# Patient Record
Sex: FEMALE | Race: WHITE | NOT HISPANIC OR LATINO
[De-identification: names, ages, dates, MRNs, and addresses within clinical notes are randomized per-mention and may not be internally consistent; named-entity substitution may affect disease eponyms.]

---

## 2017-01-20 ENCOUNTER — RX RENEWAL (OUTPATIENT)
Age: 67
End: 2017-01-20

## 2017-02-28 ENCOUNTER — CLINICAL ADVICE (OUTPATIENT)
Age: 67
End: 2017-02-28

## 2017-09-14 ENCOUNTER — RX RENEWAL (OUTPATIENT)
Age: 67
End: 2017-09-14

## 2018-06-27 ENCOUNTER — APPOINTMENT (OUTPATIENT)
Dept: INTERNAL MEDICINE | Facility: CLINIC | Age: 68
End: 2018-06-27
Payer: MEDICARE

## 2018-06-27 VITALS
SYSTOLIC BLOOD PRESSURE: 122 MMHG | OXYGEN SATURATION: 98 % | HEART RATE: 80 BPM | TEMPERATURE: 97.8 F | BODY MASS INDEX: 38.98 KG/M2 | DIASTOLIC BLOOD PRESSURE: 76 MMHG | HEIGHT: 63 IN | WEIGHT: 220 LBS

## 2018-06-27 PROCEDURE — 36415 COLL VENOUS BLD VENIPUNCTURE: CPT

## 2018-06-27 PROCEDURE — 93000 ELECTROCARDIOGRAM COMPLETE: CPT

## 2018-06-27 PROCEDURE — G0438: CPT | Mod: 25

## 2018-06-27 PROCEDURE — G0447 BEHAVIOR COUNSEL OBESITY 15M: CPT

## 2018-06-27 NOTE — HEALTH RISK ASSESSMENT
[Good] : ~his/her~  mood as  good [No falls in past year] : Patient reported no falls in the past year [Patient reported mammogram was normal] : Patient reported mammogram was normal [Patient reported PAP Smear was normal] : Patient reported PAP Smear was normal [Patient reported colonoscopy was normal] : Patient reported colonoscopy was normal [Hepatitis C test offered] : Hepatitis C test offered [Retired] : retired [Single] : single [Feels Safe at Home] : Feels safe at home [Fully functional (bathing, dressing, toileting, transferring, walking, feeding)] : Fully functional (bathing, dressing, toileting, transferring, walking, feeding) [Fully functional (using the telephone, shopping, preparing meals, housekeeping, doing laundry, using] : Fully functional and needs no help or supervision to perform IADLs (using the telephone, shopping, preparing meals, housekeeping, doing laundry, using transportation, managing medications and managing finances) [Smoke Detector] : smoke detector [Safety elements used in home] : safety elements used in home [Seat Belt] :  uses seat belt [Sunscreen] : uses sunscreen [Discussed at today's visit] : Advance Directives Discussed at today's visit [] : No [Change in mental status noted] : No change in mental status noted [Sexually Active] : not sexually active [Reports changes in hearing] : Reports no changes in hearing [Reports changes in vision] : Reports no changes in vision [Reports changes in dental health] : Reports no changes in dental health [TB Exposure] : is not being exposed to tuberculosis [MammogramDate] : 2014 [PapSmearDate] : 2013 [ColonoscopyDate] : 2008 [ColonoscopyComments] : refuses [de-identified] : mother [FreeTextEntry4] : [place] FOR[place] PROVIDED

## 2018-06-27 NOTE — HISTORY OF PRESENT ILLNESS
[FreeTextEntry1] : This is a 67-year-old female presents today to meet me as her primary care doctor and for initial examination

## 2018-06-27 NOTE — COUNSELING
[Weight management counseling provided] : Weight management [Healthy eating counseling provided] : healthy eating [Activity counseling provided] : activity [Good understanding] : Patient has a good understanding of lifestyle changes and the steps needed to achieve self management goals [ - Behavioral Counseling for Obesity (Face-to-Face for 15 Minutes)] : Behavioral Counseling for Obesity (Face-to-Face for 15 Minutes)

## 2018-06-27 NOTE — ASSESSMENT
[FreeTextEntry1] : Health maintenance.\par Her BMI is elevated in the class II obesity range and at least a 30 pound weight loss is recommended. We had an extended conversation regarding obesity-related morbidities including her current diagnoses of diabetes and hypertension.Patient understands that these conditions would be better controlled or possibly even resolve with recommended weight loss. Dietary and lifestyle approaches to achieve weight loss were reviewed. Referral to obesity management offered. Patient will consider.\par Daily age and ability appropriate aerobic exercises strongly recommended.\par No STD risk or substance abuse per patient report.\par Hepatitis C antibody sent with patient approval.\par GYN followup is recommended. Patient will consider.\par Mammogram ordered. Patient will schedule.\par Bone density testing ordered. Patient will schedule.\par She refuses colonoscopy at this time. May consider later.\par No depression. Competent with ADLs.\par Yearly flu vaccine is recommended.\par Pneumococcal and shingles vaccine series are recommended. Patient will consider.\par \par All medications refilled at patient request.

## 2018-06-28 LAB
25(OH)D3 SERPL-MCNC: 37.4 NG/ML
ALBUMIN SERPL ELPH-MCNC: 4.2 G/DL
ALP BLD-CCNC: 81 U/L
ALT SERPL-CCNC: 44 U/L
ANION GAP SERPL CALC-SCNC: 13 MMOL/L
AST SERPL-CCNC: 56 U/L
BASOPHILS # BLD AUTO: 0.04 K/UL
BASOPHILS NFR BLD AUTO: 0.4 %
BILIRUB SERPL-MCNC: 0.4 MG/DL
BUN SERPL-MCNC: 19 MG/DL
CALCIUM SERPL-MCNC: 10.9 MG/DL
CHLORIDE SERPL-SCNC: 99 MMOL/L
CHOLEST SERPL-MCNC: 117 MG/DL
CHOLEST/HDLC SERPL: 2.1 RATIO
CO2 SERPL-SCNC: 23 MMOL/L
CREAT SERPL-MCNC: 0.83 MG/DL
EOSINOPHIL # BLD AUTO: 0.39 K/UL
EOSINOPHIL NFR BLD AUTO: 3.6 %
GLUCOSE SERPL-MCNC: 222 MG/DL
HBA1C MFR BLD HPLC: 9.9 %
HCT VFR BLD CALC: 37.9 %
HDLC SERPL-MCNC: 55 MG/DL
HGB BLD-MCNC: 12.4 G/DL
IMM GRANULOCYTES NFR BLD AUTO: 0.7 %
LDLC SERPL CALC-MCNC: 34 MG/DL
LYMPHOCYTES # BLD AUTO: 2.31 K/UL
LYMPHOCYTES NFR BLD AUTO: 21.5 %
MAN DIFF?: NORMAL
MCHC RBC-ENTMCNC: 30 PG
MCHC RBC-ENTMCNC: 32.7 GM/DL
MCV RBC AUTO: 91.5 FL
MONOCYTES # BLD AUTO: 0.77 K/UL
MONOCYTES NFR BLD AUTO: 7.2 %
NEUTROPHILS # BLD AUTO: 7.14 K/UL
NEUTROPHILS NFR BLD AUTO: 66.6 %
PLATELET # BLD AUTO: 207 K/UL
POTASSIUM SERPL-SCNC: 4.5 MMOL/L
PROT SERPL-MCNC: 7.4 G/DL
RBC # BLD: 4.14 M/UL
RBC # FLD: 13.3 %
SODIUM SERPL-SCNC: 135 MMOL/L
TRIGL SERPL-MCNC: 139 MG/DL
TSH SERPL-ACNC: 3.36 UIU/ML
WBC # FLD AUTO: 10.73 K/UL

## 2018-07-05 ENCOUNTER — APPOINTMENT (OUTPATIENT)
Dept: MAMMOGRAPHY | Facility: CLINIC | Age: 68
End: 2018-07-05

## 2018-07-05 ENCOUNTER — APPOINTMENT (OUTPATIENT)
Dept: RADIOLOGY | Facility: CLINIC | Age: 68
End: 2018-07-05

## 2018-07-11 ENCOUNTER — NON-APPOINTMENT (OUTPATIENT)
Age: 68
End: 2018-07-11

## 2018-07-17 ENCOUNTER — FORM ENCOUNTER (OUTPATIENT)
Age: 68
End: 2018-07-17

## 2018-07-18 ENCOUNTER — APPOINTMENT (OUTPATIENT)
Dept: MAMMOGRAPHY | Facility: CLINIC | Age: 68
End: 2018-07-18
Payer: MEDICARE

## 2018-07-18 ENCOUNTER — OUTPATIENT (OUTPATIENT)
Dept: OUTPATIENT SERVICES | Facility: HOSPITAL | Age: 68
LOS: 1 days | End: 2018-07-18
Payer: COMMERCIAL

## 2018-07-18 ENCOUNTER — APPOINTMENT (OUTPATIENT)
Dept: RADIOLOGY | Facility: CLINIC | Age: 68
End: 2018-07-18
Payer: MEDICARE

## 2018-07-18 PROCEDURE — 77085 DXA BONE DENSITY AXL VRT FX: CPT | Mod: 26

## 2018-07-18 PROCEDURE — 77085 DXA BONE DENSITY AXL VRT FX: CPT

## 2018-07-18 PROCEDURE — 76642 ULTRASOUND BREAST LIMITED: CPT | Mod: 26,RT

## 2018-07-18 PROCEDURE — 76642 ULTRASOUND BREAST LIMITED: CPT

## 2018-07-18 PROCEDURE — 77067 SCR MAMMO BI INCL CAD: CPT | Mod: 26

## 2018-07-18 PROCEDURE — 77063 BREAST TOMOSYNTHESIS BI: CPT

## 2018-07-18 PROCEDURE — 77063 BREAST TOMOSYNTHESIS BI: CPT | Mod: 26

## 2018-07-18 PROCEDURE — 77067 SCR MAMMO BI INCL CAD: CPT

## 2018-07-24 ENCOUNTER — INBOUND DOCUMENT (OUTPATIENT)
Age: 68
End: 2018-07-24

## 2018-08-01 ENCOUNTER — APPOINTMENT (OUTPATIENT)
Dept: INTERNAL MEDICINE | Facility: CLINIC | Age: 68
End: 2018-08-01
Payer: MEDICARE

## 2018-08-01 PROCEDURE — 99214 OFFICE O/P EST MOD 30 MIN: CPT

## 2018-08-01 NOTE — ASSESSMENT
[FreeTextEntry1] : #1) HTN.\par Her blood pressure today is 122/76, down from 140/75 on her last visit.\par We reviewed her blood pressure regimen and the need for ongoing compliance.\par Will continue to follow on an occasional basis.\par \par #2) T2 DM.\par Review of recent A1c at 9.9.\par We had an extended conversation lasting at least 15 minutes regarding the association between uncontrolled dietary caloric intake, weight gain, and loss of control of blood sugars.\par I reminded the patient that over the past several years and has been necessary to increase her metformin from 500 mg up to 850 mg. I further discussed that she may require a second medication. I reviewed with her dietary and lifestyle approaches to achieve better glucose control and for weight loss.\par Will increase metformin dose to 1000 mg b.i.d. Patient promises to increase her dietary discretion. Will return in 3 months for followup blood work.\par \par #3)Parathyroid disease with elevated calcium.\par Her calcium level now is 10.9. This is improved from 11.3 about 18 months ago. Will continue to follow on an occasional basis. Her vitamin D level is good at about 38. Patient is encouraged to maintain compliance with supplemental vitamin D. Will followup her PTH level on next visit.\par Note that her bone density recently done is completely normal.\par \par #4) mammography.\par Patient reminded to followup her mammogram in 6 months as recommended by radiology. She is aware.

## 2018-08-01 NOTE — HISTORY OF PRESENT ILLNESS
[FreeTextEntry1] : Presents for blood pressure check and discussion regarding diabetes management. [de-identified] : No new complaints.\par States has been compliant with medications.

## 2018-10-05 ENCOUNTER — APPOINTMENT (OUTPATIENT)
Dept: INTERNAL MEDICINE | Facility: CLINIC | Age: 68
End: 2018-10-05
Payer: MEDICARE

## 2018-10-05 VITALS
HEIGHT: 63 IN | DIASTOLIC BLOOD PRESSURE: 84 MMHG | OXYGEN SATURATION: 96 % | SYSTOLIC BLOOD PRESSURE: 156 MMHG | WEIGHT: 218 LBS | BODY MASS INDEX: 38.62 KG/M2 | HEART RATE: 91 BPM | TEMPERATURE: 97.9 F

## 2018-10-05 PROCEDURE — 90472 IMMUNIZATION ADMIN EACH ADD: CPT

## 2018-10-05 PROCEDURE — G0008: CPT

## 2018-10-05 PROCEDURE — G0009: CPT

## 2018-10-05 PROCEDURE — 90662 IIV NO PRSV INCREASED AG IM: CPT

## 2018-10-05 PROCEDURE — 99214 OFFICE O/P EST MOD 30 MIN: CPT | Mod: 25

## 2018-10-05 PROCEDURE — 90670 PCV13 VACCINE IM: CPT

## 2018-10-15 ENCOUNTER — APPOINTMENT (OUTPATIENT)
Dept: INTERNAL MEDICINE | Facility: CLINIC | Age: 68
End: 2018-10-15
Payer: MEDICARE

## 2018-10-15 VITALS
DIASTOLIC BLOOD PRESSURE: 76 MMHG | OXYGEN SATURATION: 96 % | SYSTOLIC BLOOD PRESSURE: 160 MMHG | HEART RATE: 79 BPM | TEMPERATURE: 98 F | HEIGHT: 63 IN

## 2018-10-15 DIAGNOSIS — Z13.89 ENCOUNTER FOR SCREENING FOR OTHER DISORDER: ICD-10-CM

## 2018-10-15 PROCEDURE — 99214 OFFICE O/P EST MOD 30 MIN: CPT

## 2018-11-07 ENCOUNTER — APPOINTMENT (OUTPATIENT)
Dept: INTERNAL MEDICINE | Facility: CLINIC | Age: 68
End: 2018-11-07
Payer: MEDICARE

## 2018-11-07 VITALS
OXYGEN SATURATION: 98 % | HEART RATE: 80 BPM | TEMPERATURE: 98 F | HEIGHT: 63 IN | DIASTOLIC BLOOD PRESSURE: 72 MMHG | SYSTOLIC BLOOD PRESSURE: 149 MMHG

## 2018-11-07 PROCEDURE — 99213 OFFICE O/P EST LOW 20 MIN: CPT

## 2018-11-29 ENCOUNTER — APPOINTMENT (OUTPATIENT)
Dept: PHYSICAL MEDICINE AND REHAB | Facility: CLINIC | Age: 68
End: 2018-11-29
Payer: MEDICARE

## 2018-11-29 VITALS
HEART RATE: 73 BPM | RESPIRATION RATE: 16 BRPM | OXYGEN SATURATION: 97 % | WEIGHT: 218 LBS | HEIGHT: 63 IN | BODY MASS INDEX: 38.62 KG/M2

## 2018-11-29 DIAGNOSIS — Z86.59 PERSONAL HISTORY OF OTHER MENTAL AND BEHAVIORAL DISORDERS: ICD-10-CM

## 2018-11-29 DIAGNOSIS — Z87.09 PERSONAL HISTORY OF OTHER DISEASES OF THE RESPIRATORY SYSTEM: ICD-10-CM

## 2018-11-29 DIAGNOSIS — Z87.39 PERSONAL HISTORY OF OTHER DISEASES OF THE MUSCULOSKELETAL SYSTEM AND CONNECTIVE TISSUE: ICD-10-CM

## 2018-11-29 PROCEDURE — 99204 OFFICE O/P NEW MOD 45 MIN: CPT

## 2018-12-02 ENCOUNTER — FORM ENCOUNTER (OUTPATIENT)
Age: 68
End: 2018-12-02

## 2018-12-03 ENCOUNTER — APPOINTMENT (OUTPATIENT)
Dept: RADIOLOGY | Facility: CLINIC | Age: 68
End: 2018-12-03
Payer: MEDICARE

## 2018-12-03 ENCOUNTER — OUTPATIENT (OUTPATIENT)
Dept: OUTPATIENT SERVICES | Facility: HOSPITAL | Age: 68
LOS: 1 days | End: 2018-12-03

## 2018-12-03 PROCEDURE — 73502 X-RAY EXAM HIP UNI 2-3 VIEWS: CPT | Mod: 26,LT

## 2018-12-05 ENCOUNTER — INBOUND DOCUMENT (OUTPATIENT)
Age: 68
End: 2018-12-05

## 2018-12-13 ENCOUNTER — APPOINTMENT (OUTPATIENT)
Dept: PHYSICAL MEDICINE AND REHAB | Facility: CLINIC | Age: 68
End: 2018-12-13
Payer: MEDICARE

## 2018-12-13 VITALS
HEIGHT: 63 IN | BODY MASS INDEX: 38.62 KG/M2 | RESPIRATION RATE: 16 BRPM | HEART RATE: 74 BPM | OXYGEN SATURATION: 97 % | WEIGHT: 218 LBS

## 2018-12-13 PROCEDURE — 99214 OFFICE O/P EST MOD 30 MIN: CPT

## 2019-01-17 ENCOUNTER — APPOINTMENT (OUTPATIENT)
Dept: INTERNAL MEDICINE | Facility: CLINIC | Age: 69
End: 2019-01-17
Payer: MEDICARE

## 2019-01-17 VITALS
SYSTOLIC BLOOD PRESSURE: 140 MMHG | TEMPERATURE: 97.6 F | HEIGHT: 63 IN | OXYGEN SATURATION: 97 % | DIASTOLIC BLOOD PRESSURE: 83 MMHG | WEIGHT: 218 LBS | BODY MASS INDEX: 38.62 KG/M2 | HEART RATE: 79 BPM

## 2019-01-17 DIAGNOSIS — G57.02 LESION OF SCIATIC NERVE, LEFT LOWER LIMB: ICD-10-CM

## 2019-01-17 PROCEDURE — 36415 COLL VENOUS BLD VENIPUNCTURE: CPT

## 2019-01-17 PROCEDURE — 99214 OFFICE O/P EST MOD 30 MIN: CPT | Mod: 25

## 2019-01-18 LAB
ALBUMIN SERPL ELPH-MCNC: 4.2 G/DL
ALP BLD-CCNC: 82 U/L
ALT SERPL-CCNC: 21 U/L
ANION GAP SERPL CALC-SCNC: 13 MMOL/L
AST SERPL-CCNC: 20 U/L
BILIRUB SERPL-MCNC: 0.4 MG/DL
BUN SERPL-MCNC: 12 MG/DL
CALCIUM SERPL-MCNC: 10.5 MG/DL
CHLORIDE SERPL-SCNC: 100 MMOL/L
CO2 SERPL-SCNC: 24 MMOL/L
CREAT SERPL-MCNC: 0.78 MG/DL
GLUCOSE SERPL-MCNC: 294 MG/DL
HBA1C MFR BLD HPLC: 9.2 %
POTASSIUM SERPL-SCNC: 3.9 MMOL/L
PROT SERPL-MCNC: 7.2 G/DL
SODIUM SERPL-SCNC: 137 MMOL/L

## 2019-02-12 ENCOUNTER — RX RENEWAL (OUTPATIENT)
Age: 69
End: 2019-02-12

## 2019-04-01 ENCOUNTER — APPOINTMENT (OUTPATIENT)
Dept: INTERNAL MEDICINE | Facility: CLINIC | Age: 69
End: 2019-04-01
Payer: MEDICARE

## 2019-04-01 VITALS
DIASTOLIC BLOOD PRESSURE: 80 MMHG | WEIGHT: 217 LBS | HEIGHT: 63 IN | TEMPERATURE: 97.9 F | SYSTOLIC BLOOD PRESSURE: 144 MMHG | HEART RATE: 83 BPM | OXYGEN SATURATION: 97 % | BODY MASS INDEX: 38.45 KG/M2

## 2019-04-01 PROCEDURE — 36415 COLL VENOUS BLD VENIPUNCTURE: CPT

## 2019-04-01 PROCEDURE — 99214 OFFICE O/P EST MOD 30 MIN: CPT | Mod: 25

## 2019-04-02 LAB
ALBUMIN SERPL ELPH-MCNC: 4.1 G/DL
ALP BLD-CCNC: 74 U/L
ALT SERPL-CCNC: 20 U/L
ANION GAP SERPL CALC-SCNC: 15 MMOL/L
AST SERPL-CCNC: 24 U/L
BILIRUB SERPL-MCNC: 0.4 MG/DL
BUN SERPL-MCNC: 21 MG/DL
CALCIUM SERPL-MCNC: 10.5 MG/DL
CHLORIDE SERPL-SCNC: 102 MMOL/L
CO2 SERPL-SCNC: 20 MMOL/L
CREAT SERPL-MCNC: 0.67 MG/DL
ESTIMATED AVERAGE GLUCOSE: 189 MG/DL
GLUCOSE SERPL-MCNC: 170 MG/DL
HBA1C MFR BLD HPLC: 8.2 %
POTASSIUM SERPL-SCNC: 4.3 MMOL/L
PROT SERPL-MCNC: 6.6 G/DL
SODIUM SERPL-SCNC: 137 MMOL/L

## 2019-04-03 ENCOUNTER — APPOINTMENT (OUTPATIENT)
Dept: INTERNAL MEDICINE | Facility: CLINIC | Age: 69
End: 2019-04-03
Payer: MEDICARE

## 2019-04-03 ENCOUNTER — RX RENEWAL (OUTPATIENT)
Age: 69
End: 2019-04-03

## 2019-04-03 DIAGNOSIS — E11.42 TYPE 2 DIABETES MELLITUS WITH DIABETIC POLYNEUROPATHY: ICD-10-CM

## 2019-04-03 PROCEDURE — 99213 OFFICE O/P EST LOW 20 MIN: CPT

## 2019-04-10 ENCOUNTER — INBOUND DOCUMENT (OUTPATIENT)
Age: 69
End: 2019-04-10

## 2019-04-18 ENCOUNTER — APPOINTMENT (OUTPATIENT)
Dept: INTERNAL MEDICINE | Facility: CLINIC | Age: 69
End: 2019-04-18
Payer: MEDICARE

## 2019-05-02 ENCOUNTER — APPOINTMENT (OUTPATIENT)
Dept: INTERNAL MEDICINE | Facility: CLINIC | Age: 69
End: 2019-05-02
Payer: MEDICARE

## 2019-05-02 VITALS
SYSTOLIC BLOOD PRESSURE: 140 MMHG | DIASTOLIC BLOOD PRESSURE: 80 MMHG | HEIGHT: 63 IN | TEMPERATURE: 98.3 F | WEIGHT: 214 LBS | OXYGEN SATURATION: 97 % | HEART RATE: 99 BPM | BODY MASS INDEX: 37.92 KG/M2

## 2019-05-02 PROCEDURE — 99213 OFFICE O/P EST LOW 20 MIN: CPT

## 2019-06-13 ENCOUNTER — RX RENEWAL (OUTPATIENT)
Age: 69
End: 2019-06-13

## 2019-07-22 ENCOUNTER — APPOINTMENT (OUTPATIENT)
Dept: INTERNAL MEDICINE | Facility: CLINIC | Age: 69
End: 2019-07-22

## 2019-07-23 ENCOUNTER — APPOINTMENT (OUTPATIENT)
Dept: INTERNAL MEDICINE | Facility: CLINIC | Age: 69
End: 2019-07-23
Payer: MEDICARE

## 2019-07-23 VITALS — OXYGEN SATURATION: 98 % | DIASTOLIC BLOOD PRESSURE: 78 MMHG | HEART RATE: 82 BPM | SYSTOLIC BLOOD PRESSURE: 138 MMHG

## 2019-07-23 DIAGNOSIS — M25.551 PAIN IN RIGHT HIP: ICD-10-CM

## 2019-07-23 DIAGNOSIS — M25.552 PAIN IN RIGHT HIP: ICD-10-CM

## 2019-07-23 PROCEDURE — 99214 OFFICE O/P EST MOD 30 MIN: CPT

## 2019-07-23 RX ORDER — SITAGLIPTIN 50 MG/1
50 TABLET, FILM COATED ORAL
Qty: 60 | Refills: 5 | Status: DISCONTINUED | COMMUNITY
Start: 2019-01-25 | End: 2019-07-23

## 2019-08-02 ENCOUNTER — RX RENEWAL (OUTPATIENT)
Age: 69
End: 2019-08-02

## 2019-08-06 ENCOUNTER — APPOINTMENT (OUTPATIENT)
Dept: INTERNAL MEDICINE | Facility: CLINIC | Age: 69
End: 2019-08-06
Payer: MEDICARE

## 2019-08-16 ENCOUNTER — LABORATORY RESULT (OUTPATIENT)
Age: 69
End: 2019-08-16

## 2019-08-16 ENCOUNTER — APPOINTMENT (OUTPATIENT)
Dept: INTERNAL MEDICINE | Facility: CLINIC | Age: 69
End: 2019-08-16
Payer: MEDICARE

## 2019-08-16 ENCOUNTER — NON-APPOINTMENT (OUTPATIENT)
Age: 69
End: 2019-08-16

## 2019-08-16 VITALS
DIASTOLIC BLOOD PRESSURE: 64 MMHG | OXYGEN SATURATION: 97 % | HEART RATE: 82 BPM | SYSTOLIC BLOOD PRESSURE: 134 MMHG | TEMPERATURE: 98.9 F

## 2019-08-16 PROCEDURE — G0009: CPT

## 2019-08-16 PROCEDURE — 93000 ELECTROCARDIOGRAM COMPLETE: CPT

## 2019-08-16 PROCEDURE — G0447 BEHAVIOR COUNSEL OBESITY 15M: CPT

## 2019-08-16 PROCEDURE — G0439: CPT

## 2019-08-16 PROCEDURE — 90732 PPSV23 VACC 2 YRS+ SUBQ/IM: CPT

## 2019-08-16 PROCEDURE — 36415 COLL VENOUS BLD VENIPUNCTURE: CPT

## 2019-08-16 PROCEDURE — 99213 OFFICE O/P EST LOW 20 MIN: CPT | Mod: 25

## 2019-08-19 ENCOUNTER — RX RENEWAL (OUTPATIENT)
Age: 69
End: 2019-08-19

## 2019-08-19 LAB
25(OH)D3 SERPL-MCNC: 23.7 NG/ML
ALBUMIN SERPL ELPH-MCNC: 4.4 G/DL
ALP BLD-CCNC: 72 U/L
ALT SERPL-CCNC: 22 U/L
ANION GAP SERPL CALC-SCNC: 12 MMOL/L
APPEARANCE: ABNORMAL
AST SERPL-CCNC: 30 U/L
BASOPHILS # BLD AUTO: 0.07 K/UL
BASOPHILS NFR BLD AUTO: 0.8 %
BILIRUB SERPL-MCNC: 0.5 MG/DL
BILIRUBIN URINE: NEGATIVE
BLOOD URINE: NORMAL
BUN SERPL-MCNC: 16 MG/DL
CALCIUM SERPL-MCNC: 11 MG/DL
CHLORIDE SERPL-SCNC: 104 MMOL/L
CO2 SERPL-SCNC: 23 MMOL/L
COLOR: YELLOW
CREAT SERPL-MCNC: 0.62 MG/DL
EOSINOPHIL # BLD AUTO: 0.28 K/UL
EOSINOPHIL NFR BLD AUTO: 3 %
ESTIMATED AVERAGE GLUCOSE: 171 MG/DL
GLUCOSE QUALITATIVE U: ABNORMAL
GLUCOSE SERPL-MCNC: 167 MG/DL
HBA1C MFR BLD HPLC: 7.6 %
HCT VFR BLD CALC: 38.4 %
HCV AB SER QL: NONREACTIVE
HCV S/CO RATIO: 0.19 S/CO
HGB BLD-MCNC: 12.4 G/DL
IMM GRANULOCYTES NFR BLD AUTO: 0.3 %
KETONES URINE: NORMAL
LEUKOCYTE ESTERASE URINE: ABNORMAL
LYMPHOCYTES # BLD AUTO: 1.57 K/UL
LYMPHOCYTES NFR BLD AUTO: 16.9 %
MAN DIFF?: NORMAL
MCHC RBC-ENTMCNC: 29.9 PG
MCHC RBC-ENTMCNC: 32.3 GM/DL
MCV RBC AUTO: 92.5 FL
MONOCYTES # BLD AUTO: 0.74 K/UL
MONOCYTES NFR BLD AUTO: 8 %
NEUTROPHILS # BLD AUTO: 6.61 K/UL
NEUTROPHILS NFR BLD AUTO: 71 %
NITRITE URINE: NEGATIVE
PH URINE: 5.5
PLATELET # BLD AUTO: 183 K/UL
POTASSIUM SERPL-SCNC: 4.7 MMOL/L
PROT SERPL-MCNC: 6.7 G/DL
PROTEIN URINE: ABNORMAL
RBC # BLD: 4.15 M/UL
RBC # FLD: 13.2 %
SODIUM SERPL-SCNC: 139 MMOL/L
SPECIFIC GRAVITY URINE: 1.02
TSH SERPL-ACNC: 2.3 UIU/ML
UROBILINOGEN URINE: NORMAL
WBC # FLD AUTO: 9.3 K/UL

## 2019-08-22 ENCOUNTER — INBOUND DOCUMENT (OUTPATIENT)
Age: 69
End: 2019-08-22

## 2019-08-30 ENCOUNTER — APPOINTMENT (OUTPATIENT)
Dept: INTERNAL MEDICINE | Facility: CLINIC | Age: 69
End: 2019-08-30
Payer: MEDICARE

## 2019-08-30 VITALS
HEIGHT: 63 IN | BODY MASS INDEX: 36.32 KG/M2 | DIASTOLIC BLOOD PRESSURE: 67 MMHG | HEART RATE: 75 BPM | TEMPERATURE: 98.7 F | OXYGEN SATURATION: 96 % | SYSTOLIC BLOOD PRESSURE: 138 MMHG | WEIGHT: 205 LBS

## 2019-08-30 PROCEDURE — 99213 OFFICE O/P EST LOW 20 MIN: CPT

## 2019-10-21 ENCOUNTER — APPOINTMENT (OUTPATIENT)
Dept: INTERNAL MEDICINE | Facility: CLINIC | Age: 69
End: 2019-10-21

## 2019-10-24 ENCOUNTER — INBOUND DOCUMENT (OUTPATIENT)
Age: 69
End: 2019-10-24

## 2019-11-03 ENCOUNTER — LABORATORY RESULT (OUTPATIENT)
Age: 69
End: 2019-11-03

## 2019-11-04 ENCOUNTER — APPOINTMENT (OUTPATIENT)
Dept: INTERNAL MEDICINE | Facility: CLINIC | Age: 69
End: 2019-11-04
Payer: MEDICARE

## 2019-11-04 VITALS
WEIGHT: 202 LBS | SYSTOLIC BLOOD PRESSURE: 134 MMHG | HEART RATE: 81 BPM | OXYGEN SATURATION: 96 % | DIASTOLIC BLOOD PRESSURE: 80 MMHG | TEMPERATURE: 98.4 F | BODY MASS INDEX: 35.78 KG/M2

## 2019-11-04 PROCEDURE — 90662 IIV NO PRSV INCREASED AG IM: CPT

## 2019-11-04 PROCEDURE — 99214 OFFICE O/P EST MOD 30 MIN: CPT | Mod: 25

## 2019-11-04 PROCEDURE — G0008: CPT

## 2019-11-04 PROCEDURE — 36415 COLL VENOUS BLD VENIPUNCTURE: CPT

## 2019-11-05 LAB
ALBUMIN SERPL ELPH-MCNC: 4.5 G/DL
ANION GAP SERPL CALC-SCNC: 12 MMOL/L
APPEARANCE: ABNORMAL
BILIRUBIN URINE: NEGATIVE
BLOOD URINE: NORMAL
BUN SERPL-MCNC: 21 MG/DL
CALCIUM SERPL-MCNC: 10.8 MG/DL
CALCIUM SERPL-MCNC: 10.8 MG/DL
CHLORIDE SERPL-SCNC: 105 MMOL/L
CO2 SERPL-SCNC: 24 MMOL/L
COLOR: YELLOW
CREAT SERPL-MCNC: 0.73 MG/DL
ESTIMATED AVERAGE GLUCOSE: 171 MG/DL
GLUCOSE QUALITATIVE U: NEGATIVE
GLUCOSE SERPL-MCNC: 144 MG/DL
HBA1C MFR BLD HPLC: 7.6 %
KETONES URINE: NORMAL
LEUKOCYTE ESTERASE URINE: ABNORMAL
NITRITE URINE: POSITIVE
PARATHYROID HORMONE INTACT: 43 PG/ML
PH URINE: 5.5
PHOSPHATE SERPL-MCNC: 3.3 MG/DL
POTASSIUM SERPL-SCNC: 4.6 MMOL/L
PROTEIN URINE: ABNORMAL
SODIUM SERPL-SCNC: 141 MMOL/L
SPECIFIC GRAVITY URINE: 1.03
UROBILINOGEN URINE: NORMAL

## 2019-11-07 LAB — BACTERIA UR CULT: ABNORMAL

## 2020-03-09 ENCOUNTER — APPOINTMENT (OUTPATIENT)
Dept: INTERNAL MEDICINE | Facility: CLINIC | Age: 70
End: 2020-03-09

## 2020-05-08 ENCOUNTER — APPOINTMENT (OUTPATIENT)
Dept: INTERNAL MEDICINE | Facility: CLINIC | Age: 70
End: 2020-05-08
Payer: MEDICARE

## 2020-05-08 DIAGNOSIS — R51 HEADACHE: ICD-10-CM

## 2020-05-08 PROCEDURE — 99213 OFFICE O/P EST LOW 20 MIN: CPT | Mod: 95

## 2020-06-26 ENCOUNTER — RX RENEWAL (OUTPATIENT)
Age: 70
End: 2020-06-26

## 2020-06-29 ENCOUNTER — APPOINTMENT (OUTPATIENT)
Dept: INTERNAL MEDICINE | Facility: CLINIC | Age: 70
End: 2020-06-29

## 2020-06-29 ENCOUNTER — LABORATORY RESULT (OUTPATIENT)
Age: 70
End: 2020-06-29

## 2020-06-29 ENCOUNTER — APPOINTMENT (OUTPATIENT)
Dept: INTERNAL MEDICINE | Facility: CLINIC | Age: 70
End: 2020-06-29
Payer: MEDICARE

## 2020-06-29 VITALS
DIASTOLIC BLOOD PRESSURE: 77 MMHG | TEMPERATURE: 99.2 F | BODY MASS INDEX: 35.78 KG/M2 | SYSTOLIC BLOOD PRESSURE: 139 MMHG | WEIGHT: 202 LBS | OXYGEN SATURATION: 94 % | HEART RATE: 86 BPM

## 2020-06-29 DIAGNOSIS — M46.1 SACROILIITIS, NOT ELSEWHERE CLASSIFIED: ICD-10-CM

## 2020-06-29 PROCEDURE — 36415 COLL VENOUS BLD VENIPUNCTURE: CPT

## 2020-06-29 PROCEDURE — 99213 OFFICE O/P EST LOW 20 MIN: CPT | Mod: 25

## 2020-06-29 PROCEDURE — G0447 BEHAVIOR COUNSEL OBESITY 15M: CPT

## 2020-06-29 PROCEDURE — 99397 PER PM REEVAL EST PAT 65+ YR: CPT | Mod: 25

## 2020-07-01 LAB
25(OH)D3 SERPL-MCNC: 15.8 NG/ML
ALBUMIN SERPL ELPH-MCNC: 4.6 G/DL
ALP BLD-CCNC: 74 U/L
ALT SERPL-CCNC: 19 U/L
ANION GAP SERPL CALC-SCNC: 14 MMOL/L
APPEARANCE: CLEAR
AST SERPL-CCNC: 24 U/L
BACTERIA UR CULT: NORMAL
BILIRUB SERPL-MCNC: 0.3 MG/DL
BILIRUBIN URINE: NEGATIVE
BLOOD URINE: NEGATIVE
BUN SERPL-MCNC: 20 MG/DL
CALCIUM SERPL-MCNC: 10.6 MG/DL
CHLORIDE SERPL-SCNC: 105 MMOL/L
CO2 SERPL-SCNC: 19 MMOL/L
COLOR: YELLOW
CREAT SERPL-MCNC: 0.74 MG/DL
CREAT SPEC-SCNC: 164 MG/DL
ESTIMATED AVERAGE GLUCOSE: 169 MG/DL
GLUCOSE QUALITATIVE U: NEGATIVE
GLUCOSE SERPL-MCNC: 155 MG/DL
HBA1C MFR BLD HPLC: 7.5 %
KETONES URINE: NEGATIVE
LEUKOCYTE ESTERASE URINE: NEGATIVE
MICROALBUMIN 24H UR DL<=1MG/L-MCNC: 107.6 MG/DL
MICROALBUMIN/CREAT 24H UR-RTO: 656 MG/G
NITRITE URINE: NEGATIVE
PH URINE: 5.5
POTASSIUM SERPL-SCNC: 4.4 MMOL/L
PROT SERPL-MCNC: 6.9 G/DL
PROTEIN URINE: ABNORMAL
SARS-COV-2 IGG SERPL IA-ACNC: <0.1 INDEX
SARS-COV-2 IGG SERPL QL IA: NEGATIVE
SODIUM SERPL-SCNC: 139 MMOL/L
SPECIFIC GRAVITY URINE: 1.03
TSH SERPL-ACNC: 1.84 UIU/ML
UROBILINOGEN URINE: NORMAL

## 2020-10-05 ENCOUNTER — MED ADMIN CHARGE (OUTPATIENT)
Age: 70
End: 2020-10-05

## 2020-10-05 ENCOUNTER — APPOINTMENT (OUTPATIENT)
Dept: INTERNAL MEDICINE | Facility: CLINIC | Age: 70
End: 2020-10-05
Payer: MEDICARE

## 2020-10-05 VITALS
TEMPERATURE: 97.9 F | SYSTOLIC BLOOD PRESSURE: 130 MMHG | HEART RATE: 74 BPM | HEIGHT: 63 IN | DIASTOLIC BLOOD PRESSURE: 78 MMHG | BODY MASS INDEX: 35.08 KG/M2 | OXYGEN SATURATION: 97 % | WEIGHT: 198 LBS

## 2020-10-05 PROCEDURE — 36415 COLL VENOUS BLD VENIPUNCTURE: CPT

## 2020-10-05 PROCEDURE — G0008: CPT

## 2020-10-05 PROCEDURE — 90662 IIV NO PRSV INCREASED AG IM: CPT

## 2020-10-05 PROCEDURE — 99214 OFFICE O/P EST MOD 30 MIN: CPT | Mod: 25

## 2020-10-05 RX ORDER — ERGOCALCIFEROL 1.25 MG/1
1.25 MG CAPSULE, LIQUID FILLED ORAL
Qty: 12 | Refills: 0 | Status: ACTIVE | COMMUNITY
Start: 2020-10-05 | End: 1900-01-01

## 2020-10-06 LAB
25(OH)D3 SERPL-MCNC: 18.8 NG/ML
ANION GAP SERPL CALC-SCNC: 16 MMOL/L
BUN SERPL-MCNC: 14 MG/DL
CALCIUM SERPL-MCNC: 10.9 MG/DL
CHLORIDE SERPL-SCNC: 101 MMOL/L
CO2 SERPL-SCNC: 19 MMOL/L
CREAT SERPL-MCNC: 0.65 MG/DL
ESTIMATED AVERAGE GLUCOSE: 154 MG/DL
GLUCOSE SERPL-MCNC: 119 MG/DL
HBA1C MFR BLD HPLC: 7 %
POTASSIUM SERPL-SCNC: 4.6 MMOL/L
SODIUM SERPL-SCNC: 137 MMOL/L

## 2020-10-21 ENCOUNTER — APPOINTMENT (OUTPATIENT)
Dept: INTERNAL MEDICINE | Facility: CLINIC | Age: 70
End: 2020-10-21
Payer: MEDICARE

## 2020-10-21 ENCOUNTER — EMERGENCY (EMERGENCY)
Facility: HOSPITAL | Age: 70
LOS: 1 days | Discharge: HOME | End: 2020-10-21
Attending: EMERGENCY MEDICINE | Admitting: EMERGENCY MEDICINE
Payer: MEDICARE

## 2020-10-21 VITALS
OXYGEN SATURATION: 94 % | HEART RATE: 78 BPM | TEMPERATURE: 99 F | SYSTOLIC BLOOD PRESSURE: 198 MMHG | RESPIRATION RATE: 18 BRPM | DIASTOLIC BLOOD PRESSURE: 101 MMHG

## 2020-10-21 VITALS
RESPIRATION RATE: 18 BRPM | OXYGEN SATURATION: 96 % | HEART RATE: 70 BPM | SYSTOLIC BLOOD PRESSURE: 183 MMHG | DIASTOLIC BLOOD PRESSURE: 81 MMHG

## 2020-10-21 VITALS
HEART RATE: 85 BPM | TEMPERATURE: 98.1 F | SYSTOLIC BLOOD PRESSURE: 152 MMHG | DIASTOLIC BLOOD PRESSURE: 88 MMHG | OXYGEN SATURATION: 97 %

## 2020-10-21 LAB
ALBUMIN SERPL ELPH-MCNC: 3.6 G/DL — SIGNIFICANT CHANGE UP (ref 3.4–5)
ALP SERPL-CCNC: 69 U/L — SIGNIFICANT CHANGE UP (ref 40–120)
ALT FLD-CCNC: 24 U/L — SIGNIFICANT CHANGE UP (ref 12–42)
ANION GAP SERPL CALC-SCNC: 9 MMOL/L — SIGNIFICANT CHANGE UP (ref 9–16)
APTT BLD: 31.4 SEC — SIGNIFICANT CHANGE UP (ref 27.5–35.5)
AST SERPL-CCNC: 21 U/L — SIGNIFICANT CHANGE UP (ref 15–37)
BASOPHILS # BLD AUTO: 0.08 K/UL — SIGNIFICANT CHANGE UP (ref 0–0.2)
BASOPHILS NFR BLD AUTO: 0.7 % — SIGNIFICANT CHANGE UP (ref 0–2)
BILIRUB SERPL-MCNC: 0.4 MG/DL — SIGNIFICANT CHANGE UP (ref 0.2–1.2)
BUN SERPL-MCNC: 18 MG/DL — SIGNIFICANT CHANGE UP (ref 7–23)
CALCIUM SERPL-MCNC: 10.4 MG/DL — SIGNIFICANT CHANGE UP (ref 8.5–10.5)
CHLORIDE SERPL-SCNC: 106 MMOL/L — SIGNIFICANT CHANGE UP (ref 96–108)
CK SERPL-CCNC: 56 U/L — SIGNIFICANT CHANGE UP (ref 26–192)
CO2 SERPL-SCNC: 26 MMOL/L — SIGNIFICANT CHANGE UP (ref 22–31)
CREAT SERPL-MCNC: 0.66 MG/DL — SIGNIFICANT CHANGE UP (ref 0.5–1.3)
D DIMER BLD IA.RAPID-MCNC: <187 NG/ML DDU — SIGNIFICANT CHANGE UP
EOSINOPHIL # BLD AUTO: 0.4 K/UL — SIGNIFICANT CHANGE UP (ref 0–0.5)
EOSINOPHIL NFR BLD AUTO: 3.7 % — SIGNIFICANT CHANGE UP (ref 0–6)
GLUCOSE SERPL-MCNC: 137 MG/DL — HIGH (ref 70–99)
HCT VFR BLD CALC: 34.4 % — LOW (ref 34.5–45)
HGB BLD-MCNC: 11.4 G/DL — LOW (ref 11.5–15.5)
IMM GRANULOCYTES NFR BLD AUTO: 0.5 % — SIGNIFICANT CHANGE UP (ref 0–1.5)
INR BLD: 1.01 — SIGNIFICANT CHANGE UP (ref 0.88–1.16)
LYMPHOCYTES # BLD AUTO: 1.85 K/UL — SIGNIFICANT CHANGE UP (ref 1–3.3)
LYMPHOCYTES # BLD AUTO: 16.9 % — SIGNIFICANT CHANGE UP (ref 13–44)
MAGNESIUM SERPL-MCNC: 1.4 MG/DL — LOW (ref 1.6–2.6)
MCHC RBC-ENTMCNC: 29.9 PG — SIGNIFICANT CHANGE UP (ref 27–34)
MCHC RBC-ENTMCNC: 33.1 GM/DL — SIGNIFICANT CHANGE UP (ref 32–36)
MCV RBC AUTO: 90.3 FL — SIGNIFICANT CHANGE UP (ref 80–100)
MONOCYTES # BLD AUTO: 0.83 K/UL — SIGNIFICANT CHANGE UP (ref 0–0.9)
MONOCYTES NFR BLD AUTO: 7.6 % — SIGNIFICANT CHANGE UP (ref 2–14)
NEUTROPHILS # BLD AUTO: 7.7 K/UL — HIGH (ref 1.8–7.4)
NEUTROPHILS NFR BLD AUTO: 70.6 % — SIGNIFICANT CHANGE UP (ref 43–77)
NRBC # BLD: 0 /100 WBCS — SIGNIFICANT CHANGE UP (ref 0–0)
NT-PROBNP SERPL-SCNC: 265 PG/ML — SIGNIFICANT CHANGE UP
PCO2 BLDV: 41 MMHG — SIGNIFICANT CHANGE UP (ref 41–51)
PH BLDV: 7.39 — SIGNIFICANT CHANGE UP (ref 7.32–7.43)
PLATELET # BLD AUTO: 167 K/UL — SIGNIFICANT CHANGE UP (ref 150–400)
PO2 BLDV: 54 MMHG — HIGH (ref 35–40)
POTASSIUM SERPL-MCNC: 4.2 MMOL/L — SIGNIFICANT CHANGE UP (ref 3.5–5.3)
POTASSIUM SERPL-SCNC: 4.2 MMOL/L — SIGNIFICANT CHANGE UP (ref 3.5–5.3)
PROT SERPL-MCNC: 7.2 G/DL — SIGNIFICANT CHANGE UP (ref 6.4–8.2)
PROTHROM AB SERPL-ACNC: 11.9 SEC — SIGNIFICANT CHANGE UP (ref 10.6–13.6)
RBC # BLD: 3.81 M/UL — SIGNIFICANT CHANGE UP (ref 3.8–5.2)
RBC # FLD: 12.9 % — SIGNIFICANT CHANGE UP (ref 10.3–14.5)
SAO2 % BLDV: 89 % — SIGNIFICANT CHANGE UP
SARS-COV-2 RNA SPEC QL NAA+PROBE: SIGNIFICANT CHANGE UP
SODIUM SERPL-SCNC: 141 MMOL/L — SIGNIFICANT CHANGE UP (ref 132–145)
TROPONIN I SERPL-MCNC: <0.017 NG/ML — LOW (ref 0.02–0.06)
TSH SERPL-MCNC: 1.37 UIU/ML — SIGNIFICANT CHANGE UP (ref 0.36–3.74)
WBC # BLD: 10.92 K/UL — HIGH (ref 3.8–10.5)
WBC # FLD AUTO: 10.92 K/UL — HIGH (ref 3.8–10.5)

## 2020-10-21 PROCEDURE — 99285 EMERGENCY DEPT VISIT HI MDM: CPT

## 2020-10-21 PROCEDURE — 71045 X-RAY EXAM CHEST 1 VIEW: CPT | Mod: 26

## 2020-10-21 PROCEDURE — 93010 ELECTROCARDIOGRAM REPORT: CPT

## 2020-10-21 PROCEDURE — 99213 OFFICE O/P EST LOW 20 MIN: CPT

## 2020-10-21 RX ORDER — MAGNESIUM OXIDE 400 MG ORAL TABLET 241.3 MG
400 TABLET ORAL ONCE
Refills: 0 | Status: COMPLETED | OUTPATIENT
Start: 2020-10-21 | End: 2020-10-21

## 2020-10-21 RX ADMIN — MAGNESIUM OXIDE 400 MG ORAL TABLET 400 MILLIGRAM(S): 241.3 TABLET ORAL at 18:41

## 2020-10-21 NOTE — ED PROVIDER NOTE - NSFOLLOWUPINSTRUCTIONS_ED_ALL_ED_FT
High Troponin Levels    WHAT YOU NEED TO KNOW:    High troponin levels can be a sign of a heart attack or other heart damage. Troponins are muscle proteins, and some are only found in the heart muscle. Damage to the heart causes troponin to be released into the bloodstream. Blood tests check for troponin. Healthcare providers will talk to you about next steps based on your test results. You may need more tests. Your condition may need to be monitored if you are at high risk for a heart attack. Your doctor or other providers can also help you create a plan to improve or maintain your heart health.    DISCHARGE INSTRUCTIONS:    Call your local emergency number (911 in the ) for any of the following:   •You have any of the following signs of a heart attack: ?Squeezing, pressure, or pain in your chest      ?You may also have any of the following: ?Discomfort or pain in your back, neck, jaw, stomach, or arm      ?Shortness of breath      ?Nausea or vomiting      ?Lightheadedness or a sudden cold sweat            Return to the emergency department if:   •You have known angina and it is happening more often, lasting longer, or causing worse pain.      •You have shortness of breath at rest.      Call your doctor or cardiologist if:   •You have new or worse swelling in your feet or ankles.      •You have questions or concerns about your condition or care.      For support and more information:   •[employer]  [address] [place] [id number]   Phone: 1-699.629.1357  Web Address: http://www.heart.org         Follow up with your doctor or cardiologist as directed: Write down your questions so you remember to ask them during your visits.       © Copyright [employer] 2020           back to top                          © Copyright [employer] 2020

## 2020-10-21 NOTE — ED PROVIDER NOTE - CLINICAL SUMMARY MEDICAL DECISION MAKING FREE TEXT BOX
69 y/o F presenting with evaluation for unstable angina screening (not able to be performed at PCP office) 1 hour PTA. Spoke to Dr. Angel at 5PM who strongly believes cardiac origin. On exam, Pt appears well and in no apparent distress. Low risk factors for DVT. PCP wants screening CXR, EKG, and troponin. If all results are negative, will D/C home with follow up to PCP for further and definitive care. Will consider for admission if troponin is positive. 71 y/o F presenting with evaluation for unstable angina screening (which tests are unable to be performed at PCP office) 1 hour PTA. Spoke to Dr. Angel at 5PM who strongly believes it may be of cardiac origin. On exam, Pt appears well and in no apparent distress and denies any symptoms. Was able to walk here on her own from PCP office 1/4 mile away without SOB symptoms or hypoxia at triage. Speaking full sentence and unremarkable physical exam. Low risk factors for DVT. PCP wants screening CXR, EKG, and troponin. If all results are negative, will D/C home with follow up to PCP for further and definitive care. Will consider for admission if troponin is positive.

## 2020-10-21 NOTE — ED PROVIDER NOTE - OBJECTIVE STATEMENT
71 y/o F with PMHx of DM, HTN, asthma, and depression presents to the ED for nightly onset of orthopnea and L arm pain that radiates to the chest. Pt reports she was unsure if her symptoms were associated with asthma and decided to go to her PCP for evaluation (Dr. Ashish Angel [phone]). She states the chest tightness resolved on its own in the morning. She was referred to the ED for stat troponin and EKG screening as her PCP feels (as discussed over the phone at 5PM) this could be a symptom of unstable angina versus new onset CHF. Pt denies SOB, current CP, HA, dizziness, LOC, syncope, fever, any known contacts with any individuals tested positive for COVID-19, new medications, recent travels, new onset of LE swelling, recent travels, and Hx of MI, DVT, PE, CA, and exogenous hormone use. 69 y/o F with PMHx of DM, HTN, asthma, and depression presents to the ED for nightly onset of mild SOB and L arm tightness that radiates to the chest x 3 weeks, resolving on its own. Pt reports she was unsure if her symptoms were associated with her asthma and decided to go to her PCP for evaluation (Dr. Ashish Angel [phone]) today. From Dr. Angel's office she was then referred to the ED for stat troponin and EKG screening as her PCP feels (as discussed over the phone at 5PM) this could be a symptom of unstable angina vs. new onset CHF. Pt current asymptomatic and denies SOB, current CP, HA, dizziness, LOC, syncope, fever, any known contacts with any individuals tested positive for COVID-19, new medications, recent travels, new onset of LE swelling, recent travels, and Hx of MI, DVT, PE, CA, and exogenous hormone use.

## 2020-10-21 NOTE — ED PROVIDER NOTE - NOTES
Dr. Marlow, who knows patient and patient's mother very well, recommends single troponin test to r/o NSTEMI/angina. If negative workup and patient remains asymptomatic, Dr. Marlow recommends discharge home and outpatient follow up. Does not want patient to be admitted unnecessarily if patient is asymptomatic and has a negative workup due to unnecessary exposure to COVID-19 in a healthcare setting. Dr. Marlow does not feel this is a PE but is okay with a negative D-dimer to rule out need for CT imaging.

## 2020-10-21 NOTE — ED ADULT TRIAGE NOTE - CHIEF COMPLAINT QUOTE
Pt states she has SOB since last night and her PMD told her to come to the ER for a cardiac check and COVID test. Denies sick contact or travel

## 2020-10-21 NOTE — ED PROVIDER NOTE - MUSCULOSKELETAL, MLM
Spine appears normal, range of motion is not limited, no muscle or joint tenderness. Ambulates with walker unassisted.

## 2020-10-22 LAB — OSMOLALITY SERPL: 297 MOSMOL/KG — SIGNIFICANT CHANGE UP (ref 280–301)

## 2020-10-24 DIAGNOSIS — R06.01 ORTHOPNEA: ICD-10-CM

## 2020-10-24 DIAGNOSIS — R06.02 SHORTNESS OF BREATH: ICD-10-CM

## 2020-10-24 DIAGNOSIS — M79.602 PAIN IN LEFT ARM: ICD-10-CM

## 2020-10-24 DIAGNOSIS — Z20.828 CONTACT WITH AND (SUSPECTED) EXPOSURE TO OTHER VIRAL COMMUNICABLE DISEASES: ICD-10-CM

## 2020-10-28 PROBLEM — I10 ESSENTIAL (PRIMARY) HYPERTENSION: Chronic | Status: ACTIVE | Noted: 2020-10-21

## 2020-10-28 PROBLEM — J45.909 UNSPECIFIED ASTHMA, UNCOMPLICATED: Chronic | Status: ACTIVE | Noted: 2020-10-21

## 2020-10-28 PROBLEM — E11.9 TYPE 2 DIABETES MELLITUS WITHOUT COMPLICATIONS: Chronic | Status: ACTIVE | Noted: 2020-10-21

## 2020-11-03 ENCOUNTER — APPOINTMENT (OUTPATIENT)
Dept: INTERNAL MEDICINE | Facility: CLINIC | Age: 70
End: 2020-11-03
Payer: MEDICARE

## 2020-11-03 DIAGNOSIS — R07.89 OTHER CHEST PAIN: ICD-10-CM

## 2020-11-03 DIAGNOSIS — R06.00 DYSPNEA, UNSPECIFIED: ICD-10-CM

## 2020-11-03 PROCEDURE — 99443: CPT

## 2021-01-15 ENCOUNTER — RX RENEWAL (OUTPATIENT)
Age: 71
End: 2021-01-15

## 2021-01-24 ENCOUNTER — INPATIENT (INPATIENT)
Facility: HOSPITAL | Age: 71
LOS: 4 days | Discharge: NURSING, REHAB OR LONG-TERM CARE | DRG: 871 | End: 2021-01-29
Attending: STUDENT IN AN ORGANIZED HEALTH CARE EDUCATION/TRAINING PROGRAM | Admitting: STUDENT IN AN ORGANIZED HEALTH CARE EDUCATION/TRAINING PROGRAM
Payer: COMMERCIAL

## 2021-01-24 VITALS
TEMPERATURE: 98 F | WEIGHT: 200.62 LBS | DIASTOLIC BLOOD PRESSURE: 61 MMHG | SYSTOLIC BLOOD PRESSURE: 92 MMHG | RESPIRATION RATE: 16 BRPM | HEART RATE: 95 BPM | OXYGEN SATURATION: 94 %

## 2021-01-24 DIAGNOSIS — J45.909 UNSPECIFIED ASTHMA, UNCOMPLICATED: ICD-10-CM

## 2021-01-24 DIAGNOSIS — F31.9 BIPOLAR DISORDER, UNSPECIFIED: ICD-10-CM

## 2021-01-24 DIAGNOSIS — E78.5 HYPERLIPIDEMIA, UNSPECIFIED: ICD-10-CM

## 2021-01-24 DIAGNOSIS — R60.0 LOCALIZED EDEMA: ICD-10-CM

## 2021-01-24 DIAGNOSIS — M62.82 RHABDOMYOLYSIS: ICD-10-CM

## 2021-01-24 DIAGNOSIS — A41.9 SEPSIS, UNSPECIFIED ORGANISM: ICD-10-CM

## 2021-01-24 DIAGNOSIS — E63.9 NUTRITIONAL DEFICIENCY, UNSPECIFIED: ICD-10-CM

## 2021-01-24 DIAGNOSIS — N39.0 URINARY TRACT INFECTION, SITE NOT SPECIFIED: ICD-10-CM

## 2021-01-24 DIAGNOSIS — E11.9 TYPE 2 DIABETES MELLITUS WITHOUT COMPLICATIONS: ICD-10-CM

## 2021-01-24 DIAGNOSIS — I10 ESSENTIAL (PRIMARY) HYPERTENSION: ICD-10-CM

## 2021-01-24 LAB
ALBUMIN SERPL ELPH-MCNC: 2.8 G/DL — LOW (ref 3.4–5)
ALP SERPL-CCNC: 66 U/L — SIGNIFICANT CHANGE UP (ref 40–120)
ALT FLD-CCNC: 228 U/L — HIGH (ref 12–42)
AMPHET UR-MCNC: NEGATIVE — SIGNIFICANT CHANGE UP
ANION GAP SERPL CALC-SCNC: 16 MMOL/L — SIGNIFICANT CHANGE UP (ref 9–16)
APPEARANCE UR: CLEAR — SIGNIFICANT CHANGE UP
AST SERPL-CCNC: 685 U/L — HIGH (ref 15–37)
BACTERIA # UR AUTO: PRESENT /HPF
BARBITURATES UR SCN-MCNC: NEGATIVE — SIGNIFICANT CHANGE UP
BASOPHILS # BLD AUTO: 0.07 K/UL — SIGNIFICANT CHANGE UP (ref 0–0.2)
BASOPHILS NFR BLD AUTO: 0.3 % — SIGNIFICANT CHANGE UP (ref 0–2)
BENZODIAZ UR-MCNC: NEGATIVE — SIGNIFICANT CHANGE UP
BILIRUB SERPL-MCNC: 0.8 MG/DL — SIGNIFICANT CHANGE UP (ref 0.2–1.2)
BILIRUB UR-MCNC: ABNORMAL
BUN SERPL-MCNC: 39 MG/DL — HIGH (ref 7–23)
CALCIUM SERPL-MCNC: 10.2 MG/DL — SIGNIFICANT CHANGE UP (ref 8.5–10.5)
CHLORIDE SERPL-SCNC: 96 MMOL/L — SIGNIFICANT CHANGE UP (ref 96–108)
CK SERPL-CCNC: CRITICAL HIGH U/L (ref 25–170)
CK SERPL-CCNC: CRITICAL HIGH U/L (ref 26–192)
CO2 SERPL-SCNC: 20 MMOL/L — LOW (ref 22–31)
COCAINE METAB.OTHER UR-MCNC: NEGATIVE — SIGNIFICANT CHANGE UP
COLOR SPEC: YELLOW — SIGNIFICANT CHANGE UP
COMMENT - URINE: SIGNIFICANT CHANGE UP
CREAT SERPL-MCNC: 3.19 MG/DL — HIGH (ref 0.5–1.3)
DIFF PNL FLD: ABNORMAL
EOSINOPHIL # BLD AUTO: 0.15 K/UL — SIGNIFICANT CHANGE UP (ref 0–0.5)
EOSINOPHIL NFR BLD AUTO: 0.7 % — SIGNIFICANT CHANGE UP (ref 0–6)
EPI CELLS # UR: SIGNIFICANT CHANGE UP /HPF (ref 0–5)
FLUAV H1 2009 PAND RNA SPEC QL NAA+PROBE: SIGNIFICANT CHANGE UP
FLUAV H1 RNA SPEC QL NAA+PROBE: SIGNIFICANT CHANGE UP
FLUAV H3 RNA SPEC QL NAA+PROBE: SIGNIFICANT CHANGE UP
FLUAV SUBTYP SPEC NAA+PROBE: SIGNIFICANT CHANGE UP
FLUBV RNA SPEC QL NAA+PROBE: SIGNIFICANT CHANGE UP
GLUCOSE BLDC GLUCOMTR-MCNC: 276 MG/DL — HIGH (ref 70–99)
GLUCOSE SERPL-MCNC: 305 MG/DL — HIGH (ref 70–99)
GLUCOSE UR QL: NEGATIVE — SIGNIFICANT CHANGE UP
GRAN CASTS # UR COMP ASSIST: ABNORMAL /LPF
HCT VFR BLD CALC: 40.9 % — SIGNIFICANT CHANGE UP (ref 34.5–45)
HGB BLD-MCNC: 13.7 G/DL — SIGNIFICANT CHANGE UP (ref 11.5–15.5)
HYALINE CASTS # UR AUTO: ABNORMAL /LPF (ref 0–2)
IMM GRANULOCYTES NFR BLD AUTO: 1.2 % — SIGNIFICANT CHANGE UP (ref 0–1.5)
KETONES UR-MCNC: ABNORMAL MG/DL
LACTATE SERPL-SCNC: 1.5 MMOL/L — SIGNIFICANT CHANGE UP (ref 0.5–2)
LACTATE SERPL-SCNC: 3.1 MMOL/L — HIGH (ref 0.4–2)
LACTATE SERPL-SCNC: 3.5 MMOL/L — HIGH (ref 0.4–2)
LEUKOCYTE ESTERASE UR-ACNC: ABNORMAL
LIDOCAIN IGE QN: 188 U/L — SIGNIFICANT CHANGE UP (ref 73–393)
LYMPHOCYTES # BLD AUTO: 0.69 K/UL — LOW (ref 1–3.3)
LYMPHOCYTES # BLD AUTO: 3.1 % — LOW (ref 13–44)
MAGNESIUM SERPL-MCNC: 1.7 MG/DL — SIGNIFICANT CHANGE UP (ref 1.6–2.6)
MCHC RBC-ENTMCNC: 29.5 PG — SIGNIFICANT CHANGE UP (ref 27–34)
MCHC RBC-ENTMCNC: 33.5 GM/DL — SIGNIFICANT CHANGE UP (ref 32–36)
MCV RBC AUTO: 88.1 FL — SIGNIFICANT CHANGE UP (ref 80–100)
METHADONE UR-MCNC: NEGATIVE — SIGNIFICANT CHANGE UP
MONOCYTES # BLD AUTO: 2.37 K/UL — HIGH (ref 0–0.9)
MONOCYTES NFR BLD AUTO: 10.7 % — SIGNIFICANT CHANGE UP (ref 2–14)
NEUTROPHILS # BLD AUTO: 18.62 K/UL — HIGH (ref 1.8–7.4)
NEUTROPHILS NFR BLD AUTO: 84 % — HIGH (ref 43–77)
NITRITE UR-MCNC: POSITIVE
NRBC # BLD: 0 /100 WBCS — SIGNIFICANT CHANGE UP (ref 0–0)
OPIATES UR-MCNC: NEGATIVE — SIGNIFICANT CHANGE UP
PCP SPEC-MCNC: SIGNIFICANT CHANGE UP
PCP UR-MCNC: NEGATIVE — SIGNIFICANT CHANGE UP
PH UR: 6 — SIGNIFICANT CHANGE UP (ref 5–8)
PLATELET # BLD AUTO: 197 K/UL — SIGNIFICANT CHANGE UP (ref 150–400)
POTASSIUM SERPL-MCNC: 4.6 MMOL/L — SIGNIFICANT CHANGE UP (ref 3.5–5.3)
POTASSIUM SERPL-SCNC: 4.6 MMOL/L — SIGNIFICANT CHANGE UP (ref 3.5–5.3)
PROT SERPL-MCNC: 7.5 G/DL — SIGNIFICANT CHANGE UP (ref 6.4–8.2)
PROT UR-MCNC: >=300 MG/DL
RAPID RVP RESULT: SIGNIFICANT CHANGE UP
RBC # BLD: 4.64 M/UL — SIGNIFICANT CHANGE UP (ref 3.8–5.2)
RBC # FLD: 12.9 % — SIGNIFICANT CHANGE UP (ref 10.3–14.5)
RBC CASTS # UR COMP ASSIST: ABNORMAL /HPF
SARS-COV-2 RNA SPEC QL NAA+PROBE: SIGNIFICANT CHANGE UP
SODIUM SERPL-SCNC: 132 MMOL/L — SIGNIFICANT CHANGE UP (ref 132–145)
SP GR SPEC: 1.02 — SIGNIFICANT CHANGE UP (ref 1–1.03)
THC UR QL: NEGATIVE — SIGNIFICANT CHANGE UP
UROBILINOGEN FLD QL: 0.2 E.U./DL — SIGNIFICANT CHANGE UP
WBC # BLD: 22.16 K/UL — HIGH (ref 3.8–10.5)
WBC # FLD AUTO: 22.16 K/UL — HIGH (ref 3.8–10.5)
WBC UR QL: ABNORMAL /HPF

## 2021-01-24 PROCEDURE — 72125 CT NECK SPINE W/O DYE: CPT | Mod: 26

## 2021-01-24 PROCEDURE — 73562 X-RAY EXAM OF KNEE 3: CPT | Mod: 26,LT

## 2021-01-24 PROCEDURE — 73502 X-RAY EXAM HIP UNI 2-3 VIEWS: CPT | Mod: 26,LT

## 2021-01-24 PROCEDURE — 99223 1ST HOSP IP/OBS HIGH 75: CPT | Mod: GC

## 2021-01-24 PROCEDURE — 70450 CT HEAD/BRAIN W/O DYE: CPT | Mod: 26

## 2021-01-24 PROCEDURE — 71045 X-RAY EXAM CHEST 1 VIEW: CPT | Mod: 26

## 2021-01-24 PROCEDURE — 93010 ELECTROCARDIOGRAM REPORT: CPT

## 2021-01-24 PROCEDURE — 99285 EMERGENCY DEPT VISIT HI MDM: CPT

## 2021-01-24 PROCEDURE — 76705 ECHO EXAM OF ABDOMEN: CPT | Mod: 26

## 2021-01-24 RX ORDER — CEFTRIAXONE 500 MG/1
1000 INJECTION, POWDER, FOR SOLUTION INTRAMUSCULAR; INTRAVENOUS EVERY 24 HOURS
Refills: 0 | Status: DISCONTINUED | OUTPATIENT
Start: 2021-01-25 | End: 2021-01-25

## 2021-01-24 RX ORDER — DEXTROSE 50 % IN WATER 50 %
25 SYRINGE (ML) INTRAVENOUS ONCE
Refills: 0 | Status: DISCONTINUED | OUTPATIENT
Start: 2021-01-24 | End: 2021-01-29

## 2021-01-24 RX ORDER — INSULIN LISPRO 100/ML
VIAL (ML) SUBCUTANEOUS
Refills: 0 | Status: DISCONTINUED | OUTPATIENT
Start: 2021-01-24 | End: 2021-01-29

## 2021-01-24 RX ORDER — SODIUM CHLORIDE 9 MG/ML
1000 INJECTION INTRAMUSCULAR; INTRAVENOUS; SUBCUTANEOUS
Refills: 0 | Status: DISCONTINUED | OUTPATIENT
Start: 2021-01-24 | End: 2021-01-25

## 2021-01-24 RX ORDER — SODIUM CHLORIDE 9 MG/ML
1000 INJECTION, SOLUTION INTRAVENOUS
Refills: 0 | Status: DISCONTINUED | OUTPATIENT
Start: 2021-01-24 | End: 2021-01-29

## 2021-01-24 RX ORDER — ARIPIPRAZOLE 15 MG/1
20 TABLET ORAL DAILY
Refills: 0 | Status: DISCONTINUED | OUTPATIENT
Start: 2021-01-24 | End: 2021-01-29

## 2021-01-24 RX ORDER — ACETAMINOPHEN 500 MG
650 TABLET ORAL ONCE
Refills: 0 | Status: COMPLETED | OUTPATIENT
Start: 2021-01-24 | End: 2021-01-24

## 2021-01-24 RX ORDER — SODIUM CHLORIDE 9 MG/ML
1000 INJECTION INTRAMUSCULAR; INTRAVENOUS; SUBCUTANEOUS ONCE
Refills: 0 | Status: COMPLETED | OUTPATIENT
Start: 2021-01-24 | End: 2021-01-24

## 2021-01-24 RX ORDER — IPRATROPIUM/ALBUTEROL SULFATE 18-103MCG
3 AEROSOL WITH ADAPTER (GRAM) INHALATION EVERY 4 HOURS
Refills: 0 | Status: DISCONTINUED | OUTPATIENT
Start: 2021-01-24 | End: 2021-01-29

## 2021-01-24 RX ORDER — DEXTROSE 50 % IN WATER 50 %
15 SYRINGE (ML) INTRAVENOUS ONCE
Refills: 0 | Status: DISCONTINUED | OUTPATIENT
Start: 2021-01-24 | End: 2021-01-29

## 2021-01-24 RX ORDER — TETANUS TOXOID, REDUCED DIPHTHERIA TOXOID AND ACELLULAR PERTUSSIS VACCINE, ADSORBED 5; 2.5; 8; 8; 2.5 [IU]/.5ML; [IU]/.5ML; UG/.5ML; UG/.5ML; UG/.5ML
0.5 SUSPENSION INTRAMUSCULAR ONCE
Refills: 0 | Status: COMPLETED | OUTPATIENT
Start: 2021-01-24 | End: 2021-01-24

## 2021-01-24 RX ORDER — GLUCAGON INJECTION, SOLUTION 0.5 MG/.1ML
1 INJECTION, SOLUTION SUBCUTANEOUS ONCE
Refills: 0 | Status: DISCONTINUED | OUTPATIENT
Start: 2021-01-24 | End: 2021-01-29

## 2021-01-24 RX ORDER — HEPARIN SODIUM 5000 [USP'U]/ML
7500 INJECTION INTRAVENOUS; SUBCUTANEOUS EVERY 8 HOURS
Refills: 0 | Status: DISCONTINUED | OUTPATIENT
Start: 2021-01-24 | End: 2021-01-29

## 2021-01-24 RX ORDER — CEFTRIAXONE 500 MG/1
1000 INJECTION, POWDER, FOR SOLUTION INTRAMUSCULAR; INTRAVENOUS ONCE
Refills: 0 | Status: COMPLETED | OUTPATIENT
Start: 2021-01-24 | End: 2021-01-24

## 2021-01-24 RX ADMIN — SODIUM CHLORIDE 2000 MILLILITER(S): 9 INJECTION INTRAMUSCULAR; INTRAVENOUS; SUBCUTANEOUS at 17:02

## 2021-01-24 RX ADMIN — SODIUM CHLORIDE 1000 MILLILITER(S): 9 INJECTION INTRAMUSCULAR; INTRAVENOUS; SUBCUTANEOUS at 14:15

## 2021-01-24 RX ADMIN — Medication 650 MILLIGRAM(S): at 17:10

## 2021-01-24 RX ADMIN — SODIUM CHLORIDE 200 MILLILITER(S): 9 INJECTION INTRAMUSCULAR; INTRAVENOUS; SUBCUTANEOUS at 18:37

## 2021-01-24 RX ADMIN — TETANUS TOXOID, REDUCED DIPHTHERIA TOXOID AND ACELLULAR PERTUSSIS VACCINE, ADSORBED 0.5 MILLILITER(S): 5; 2.5; 8; 8; 2.5 SUSPENSION INTRAMUSCULAR at 14:14

## 2021-01-24 RX ADMIN — CEFTRIAXONE 100 MILLIGRAM(S): 500 INJECTION, POWDER, FOR SOLUTION INTRAMUSCULAR; INTRAVENOUS at 15:00

## 2021-01-24 NOTE — ED PROVIDER NOTE - PMH
Arthritis of left hip    Asthma    Bipolar disorder    Diabetes mellitus    HLD (hyperlipidemia)    HTN (hypertension)

## 2021-01-24 NOTE — H&P ADULT - PROBLEM SELECTOR PLAN 1
Noted urinary frequency with +UA  leukocytosis and SBP 92 on admission with T102  in ED qSOFA 1 (BP) and elevated lactate - meeting sepsis criteria.  - c/w CTX 1gQD for UTI  - f/u Blood and urine cultures  - Monitor U out

## 2021-01-24 NOTE — ED PROVIDER NOTE - OBJECTIVE STATEMENT
71 y/o F with PMHx of HTN, DM, HLD, L hip arthritis, and bipolar disorder (currently on Metformin, Furosamide, Abilify, and daily Statin) presents to the ED via EMS for evaluation. Pt reports she has been feeling unwell for the past 4 days. She recently had a fall from her bed yesterday and landed onto her L knee. Pt spent about 8 hours on the floor. She was eventually able to lay down onto a hammock. Today, she was having difficulty getting out of the hammock. She also notes L arm weakness and generalized weakness. Pt had prodrome 2 days ago where she was laying in bed and feeling unwell, weak, and with urinary symptoms. Pt thought she may have had a UTI. She reports having difficulty reaching out to her PCP at the time. Today, Pt also endorses dizziness / lightheadedness and fatigue. Pt also noted abrasions to the L knee and L elbow. EMS was called who brought Pt to the ED for evaluation. She denies Abd pain, CP, SOB, and coughs.

## 2021-01-24 NOTE — H&P ADULT - NSHPSOCIALHISTORY_GEN_ALL_CORE
Spoke to patient-was very pleased with today's visit and scheduled next appt to get orthotics fitted -was very happy that insurance will cover orthotics
30 pack year smoking history, No alcohol use  No illicit drug use  Uses walker and lives alone

## 2021-01-24 NOTE — ED ADULT NURSE NOTE - NSIMPLEMENTINTERV_GEN_ALL_ED
Implemented All Fall with Harm Risk Interventions:  [place] to call system. Call bell, personal items and telephone within reach. Instruct patient to call for assistance. Room bathroom lighting operational. Non-slip footwear when patient is off stretcher. Physically safe environment: no spills, clutter or unnecessary equipment. Stretcher in lowest position, wheels locked, appropriate side rails in place. Provide visual cue, wrist band, yellow gown, etc. Monitor gait and stability. Monitor for mental status changes and reorient to person, place, and time. Review medications for side effects contributing to fall risk. Reinforce activity limits and safety measures with patient and family. Provide visual clues: red socks.

## 2021-01-24 NOTE — ED PROVIDER NOTE - CARE PLAN
Principal Discharge DX:	UTI (urinary tract infection)  Secondary Diagnosis:	Sepsis  Secondary Diagnosis:	Lactic acidosis  Secondary Diagnosis:	Rhabdomyolysis
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
## 2021-01-24 NOTE — H&P ADULT - PROBLEM SELECTOR PLAN 10
F: none  E: Replete PRN K<4, Mg<2  N: DASH/TLC Diabetes    DVT  GI    Dispo: [facility]  CODE: Trial of intubation and CPR

## 2021-01-24 NOTE — ED ADULT NURSE NOTE - OBJECTIVE STATEMENT
pt here for multiple falls this weeks; c/o weakness; fell onto left side of body; c/o left arm pain; shaking on right arm; also c/o dysuria and urinary frequency
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
## 2021-01-24 NOTE — ED PROVIDER NOTE - PROGRESS NOTE DETAILS
Lactic acidosis noted on blood work; Acute renal insufficiency and elevated CK; Concern for rhabdomyolysis; Increased fluids and will trend lactic acid. Also added Ceftriaxone as broad spectrum   since UTI is likely; UA sample not collected yet. Otherwise, imaging is non-concerning for traumatic injuries. Spoke to pt's neighbor Ms Analisa Polk to update about results and disposition, [phone]. Pt with lactic acidosis and UTI, and rhabdo with FACUNDO, generalized weakness. Transaminitis lilely has to do with rhabdo as well Pt with lactic acidosis and UTI, and rhabdo with FACUNDO, generalized weakness. Transaminitis likely has to do with rhabdo as well Presented case for admission to Dr Duhram, accepts admission under IM.
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
## 2021-01-24 NOTE — ED PROVIDER NOTE - MUSCULOSKELETAL, MLM
LLE: FROM of the knee although tender anteriorly. No hip tenderness; LUE: FROM of the elbow from. No tenderness or deformities.

## 2021-01-24 NOTE — ED PROVIDER NOTE - CLINICAL SUMMARY MEDICAL DECISION MAKING FREE TEXT BOX
71 y/o F presents to the ED via EMS for evaluation s/p fall with associated dizziness / lightheadedness, generalized weakness, and fatigue. Exam is remarkable for 4/5 strength to the LUE. L knee with some tenderness anteriorly. Concern for fall with prolonged time on the floor. Will r/o infectious / metabolic etiologies. Also concerned for rhabdomyolysis and brain bleed given decreased strength to the LUE. Will order basic labs, EKG, and trauma / stroke imaging. Will reassess clinically after results have been obtained. Will consider for admission to [facility] for further workup.

## 2021-01-24 NOTE — ED ADULT TRIAGE NOTE - CHIEF COMPLAINT QUOTE
Patient fell x 2 within the last 48 hrs,  the last fall pt was unable to get up, was on ground on her own. C/o left sided weakness.

## 2021-01-24 NOTE — H&P ADULT - PROBLEM SELECTOR PLAN 3
CK 18K with transaminitis and FACUNDO after being down for 8 hours + at home s/p mechanical fall. s/p 2L NS in ED and started on 200 cc/.hr. EKG NSR.  - c/w IVF NS 200cc/hr   - Monitor Urine out put closely  - Monitor EKG  - Trend CMP (LFTs Cr and CPK) q6hrs    #Transaminitis  US showed hepatomegaly and cholelithiasis liklely in setting of hepatosteatosis  - Trend LFTs  - Holding statin and ACE

## 2021-01-24 NOTE — H&P ADULT - NSICDXPASTMEDICALHX_GEN_ALL_CORE_FT
PAST MEDICAL HISTORY:  Arthritis of left hip     Asthma     Bipolar disorder     Diabetes mellitus     HLD (hyperlipidemia)     HTN (hypertension)

## 2021-01-24 NOTE — H&P ADULT - PROBLEM SELECTOR PLAN 7
On Onglyza at home and Metformin.  - f/u HbA1c  - mISS  - calculate need for Lantus dosing as per requirements

## 2021-01-24 NOTE — H&P ADULT - NSHPPHYSICALEXAM_GEN_ALL_CORE
PHYSICAL EXAM:  GENERAL: NAD, speaks in full sentences, no signs of respiratory distress  HEAD:  Atraumatic, Normocephalic  EYES: EOMI, PERRLA, conjunctiva and sclera clear  NECK: Supple, No JVD  CHEST/LUNG: Clear to auscultation bilaterally; No wheeze; No crackles; No accessory muscles used  HEART: Regular rate and rhythm; No murmurs;   ABDOMEN: Soft, Nontender, Nondistended; Bowel sounds present; No guarding  EXTREMITIES:  2+ Peripheral Pulses, No cyanosis or edema  PSYCH: AAOx3  NEUROLOGY: non-focal  SKIN: No rashes or lesions GENERAL: Elderly  female in NAD, moderated respiratory distress. completes sentences  HEAD:  Atraumatic, Normocephalic  EYES: EOMI, PERRLA, conjunctiva and sclera clear  NECK: Supple, No JVD  CHEST/LUNG: Good air entry bilaterally with expiratory wheeze  HEART: Regular rate and rhythm; No murmurs;   ABDOMEN: Soft, Nontender, Nondistended; Bowel sounds present; No guarding  EXTREMITIES:  2+ Peripheral Pulses, No cyanosis or edema  PSYCH: AAOx3  NEUROLOGY: non-focal  SKIN: No rashes or lesions

## 2021-01-24 NOTE — H&P ADULT - PROBLEM SELECTOR PLAN 9
F: none  E: Replete PRN K<4, Mg<2  N: DASH/TLC Diabetes    DVT  GI    Dispo: [facility]  CODE: Trial of intubation and CPR Reports starting Lasix for LE edema. No Dx of CHF.  - Holding in setting of Rhabdo and poor volume statis Reports starting Lasix for LE edema. No Dx of CHF.  - Holding in setting of Rhabdo and poor volume statis  - Echocardiogram  - Consder Cardiology consult based on Echo results

## 2021-01-24 NOTE — H&P ADULT - NSHPLABSRESULTS_GEN_ALL_CORE
LABS:                        13.7   22.16 )-----------( 197      ( 24 Jan 2021 14:08 )             40.9     24 Jan 2021 14:08    132    |  96     |  39     ----------------------------<  305    4.6     |  20     |  3.19     Ca    10.2       24 Jan 2021 14:08  Mg     1.7       24 Jan 2021 14:08    TPro  7.5    /  Alb  2.8    /  TBili  0.8    /  DBili  x      /  AST  685    /  ALT  228    /  AlkPhos  66     24 Jan 2021 14:08

## 2021-01-25 DIAGNOSIS — E66.9 OBESITY, UNSPECIFIED: ICD-10-CM

## 2021-01-25 DIAGNOSIS — R06.2 WHEEZING: ICD-10-CM

## 2021-01-25 LAB
A1C WITH ESTIMATED AVERAGE GLUCOSE RESULT: 7.8 % — HIGH (ref 4–5.6)
ALBUMIN SERPL ELPH-MCNC: 2.7 G/DL — LOW (ref 3.3–5)
ALBUMIN SERPL ELPH-MCNC: 3 G/DL — LOW (ref 3.3–5)
ALP SERPL-CCNC: 53 U/L — SIGNIFICANT CHANGE UP (ref 40–120)
ALP SERPL-CCNC: 60 U/L — SIGNIFICANT CHANGE UP (ref 40–120)
ALT FLD-CCNC: 157 U/L — HIGH (ref 10–45)
ALT FLD-CCNC: 176 U/L — HIGH (ref 10–45)
ANION GAP SERPL CALC-SCNC: 15 MMOL/L — SIGNIFICANT CHANGE UP (ref 5–17)
ANION GAP SERPL CALC-SCNC: 18 MMOL/L — HIGH (ref 5–17)
ANION GAP SERPL CALC-SCNC: 18 MMOL/L — HIGH (ref 5–17)
AST SERPL-CCNC: 342 U/L — HIGH (ref 10–40)
AST SERPL-CCNC: 476 U/L — HIGH (ref 10–40)
BASOPHILS # BLD AUTO: 0.02 K/UL — SIGNIFICANT CHANGE UP (ref 0–0.2)
BASOPHILS NFR BLD AUTO: 0.1 % — SIGNIFICANT CHANGE UP (ref 0–2)
BILIRUB SERPL-MCNC: 0.5 MG/DL — SIGNIFICANT CHANGE UP (ref 0.2–1.2)
BILIRUB SERPL-MCNC: 0.6 MG/DL — SIGNIFICANT CHANGE UP (ref 0.2–1.2)
BUN SERPL-MCNC: 41 MG/DL — HIGH (ref 7–23)
BUN SERPL-MCNC: 43 MG/DL — HIGH (ref 7–23)
BUN SERPL-MCNC: 51 MG/DL — HIGH (ref 7–23)
CALCIUM SERPL-MCNC: 8.5 MG/DL — SIGNIFICANT CHANGE UP (ref 8.4–10.5)
CALCIUM SERPL-MCNC: 8.6 MG/DL — SIGNIFICANT CHANGE UP (ref 8.4–10.5)
CALCIUM SERPL-MCNC: 9.1 MG/DL — SIGNIFICANT CHANGE UP (ref 8.4–10.5)
CHLORIDE SERPL-SCNC: 95 MMOL/L — LOW (ref 96–108)
CHLORIDE SERPL-SCNC: 96 MMOL/L — SIGNIFICANT CHANGE UP (ref 96–108)
CHLORIDE SERPL-SCNC: 96 MMOL/L — SIGNIFICANT CHANGE UP (ref 96–108)
CK SERPL-CCNC: CRITICAL HIGH U/L (ref 25–170)
CO2 SERPL-SCNC: 17 MMOL/L — LOW (ref 22–31)
CO2 SERPL-SCNC: 17 MMOL/L — LOW (ref 22–31)
CO2 SERPL-SCNC: 18 MMOL/L — LOW (ref 22–31)
CREAT ?TM UR-MCNC: 89 MG/DL — SIGNIFICANT CHANGE UP
CREAT SERPL-MCNC: 3.76 MG/DL — HIGH (ref 0.5–1.3)
CREAT SERPL-MCNC: 4.2 MG/DL — HIGH (ref 0.5–1.3)
CREAT SERPL-MCNC: 4.8 MG/DL — HIGH (ref 0.5–1.3)
E COLI DNA BLD POS QL NAA+NON-PROBE: SIGNIFICANT CHANGE UP
EOSINOPHIL # BLD AUTO: 0 K/UL — SIGNIFICANT CHANGE UP (ref 0–0.5)
EOSINOPHIL NFR BLD AUTO: 0 % — SIGNIFICANT CHANGE UP (ref 0–6)
ESTIMATED AVERAGE GLUCOSE: 177 MG/DL — HIGH (ref 68–114)
GLUCOSE BLDC GLUCOMTR-MCNC: 161 MG/DL — HIGH (ref 70–99)
GLUCOSE BLDC GLUCOMTR-MCNC: 202 MG/DL — HIGH (ref 70–99)
GLUCOSE BLDC GLUCOMTR-MCNC: 212 MG/DL — HIGH (ref 70–99)
GLUCOSE BLDC GLUCOMTR-MCNC: 224 MG/DL — HIGH (ref 70–99)
GLUCOSE SERPL-MCNC: 201 MG/DL — HIGH (ref 70–99)
GLUCOSE SERPL-MCNC: 225 MG/DL — HIGH (ref 70–99)
GLUCOSE SERPL-MCNC: 252 MG/DL — HIGH (ref 70–99)
GRAM STN FLD: SIGNIFICANT CHANGE UP
HBV CORE AB SER-ACNC: SIGNIFICANT CHANGE UP
HBV SURFACE AB SER-ACNC: SIGNIFICANT CHANGE UP
HBV SURFACE AG SER-ACNC: SIGNIFICANT CHANGE UP
HCT VFR BLD CALC: 36.5 % — SIGNIFICANT CHANGE UP (ref 34.5–45)
HCV AB S/CO SERPL IA: 0.12 S/CO — SIGNIFICANT CHANGE UP
HCV AB SERPL-IMP: SIGNIFICANT CHANGE UP
HGB BLD-MCNC: 11.7 G/DL — SIGNIFICANT CHANGE UP (ref 11.5–15.5)
IMM GRANULOCYTES NFR BLD AUTO: 0.5 % — SIGNIFICANT CHANGE UP (ref 0–1.5)
LYMPHOCYTES # BLD AUTO: 0.59 K/UL — LOW (ref 1–3.3)
LYMPHOCYTES # BLD AUTO: 3.8 % — LOW (ref 13–44)
MAGNESIUM SERPL-MCNC: 1.5 MG/DL — LOW (ref 1.6–2.6)
MCHC RBC-ENTMCNC: 29.5 PG — SIGNIFICANT CHANGE UP (ref 27–34)
MCHC RBC-ENTMCNC: 32.1 GM/DL — SIGNIFICANT CHANGE UP (ref 32–36)
MCV RBC AUTO: 91.9 FL — SIGNIFICANT CHANGE UP (ref 80–100)
METHOD TYPE: SIGNIFICANT CHANGE UP
MONOCYTES # BLD AUTO: 1.58 K/UL — HIGH (ref 0–0.9)
MONOCYTES NFR BLD AUTO: 10.2 % — SIGNIFICANT CHANGE UP (ref 2–14)
NEUTROPHILS # BLD AUTO: 13.24 K/UL — HIGH (ref 1.8–7.4)
NEUTROPHILS NFR BLD AUTO: 85.4 % — HIGH (ref 43–77)
NRBC # BLD: 0 /100 WBCS — SIGNIFICANT CHANGE UP (ref 0–0)
PHOSPHATE SERPL-MCNC: 3.9 MG/DL — SIGNIFICANT CHANGE UP (ref 2.5–4.5)
PLATELET # BLD AUTO: 149 K/UL — LOW (ref 150–400)
POTASSIUM SERPL-MCNC: 4.4 MMOL/L — SIGNIFICANT CHANGE UP (ref 3.5–5.3)
POTASSIUM SERPL-MCNC: 4.6 MMOL/L — SIGNIFICANT CHANGE UP (ref 3.5–5.3)
POTASSIUM SERPL-MCNC: 4.6 MMOL/L — SIGNIFICANT CHANGE UP (ref 3.5–5.3)
POTASSIUM SERPL-SCNC: 4.4 MMOL/L — SIGNIFICANT CHANGE UP (ref 3.5–5.3)
POTASSIUM SERPL-SCNC: 4.6 MMOL/L — SIGNIFICANT CHANGE UP (ref 3.5–5.3)
POTASSIUM SERPL-SCNC: 4.6 MMOL/L — SIGNIFICANT CHANGE UP (ref 3.5–5.3)
PROT SERPL-MCNC: 5.9 G/DL — LOW (ref 6–8.3)
PROT SERPL-MCNC: 6.2 G/DL — SIGNIFICANT CHANGE UP (ref 6–8.3)
RBC # BLD: 3.97 M/UL — SIGNIFICANT CHANGE UP (ref 3.8–5.2)
RBC # FLD: 13.1 % — SIGNIFICANT CHANGE UP (ref 10.3–14.5)
SODIUM SERPL-SCNC: 129 MMOL/L — LOW (ref 135–145)
SODIUM SERPL-SCNC: 130 MMOL/L — LOW (ref 135–145)
SODIUM SERPL-SCNC: 131 MMOL/L — LOW (ref 135–145)
SODIUM UR-SCNC: 80 MMOL/L — SIGNIFICANT CHANGE UP
SPECIMEN SOURCE: SIGNIFICANT CHANGE UP
UUN UR-MCNC: <112 MG/DL — SIGNIFICANT CHANGE UP
WBC # BLD: 15.51 K/UL — HIGH (ref 3.8–10.5)
WBC # FLD AUTO: 15.51 K/UL — HIGH (ref 3.8–10.5)

## 2021-01-25 PROCEDURE — 99223 1ST HOSP IP/OBS HIGH 75: CPT

## 2021-01-25 PROCEDURE — 99233 SBSQ HOSP IP/OBS HIGH 50: CPT | Mod: GC

## 2021-01-25 PROCEDURE — 71045 X-RAY EXAM CHEST 1 VIEW: CPT | Mod: 26

## 2021-01-25 PROCEDURE — 71045 X-RAY EXAM CHEST 1 VIEW: CPT | Mod: 26,77

## 2021-01-25 RX ORDER — MAGNESIUM SULFATE 500 MG/ML
1 VIAL (ML) INJECTION ONCE
Refills: 0 | Status: COMPLETED | OUTPATIENT
Start: 2021-01-25 | End: 2021-01-25

## 2021-01-25 RX ORDER — ARIPIPRAZOLE 15 MG/1
1 TABLET ORAL
Qty: 0 | Refills: 0 | DISCHARGE

## 2021-01-25 RX ORDER — FUROSEMIDE 40 MG
0 TABLET ORAL
Qty: 0 | Refills: 0 | DISCHARGE

## 2021-01-25 RX ORDER — ARIPIPRAZOLE 15 MG/1
0 TABLET ORAL
Qty: 0 | Refills: 0 | DISCHARGE

## 2021-01-25 RX ORDER — ROSUVASTATIN CALCIUM 5 MG/1
0 TABLET ORAL
Qty: 0 | Refills: 0 | DISCHARGE

## 2021-01-25 RX ORDER — CEFTRIAXONE 500 MG/1
2000 INJECTION, POWDER, FOR SOLUTION INTRAMUSCULAR; INTRAVENOUS EVERY 24 HOURS
Refills: 0 | Status: DISCONTINUED | OUTPATIENT
Start: 2021-01-25 | End: 2021-01-27

## 2021-01-25 RX ORDER — LISINOPRIL 2.5 MG/1
0 TABLET ORAL
Qty: 0 | Refills: 0 | DISCHARGE

## 2021-01-25 RX ORDER — METFORMIN HYDROCHLORIDE 850 MG/1
0 TABLET ORAL
Qty: 0 | Refills: 0 | DISCHARGE

## 2021-01-25 RX ORDER — SAXAGLIPTIN 5 MG/1
0 TABLET, FILM COATED ORAL
Qty: 0 | Refills: 0 | DISCHARGE

## 2021-01-25 RX ADMIN — HEPARIN SODIUM 7500 UNIT(S): 5000 INJECTION INTRAVENOUS; SUBCUTANEOUS at 14:17

## 2021-01-25 RX ADMIN — HEPARIN SODIUM 7500 UNIT(S): 5000 INJECTION INTRAVENOUS; SUBCUTANEOUS at 22:30

## 2021-01-25 RX ADMIN — HEPARIN SODIUM 7500 UNIT(S): 5000 INJECTION INTRAVENOUS; SUBCUTANEOUS at 07:57

## 2021-01-25 RX ADMIN — ARIPIPRAZOLE 20 MILLIGRAM(S): 15 TABLET ORAL at 11:28

## 2021-01-25 RX ADMIN — Medication 4: at 12:38

## 2021-01-25 RX ADMIN — Medication 100 GRAM(S): at 09:19

## 2021-01-25 RX ADMIN — CEFTRIAXONE 100 MILLIGRAM(S): 500 INJECTION, POWDER, FOR SOLUTION INTRAMUSCULAR; INTRAVENOUS at 14:18

## 2021-01-25 RX ADMIN — Medication 3 MILLILITER(S): at 07:57

## 2021-01-25 RX ADMIN — CEFTRIAXONE 100 MILLIGRAM(S): 500 INJECTION, POWDER, FOR SOLUTION INTRAMUSCULAR; INTRAVENOUS at 16:57

## 2021-01-25 RX ADMIN — Medication 3 MILLILITER(S): at 22:31

## 2021-01-25 RX ADMIN — Medication 3 MILLILITER(S): at 14:17

## 2021-01-25 RX ADMIN — Medication 4: at 01:37

## 2021-01-25 RX ADMIN — Medication 100 GRAM(S): at 11:11

## 2021-01-25 RX ADMIN — Medication 3 MILLILITER(S): at 00:31

## 2021-01-25 RX ADMIN — SODIUM CHLORIDE 200 MILLILITER(S): 9 INJECTION INTRAMUSCULAR; INTRAVENOUS; SUBCUTANEOUS at 07:57

## 2021-01-25 RX ADMIN — Medication 4: at 09:03

## 2021-01-25 RX ADMIN — Medication 2: at 22:32

## 2021-01-25 RX ADMIN — Medication 3 MILLILITER(S): at 11:11

## 2021-01-25 NOTE — CONSULT NOTE ADULT - SUBJECTIVE AND OBJECTIVE BOX
HPI:  70F PMH Asthma (Dx after 9/11 attack) HTN, DM, HLD, L hip arthritis, Bipolar Disorder presented for evaluation of generalized weakness, malaise, urinary symptoms and mechanical fall - down for 8 hours. Patient reports that she has had urinary frequency for the last 2 days and has been feeling general malaise and fatigue. She has been laying in bed for then last 2 days and had fell out of bed yesterday and stayed on the floor for 8hours. She reports that she hit her L knee and L elbow and was too weak to get up which prompted her to call EMS. Denies fevers, nausea, vomiting, chest pain, abdominal pain, changes in mental status, hematuria, constipation. Endorses that she has a R hand intention tremor that developed several months ago, as well as a poor functional capacity, unable to walk more than 2 blocks without feeling shortness of breath.     In the ED VS T98.3 -102.4 BP 92/61 HR 95 R16 94% on RA  Labs: WBC 22 | Hg/Hct  13/40 | Plt 197 |   Na 132 K 4.6 | BUN/ Cr 39/3.19 | TP/Alb 7.5/2.8 | AST/ALT /22/ 66 |   Lactate 3.1 > 3.1 | COVID negative  UA +   Imaging: US Abd - Hepatomegaly and hepatic steatosis. Cholelithiasis without evidence of acute cholecystitis. No biliary ductal dilatation.  Xray Hip and L knee: No evidence of acute fracture or dislocation  CXR: No acute infiltrates     ED Course: Tylenol 650mg, Ceftriaxone 1g , TDaop, 2L NS and started on 200cc/hr   (24 Jan 2021 21:22)    Pulmonary consulted for episode of wheezing overnight and recommendations regarding asthma management and wheezing. Pertinent pulmonary history: Former smoker 1.5 PPD/20 years between ages [age], no longer smoking. Reportedly diagnosed with asthma after 9/11-patient lived in [place] followed with pulmonologist Dr. Alvarenga who has since retired.    HPI:  70F PMH Asthma (Dx after  attack) HTN, DM, HLD, L hip arthritis, Bipolar Disorder presented for evaluation of generalized weakness, malaise, urinary symptoms and mechanical fall - down for 8 hours. Patient reports that she has had urinary frequency for the last 2 days and has been feeling general malaise and fatigue. She has been laying in bed for then last 2 days and had fell out of bed yesterday and stayed on the floor for 8hours. She reports that she hit her L knee and L elbow and was too weak to get up which prompted her to call EMS. Denies fevers, nausea, vomiting, chest pain, abdominal pain, changes in mental status, hematuria, constipation. Endorses that she has a R hand intention tremor that developed several months ago, as well as a poor functional capacity, unable to walk more than 2 blocks without feeling shortness of breath.     In the ED VS T98.3 -102.4 BP 92/61 HR 95 R16 94% on RA  Labs: WBC 22 | Hg/Hct  13/40 | Plt 197 |   Na 132 K 4.6 | BUN/ Cr 39/3.19 | TP/Alb 7.5/2.8 | AST/ALT /22/ 66 |   Lactate 3.1 > 3.1 | COVID negative  UA +   Imaging: US Abd - Hepatomegaly and hepatic steatosis. Cholelithiasis without evidence of acute cholecystitis. No biliary ductal dilatation.  Xray Hip and L knee: No evidence of acute fracture or dislocation  CXR: No acute infiltrates     ED Course: Tylenol 650mg, Ceftriaxone 1g , TDaop, 2L NS and started on 200cc/hr   (2021 21:22)    Pulmonary consulted for episode of wheezing overnight and recommendations regarding asthma management and wheezing. Pertinent pulmonary history: Former smoker 1.5 PPD/20 years between ages [age], no longer smoking. Reportedly diagnosed with asthma after -patient lived in [place] followed with pulmonologist Dr. Alvarenga who has since retired. Has not used inhalers in last 4 years, does not endorse BLEVINS, or chronic cough. No known history of asthma exacerbations.     PAST MEDICAL & SURGICAL HISTORY:  HLD (hyperlipidemia)    Bipolar disorder    Arthritis of left hip    Asthma    Diabetes mellitus    HTN (hypertension)    No significant past surgical history      Home Medications:  ARIPiprazole 20 mg oral tablet: 1 tab(s) orally once a day (2021 11:01)  furosemide 20 mg oral tablet: 1 tab(s) orally once a day (2021 11:01)  lisinopril 40 mg oral tablet: 1 tab(s) orally once a day (2021 11:01)  metFORMIN 1000 mg oral tablet: 1 tab(s) orally 2 times a day (2021 11:01)  Onglyza 5 mg oral tablet: 1 tab(s) orally once a day (2021 11:01)  rosuvastatin 5 mg oral tablet: 1 tab(s) orally once a day (2021 11:01)    Social History:  30 pack year smoking history, No alcohol use  No illicit drug use  Uses walker and lives alone (2021 21:22)    FAMILY HISTORY:  No pertinent family history in first degree relatives      Vital Signs (24 Hrs):  T(C): 36.5 (21 @ 13:08), Max: 39.1 (21 @ 17:05)  HR: 91 (21 @ 13:08) (81 - 94)  BP: 163/78 (21 @ 13:08) (120/72 - 163/78)  RR: 17 (21 @ 13:08) (16 - 18)  SpO2: 94% (21 @ 13:08) (94% - 95%)  Wt(kg): --    PHYSICAL EXAM:  GENERAL: NAD. Morbidly obese.   HEENT: HD cath site c/d/i. No -JVD  CHEST/LUNG: CTA B/L. No wheezes.   HEART: RRR s1/s2.   ABDOMEN: NTND BS+4.   EXTREMITIES:  Trace pitting edema.   PSYCH: AAOx3, cooperative, appropriate  NEUROLOGY: No gross FND  SKIN: No rashes or lesions    .  LABS:                         11.7   15.51 )-----------( 149      ( 2021 06:47 )             36.5         129<L>  |  96  |  51<H>  ----------------------------<  201<H>  4.4   |  18<L>  |  4.80<H>    Ca    8.6      2021 15:09  Phos  3.9       Mg     1.5         TPro  5.9<L>  /  Alb  2.7<L>  /  TBili  0.5  /  DBili  x   /  AST  342<H>  /  ALT  157<H>  /  AlkPhos  60        Urinalysis Basic - ( 2021 15:19 )    Color: Yellow / Appearance: Clear / S.025 / pH: x  Gluc: x / Ketone: Trace mg/dL  / Bili: Small / Urobili: 0.2 E.U./dL   Blood: x / Protein: >=300 mg/dL / Nitrite: POSITIVE   Leuk Esterase: Large / RBC: Many /HPF / WBC Many /HPF   Sq Epi: x / Non Sq Epi: 0-5 /HPF / Bacteria: Present /HPF      CARDIAC MARKERS ( 2021 06:47 )  x     / x     / 93692 U/L / x     / x      CARDIAC MARKERS ( 2021 22:58 )  x     / x     / 13113 U/L / x     / x      CARDIAC MARKERS ( 2021 14:08 )  x     / x     / 86443 U/L / x     / x                RADIOLOGY, EKG & ADDITIONAL TESTS: Reviewed.    HPI:  70F PMH Asthma (Dx after  attack) HTN, DM, HLD, L hip arthritis, Bipolar Disorder presented for evaluation of generalized weakness, malaise, urinary symptoms and mechanical fall - down for 8 hours. Patient reports that she has had urinary frequency for the last 2 days and has been feeling general malaise and fatigue. She has been laying in bed for then last 2 days and had fell out of bed yesterday and stayed on the floor for 8hours. She reports that she hit her L knee and L elbow and was too weak to get up which prompted her to call EMS. Denies fevers, nausea, vomiting, chest pain, abdominal pain, changes in mental status, hematuria, constipation. Endorses that she has a R hand intention tremor that developed several months ago, as well as a poor functional capacity, unable to walk more than 2 blocks without feeling shortness of breath.    Pulmonary consulted for episode of wheezing overnight and recommendations regarding asthma management and wheezing. Pertinent pulmonary history: Former smoker 1.5 PPD/20 years between ages [age], no longer smoking. Reportedly diagnosed with asthma after -patient lived in [place] followed with pulmonologist Dr. Alvarenga who has since retired. Has not used inhalers in last 4 years, does not endorse BLEVINS, or chronic cough. No known history of asthma exacerbations.     REVIEW OF SYSTEMS:    CONSTITUTIONAL: No fevers or chills  EYES/ENT: No visual or hearing changes; no throat pain or difficulty swallowing  NECK: No pain or stiffness  RESPIRATORY: No cough, wheezing; No shortness of breath  CARDIOVASCULAR: No chest pain or palpitations  GASTROINTESTINAL: No abdominal pain. No nausea, vomiting; No diarrhea or constipation.  GENITOURINARY: No dysuria, frequency or hematuria  NEUROLOGICAL: No numbness or weakness  SKIN: No itching, burning, rashes, or lesions   All other review of systems is negative unless indicated above.  PAST MEDICAL & SURGICAL HISTORY:  HLD (hyperlipidemia)    Bipolar disorder    Arthritis of left hip    Asthma    Diabetes mellitus    HTN (hypertension)    No significant past surgical history      Home Medications:  ARIPiprazole 20 mg oral tablet: 1 tab(s) orally once a day (2021 11:01)  furosemide 20 mg oral tablet: 1 tab(s) orally once a day (2021 11:01)  lisinopril 40 mg oral tablet: 1 tab(s) orally once a day (2021 11:01)  metFORMIN 1000 mg oral tablet: 1 tab(s) orally 2 times a day (2021 11:01)  Onglyza 5 mg oral tablet: 1 tab(s) orally once a day (2021 11:01)  rosuvastatin 5 mg oral tablet: 1 tab(s) orally once a day (2021 11:01)    Social History:  30 pack year smoking history, No alcohol use  No illicit drug use  Uses walker and lives alone (2021 21:22)    FAMILY HISTORY:  No pertinent family history in first degree relatives      Vital Signs (24 Hrs):  T(C): 36.5 (21 @ 13:08), Max: 39.1 (21 @ 17:05)  HR: 91 (21 @ 13:08) (81 - 94)  BP: 163/78 (21 @ 13:08) (120/72 - 163/78)  RR: 17 (21 @ 13:08) (16 - 18)  SpO2: 94% (21 @ 13:08) (94% - 95%)  Wt(kg): --    PHYSICAL EXAM:  GENERAL: NAD. Morbidly obese.   HEENT: HD cath site c/d/i. No -JVD  CHEST/LUNG: CTA B/L. No wheezes.   HEART: RRR s1/s2.   ABDOMEN: NTND BS+4.   EXTREMITIES:  Trace pitting edema.   PSYCH: AAOx3, cooperative, appropriate  NEUROLOGY: No gross FND  SKIN: No rashes or lesions    .  LABS:                         11.7   15.51 )-----------( 149      ( 2021 06:47 )             36.5         129<L>  |  96  |  51<H>  ----------------------------<  201<H>  4.4   |  18<L>  |  4.80<H>    Ca    8.6      2021 15:09  Phos  3.9       Mg     1.5         TPro  5.9<L>  /  Alb  2.7<L>  /  TBili  0.5  /  DBili  x   /  AST  342<H>  /  ALT  157<H>  /  AlkPhos  60        Urinalysis Basic - ( 2021 15:19 )    Color: Yellow / Appearance: Clear / S.025 / pH: x  Gluc: x / Ketone: Trace mg/dL  / Bili: Small / Urobili: 0.2 E.U./dL   Blood: x / Protein: >=300 mg/dL / Nitrite: POSITIVE   Leuk Esterase: Large / RBC: Many /HPF / WBC Many /HPF   Sq Epi: x / Non Sq Epi: 0-5 /HPF / Bacteria: Present /HPF      CARDIAC MARKERS ( 2021 06:47 )  x     / x     / 40431 U/L / x     / x      CARDIAC MARKERS ( 2021 22:58 )  x     / x     / 50809 U/L / x     / x      CARDIAC MARKERS ( 2021 14:08 )  x     / x     / 76529 U/L / x     / x                RADIOLOGY, EKG & ADDITIONAL TESTS: Reviewed.

## 2021-01-25 NOTE — PATIENT PROFILE ADULT - NSPROGENSOURCEINFO_GEN_A_NUR
Patient Seen in: [facility] AND [facility] Emergency Department    History   Patient presents with:  Nausea    Stated Complaint: N/V/D x 3 days    HPI    Patient is a 38-year-old female who presents with dizziness ×3 days.   Patient states it is worse with standi Never Smoker                      Alcohol Use: No                Review of Systems    Positive for stated complaint: N/V/D x 3 days  Other systems are as noted in HPI. Constitutional and vital signs reviewed.       All other systems reviewed and negative e Triglycerides 357 (*)     Non HDL Chol 170 (*)     All other components within normal limits   CBC W/ DIFFERENTIAL - Abnormal; Notable for the following:     MCH 26.4 (*)     All other components within normal limits   EM POCT PREGNANCY URINE - Normal   C patient

## 2021-01-25 NOTE — PHYSICAL THERAPY INITIAL EVALUATION ADULT - ADDITIONAL COMMENTS
Pt reports living with her mother, has elevator access (no GREGG), and uses a walker at home. Pt was able to tend to her own ADLs and assist her mother.

## 2021-01-25 NOTE — CONSULT NOTE ADULT - SUBJECTIVE AND OBJECTIVE BOX
Patient is a 70y old  Female who presents with a chief complaint of rhabdomyolysis/ FACUNDO/ UTI (2021 21:22)       HPI:  70F PMH Asthma (Dx after  attack) HTN, DM, HLD, L hip arthritis, Bipolar Disorder presented for evaluation of generalized weakness, malaise, urinary symptoms and mechanical fall - down for 8 hours. Patient reports that she has had urinary frequency for the last 2 days and has been feeling general malaise and fatigue. She has been laying in bed for then last 2 days and had fell out of bed yesterday and stayed on the floor for 8hours. She reports that she hit her L knee and L elbow and was too weak to get up which prompted her to call EMS. Denies fevers, nausea, vomiting, chest pain, abdominal pain, changes in mental status, hematuria, constipation. Endorses that she has a R hand intention tremor that developed several months ago, as well as a poor functional capacity, unable to walk more than 2 blocks without feeling shortness of breath.     In the ED VS T98.3 -102.4 BP 92/61 HR 95 R16 94% on RA  Labs: WBC 22 | Hg/Hct  13/40 | Plt 197 |   Na 132 K 4.6 | BUN/ Cr 39/3.19 | TP/Alb 7.5/2.8 | AST/ALT /22/ 66 |   Lactate 3.1 > 3.1 | COVID negative  UA +   Imaging: US Abd - Hepatomegaly and hepatic steatosis. Cholelithiasis without evidence of acute cholecystitis. No biliary ductal dilatation.  Xray Hip and L knee: No evidence of acute fracture or dislocation  CXR: No acute infiltrates     ED Course: Tylenol 650mg, Ceftriaxone 1g , TDaop, 2L NS and started on 200cc/hr   (2021 21:22)      PAST MEDICAL & SURGICAL HISTORY:  HLD (hyperlipidemia)    Bipolar disorder    Arthritis of left hip    Asthma    Diabetes mellitus    HTN (hypertension)    No significant past surgical history        MEDICATIONS  (STANDING):  albuterol/ipratropium for Nebulization 3 milliLiter(s) Nebulizer every 4 hours  ARIPiprazole 20 milliGRAM(s) Oral daily  cefTRIAXone   IVPB 1000 milliGRAM(s) IV Intermittent every 24 hours  dextrose 40% Gel 15 Gram(s) Oral once  dextrose 5%. 1000 milliLiter(s) (50 mL/Hr) IV Continuous <Continuous>  dextrose 50% Injectable 25 Gram(s) IV Push once  glucagon  Injectable 1 milliGRAM(s) IntraMuscular once  heparin   Injectable 7500 Unit(s) SubCutaneous every 8 hours  insulin lispro (ADMELOG) corrective regimen sliding scale   SubCutaneous Before meals and at bedtime  sodium chloride 0.9%. 1000 milliLiter(s) (200 mL/Hr) IV Continuous <Continuous>    MEDICATIONS  (PRN):        FAMILY HISTORY:  No pertinent family history in first degree relatives        CBC Full  -  ( 2021 06:47 )  WBC Count : 15.51 K/uL  RBC Count : 3.97 M/uL  Hemoglobin : 11.7 g/dL  Hematocrit : 36.5 %  Platelet Count - Automated : 149 K/uL  Mean Cell Volume : 91.9 fl  Mean Cell Hemoglobin : 29.5 pg  Mean Cell Hemoglobin Concentration : 32.1 gm/dL  Auto Neutrophil # : 13.24 K/uL  Auto Lymphocyte # : 0.59 K/uL  Auto Monocyte # : 1.58 K/uL  Auto Eosinophil # : 0.00 K/uL  Auto Basophil # : 0.02 K/uL  Auto Neutrophil % : 85.4 %  Auto Lymphocyte % : 3.8 %  Auto Monocyte % : 10.2 %  Auto Eosinophil % : 0.0 %  Auto Basophil % : 0.1 %          131<L>  |  96  |  43<H>  ----------------------------<  225<H>  4.6   |  17<L>  |  4.20<H>    Ca    8.5      2021 06:47  Phos  3.9       Mg     1.5         TPro  5.9<L>  /  Alb  2.7<L>  /  TBili  0.5  /  DBili  x   /  AST  342<H>  /  ALT  157<H>  /  AlkPhos  60        Urinalysis Basic - ( 2021 15:19 )    Color: Yellow / Appearance: Clear / S.025 / pH: x  Gluc: x / Ketone: Trace mg/dL  / Bili: Small / Urobili: 0.2 E.U./dL   Blood: x / Protein: >=300 mg/dL / Nitrite: POSITIVE   Leuk Esterase: Large / RBC: Many /HPF / WBC Many /HPF   Sq Epi: x / Non Sq Epi: 0-5 /HPF / Bacteria: Present /HPF          Radiology:    < from: Xray Knee 3 Views, Left (21 @ 13:43) >    EXAM:  XR KNEE 3 VIEWS LT                           PROCEDURE DATE:  2021          INTERPRETATION:  Clinical History: Pain    2 views of the left knee demonstrates no evidence of acute fracture or dislocation. Degenerative changes noted with narrowing of the medial femorotibial joint space.    Impression: No evidence of acute fracture or dislocation.        < from: Xray Hip w/ Pelvis 2 or 3 Views, Left (21 @ 13:44) >    EXAM:  XR HIP WITH PELV 2-3V LT                           PROCEDURE DATE:  2021          INTERPRETATION:  Clinical History: Pain    Examination of the pelvis and left hip demonstrates no evidence of acute fracture or dislocation. Degenerativechanges lower lumbosacral spine. Vascular calcifications overlying pelvic region.    Impression: No evidence of acute fracture or dislocation        < from: CT Head No Cont (21 @ 13:20) >  EXAM:  CT BRAIN                           PROCEDURE DATE:  2021          INTERPRETATION:  Jose LOCKE MD, have reviewed the images and the report and agree with the findings.    INDICATIONS: Trauma, fall, left upper extremity weakness.    TECHNIQUE: Serial axial images were obtained from the skull base to the vertex without the use of intravenous contrast. Sagittal and coronal reformats were also reviewed.    COMPARISON EXAMINATION: None.    FINDINGS:  Evaluation slightly limitedby motion artifact.    VENTRICLES AND SULCI: Age appropriate parenchymal volume. No hydrocephalus.  INTRA-AXIAL: No mass effect, acute hemorrhage, or midline shift. There is patchy hypoattenuation of the periventricular and subcortical white matter, likely due to small vessel ischemic disease. No CT evidence of acute transcortical infarction.  EXTRA-AXIAL: No extra-axial fluid collection.  VISUALIZED SINUSES: Imaged portions of paranasal sinuses are well aerated.  VISUALIZED MASTOIDS: Trace opacification right mastoid air cells. Left mastoid air cells are well aerated.  CALVARIUM: No acute fracture. 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    IMPRESSION:  No acute intracranial hemorrhage or calvarial fracture.        < from: CT Cervical Spine No Cont (21 @ 13:20) >  EXAM:  CT CERVICAL SPINE                           PROCEDURE DATE:  2021          INTERPRETATION:  PROCEDURE: CT cervical spine without contrast    INDICATION: Fall, pain    TECHNIQUE: Multiple axial sections were obtained from the mid orbitsto the sternoclavicular joint. Sagittal and coronal reformats were obtained from the axial data set. The images were reviewed in soft tissue and bone windows.    COMPARISON: None    FINDINGS: The CT exam demonstrates loss of the normal cervical lordosis which may be secondary to positioning. There is trace anterolisthesis of C3 on C4. The vertebral body heights and intervertebral disc spaces are maintained. There is a retropharyngeal course of the right common carotid artery and proximal right internal carotid artery.. The osseous structures are intact without fracture.    At the C2/3 level, there is uncovertebral and facet hypertrophy resulting in mild right neural foraminal narrowing..    At the C3/4 level, there is uncovertebral and facet hypertrophy resulting in mild to moderate right and mild left neural foraminal narrowing. There is a disc bulge without significant spinal canal stenosis.    At the C4/5 level, there is posterior osseous ridging without significant spinal canal stenosis. There is uncovertebral and facet hypertrophy with mild to moderate right and mild left neural foraminal narrowing..    At the C5/6 level, there is posterior osseous ridging asymmetric to the left resulting in mild neural foraminal narrowing. Thereis uncovertebral and facet hypertrophy resulting in moderate bilateral neural foraminal narrowing    At the C6/7 level, there is posterior osseous ridging resulting in mild spinal canal stenosis. There is uncovertebral spurring resulting in moderate right and and mild-to-moderate left neural foraminal narrowing.    At the C7/T1 level, there is no disc herniation. There is no central spinal canal stenosis or neural foramen narrowing.    IMPRESSION:    No acute fracture or malalignment. Multilevel degenerative changes as above              Vital Signs Last 24 Hrs  T(C): 38.4 (2021 05:37), Max: 39.1 (2021 17:05)  T(F): 101.2 (2021 05:37), Max: 102.4 (2021 17:05)  HR: 83 (2021 05:37) (81 - 95)  BP: 138/70 (2021 05:37) (92/61 - 146/74)  BP(mean): --  RR: 18 (2021 05:37) (16 - 18)  SpO2: 94% (2021 05:37) (94% - 95%)    REVIEW OF SYSTEMS: s/p fall at home, weakness          Physical Exam: 69 yo  woman lying in semi Morrow's position, c/o weakness    Head: normocephalic, atraumatic    Eyes: PERRLA, EOMI, no nystagmus, sclera anicteric    ENT: nasal discharge, uvula midline, no oropharyngeal erythema/exudate    Neck: supple, negative JVD, negative carotid bruits, no thyromegaly    Chest: CTA bilaterally, neg wheeze/ rhonchi/ rales/ crackles/ egophany    Cardiovascular: regular rate and rhythm, neg murmurs/rubs/gallops    Abdomen: soft, non distended, non tender to palpation in all 4 quadrants, negative rebound/guarding, normal bowel sounds    Extremities: WWP, neg cyanosis/clubbing/edema, negative calf tenderness to palpation, negative Angela's sign    Musculoskeletal:    Skin:      :     Neurologic Exam:    Alert and oriented to person, place, date/year, speech fluent w/o dysarthria, follows commands, recent and remote memory intact, repetition intact, comprehension intact,  attention/concentration intact, fund of knowledge appropriate    Cranial Nerves:     II:                       pupils equal, round and reactive to light, visual fields intact   III/ IV/VI:            extraocular movements intact, neg nystagmus, neg ptosis  V:                       facial sensation intact, V1-3 normal  VII:                     face symmetric, no droop, normal eye closure and smile  VIII:                    hearing intact to finger rub bilaterally  IX/ X:                 soft palate rise symmetrical  XI:                      head turning, shoulder shrug normal  XII:                     tongue midline    Motor Exam:    Right UE:   no focal weakness > 3+/5                     pronator drift: neg    Left UE:       no focal weakness > 3+/5                        pronator drift: neg      Right LE:    no focal weakness > 3+/5    Left LE:    no focal weakness > 3+/5               Sensation: Intact to light touch x 4 extremities                                              DTR:                        biceps/brachioradialis: equal bilaterally                 patella/ankle: equal bilaterally                  neg clonus          neg Babinski                        Finger to Nose:  neg dysmetria bilaterally          R:            L:     Heel to shin: wnl bilaterally          R:            L:     Rapid Alternating movements:  neg dysdiadochokinesia bilaterally          R:            L:     Joint Position Sense: wnl bilaterally          R:           L:     Romberg:  not tested    Tandem Walking:  not tested    Gait:  not tested        PM&R Impression:    1) s/p fall  2) rhabdomyolysis/ FACUNDO  3) deconditioned  4) no focal weakness    Plan:    1) Physical therapy focusing on therapeutic exercises, bed mobility/transfer out of bed evaluation, progressive ambulation with assistive devices prn.    2) Anticipated Disposition Plan/Recs:    pending functional progress
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
## 2021-01-25 NOTE — PHYSICAL THERAPY INITIAL EVALUATION ADULT - PERTINENT HX OF CURRENT PROBLEM, REHAB EVAL
70F PMH Asthma (Dx after 9/11 attack) HTN, DM, HLD, L hip arthritis, Bipolar Disorder presented for evaluation of generalized weakness, malaise, urinary symptoms and mechanical fall - down for 8 hours.

## 2021-01-25 NOTE — PROGRESS NOTE ADULT - SUBJECTIVE AND OBJECTIVE BOX
O/N Events: Admitted overnight   Subjective/ROS: Patient stated she had not urinated since 8:00 P.M.  Denies HA, CP, SOB, n/v, changes in bowel/urinary habits.  12pt ROS otherwise negative.    VITALS  Vital Signs Last 24 Hrs  T(C): 36.5 (2021 13:08), Max: 39.1 (2021 17:05)  T(F): 97.7 (2021 13:08), Max: 102.4 (2021 17:05)  HR: 91 (2021 13:08) (81 - 94)  BP: 163/78 (2021 13:08) (120/72 - 163/78)  BP(mean): --  RR: 17 (2021 13:08) (16 - 18)  SpO2: 94% (2021 13:08) (94% - 95%)    CAPILLARY BLOOD GLUCOSE      POCT Blood Glucose.: 202 mg/dL (2021 11:50)  POCT Blood Glucose.: 212 mg/dL (2021 08:35)  POCT Blood Glucose.: 224 mg/dL (2021 01:28)      PHYSICAL EXAM  General: A&Ox3; NAD  Head: NC/AT; MMM; PERRL; EOMI;  Neck: Supple; no JVD  Respiratory: Slight wheezing heard on exam   Cardiovascular: Regular rhythm/rate; S1/S2   Gastrointestinal: Soft; NTND; normoactive BS  Extremities: WWP; no edema/cyanosis  Neurological:  CNII-XII grossly intact; no obvious focal deficits    MEDICATIONS  (STANDING):  albuterol/ipratropium for Nebulization 3 milliLiter(s) Nebulizer every 4 hours  ARIPiprazole 20 milliGRAM(s) Oral daily  cefTRIAXone   IVPB 2000 milliGRAM(s) IV Intermittent every 24 hours  dextrose 40% Gel 15 Gram(s) Oral once  dextrose 5%. 1000 milliLiter(s) (50 mL/Hr) IV Continuous <Continuous>  dextrose 50% Injectable 25 Gram(s) IV Push once  glucagon  Injectable 1 milliGRAM(s) IntraMuscular once  heparin   Injectable 7500 Unit(s) SubCutaneous every 8 hours  insulin lispro (ADMELOG) corrective regimen sliding scale   SubCutaneous Before meals and at bedtime  sodium chloride 0.9%. 1000 milliLiter(s) (200 mL/Hr) IV Continuous <Continuous>    MEDICATIONS  (PRN):      No Known Allergies      LABS                        11.7   15.51 )-----------( 149      ( 2021 06:47 )             36.5         129<L>  |  96  |  51<H>  ----------------------------<  201<H>  4.4   |  18<L>  |  4.80<H>    Ca    8.6      2021 15:09  Phos  3.9       Mg     1.5         TPro  5.9<L>  /  Alb  2.7<L>  /  TBili  0.5  /  DBili  x   /  AST  342<H>  /  ALT  157<H>  /  AlkPhos  60        Urinalysis Basic - ( 2021 15:19 )    Color: Yellow / Appearance: Clear / S.025 / pH: x  Gluc: x / Ketone: Trace mg/dL  / Bili: Small / Urobili: 0.2 E.U./dL   Blood: x / Protein: >=300 mg/dL / Nitrite: POSITIVE   Leuk Esterase: Large / RBC: Many /HPF / WBC Many /HPF   Sq Epi: x / Non Sq Epi: 0-5 /HPF / Bacteria: Present /HPF      CARDIAC MARKERS ( 2021 06:47 )  x     / x     / 56313 U/L / x     / x      CARDIAC MARKERS ( 2021 22:58 )  x     / x     / 79629 U/L / x     / x      CARDIAC MARKERS ( 2021 14:08 )  x     / x     / 98433 U/L / x     / x

## 2021-01-25 NOTE — PROGRESS NOTE ADULT - PROBLEM SELECTOR PLAN 3
CK 18K with transaminitis and FACUNDO after being down for 8 hours now downtrending  - c/w IVF NS 200cc/hr   - Monitor EKG  - Trend CMP (LFTs Cr and CPK) q6hrs    #Transaminitis  US showed hepatomegaly and cholelithiasis likely in setting of hepatosteatosis  - Trend LFTs  - Holding statin and ACE

## 2021-01-25 NOTE — PHYSICAL THERAPY INITIAL EVALUATION ADULT - LEVEL OF INDEPENDENCE: SIT/STAND, REHAB EVAL
Pt expressed dizziness and FOF. Not safe to attempt STS with 1 person assist at this time./unable to perform

## 2021-01-25 NOTE — PROGRESS NOTE ADULT - SUBJECTIVE AND OBJECTIVE BOX
Patient was seen and evaluated on dialysis.   HR: 91 (01-25-21 @ 13:08)  BP: 163/78 (01-25-21 @ 13:08)  01-25    129<L>  |  96  |  51<H>  ----------------------------<  201<H>  4.4   |  18<L>  |  4.80<H>    Ca    8.6      25 Jan 2021 15:09  Phos  3.9     01-25  Mg     1.5     01-25    TPro  5.9<L>  /  Alb  2.7<L>  /  TBili  0.5  /  DBili  x   /  AST  342<H>  /  ALT  157<H>  /  AlkPhos  60  01-25    Continue dialysis:   Dialyzer:  Optiflux 160        QB: 200 ml/min  K bath: 2  Goal UF: 2L  Duration: 20 min    Patient is tolerating the procedure well.   Continue full hemodialysis treatment as prescribed.

## 2021-01-25 NOTE — PROGRESS NOTE ADULT - PROBLEM SELECTOR PLAN 1
Noted urinary frequency with +UA  leukocytosis and SBP 92 on admission with T102 in ED qSOFA 1 (BP) and elevated lactate - meeting sepsis criteria.  - Blood cultures growing gram negative rods   - increased CTX to 2g daily

## 2021-01-25 NOTE — CONSULT NOTE ADULT - SUBJECTIVE AND OBJECTIVE BOX
Patient is a 70y old  Female who presents with a chief complaint of rhabdomyolysis/ FACUNDO/ UTI (2021 16:10)    HPI:  70F PMH Asthma (Dx after  attack) HTN, DM, HLD, L hip arthritis, Bipolar Disorder presented for evaluation of generalized weakness, malaise, urinary symptoms and mechanical fall - down for 8 hours. Patient reports that she has had urinary frequency for the last 2 days and has been feeling general malaise and fatigue. She has been laying in bed for then last 2 days and had fell out of bed yesterday and stayed on the floor for 8hours. She reports that she hit her L knee and L elbow and was too weak to get up which prompted her to call EMS. Has poor functional capacity, unable to walk more than 2 blocks without feeling shortness of breath.     Was being managed for rhabdo with IVF. S/p 2L saline in ER and was on 200cc/hr since 6pm yesterday. Received ~6L and voided 35cc. Volume overloaded    PAST MEDICAL & SURGICAL HISTORY:  HLD (hyperlipidemia)    Bipolar disorder    Arthritis of left hip    Asthma    Diabetes mellitus    HTN (hypertension)    No significant past surgical history        Allergies:  No Known Allergies      Home Medications:   albuterol/ipratropium for Nebulization 3 milliLiter(s) Nebulizer every 4 hours  ARIPiprazole 20 milliGRAM(s) Oral daily  cefTRIAXone   IVPB 2000 milliGRAM(s) IV Intermittent every 24 hours  dextrose 40% Gel 15 Gram(s) Oral once  dextrose 5%. 1000 milliLiter(s) IV Continuous <Continuous>  dextrose 50% Injectable 25 Gram(s) IV Push once  glucagon  Injectable 1 milliGRAM(s) IntraMuscular once  heparin   Injectable 7500 Unit(s) SubCutaneous every 8 hours  insulin lispro (ADMELOG) corrective regimen sliding scale   SubCutaneous Before meals and at bedtime      Hospital Medications:   MEDICATIONS  (STANDING):  albuterol/ipratropium for Nebulization 3 milliLiter(s) Nebulizer every 4 hours  ARIPiprazole 20 milliGRAM(s) Oral daily  cefTRIAXone   IVPB 2000 milliGRAM(s) IV Intermittent every 24 hours  dextrose 40% Gel 15 Gram(s) Oral once  dextrose 5%. 1000 milliLiter(s) (50 mL/Hr) IV Continuous <Continuous>  dextrose 50% Injectable 25 Gram(s) IV Push once  glucagon  Injectable 1 milliGRAM(s) IntraMuscular once  heparin   Injectable 7500 Unit(s) SubCutaneous every 8 hours  insulin lispro (ADMELOG) corrective regimen sliding scale   SubCutaneous Before meals and at bedtime      SOCIAL HISTORY:  Denies ETOh, Smoking,     Family History:  FAMILY HISTORY:  No pertinent family history in first degree relatives        ROS:  CONSTITUTIONAL: No fever or chills.  HEENT: No headche, visual disturbances, hearing impairment.  RESPIRATORY: No shortness of breath, cough, wheezing or hemoptysis  CARDIOVASCULAR: No Chest pain, shortness of breath, palpitations, dizziness, syncope, orthopnea, PND or leg swelling.  GASTROINTESTINAL: No abdominal pain, nausea, vomiting, diarrhea, hematemesis or blood per rectum.  GENITOURINARY: No urinary frequency, urgency, gross hematuria, dysuria, foamy urine, flank or supra pubic pain, no prior history of kidney stones.  NEUROLOGICAL: No headache,  focal limb weakness, tingling or numbness sensation or seizure like activity  SKIN: No rash or skin lesion  MUSCULOSKELETAL: No leg pain, calf pain or swelling  Psych: Denies suicidal or homicidal ideation    VITALS:  T(F): 97.7 (21 @ 13:08), Max: 102.4 (21 @ 17:05)  HR: 91 (21 @ 13:08)  BP: 163/78 (21 @ 13:08)  RR: 17 (21 @ 13:08)  SpO2: 94% (21 @ 13:08)  Wt(kg): --     07:  -   @ 07:00  --------------------------------------------------------  IN: 2350 mL / OUT: 0 mL / NET: 2350 mL     @ 07:  -   @ 16:38  --------------------------------------------------------  IN: 0 mL / OUT: 50 mL / NET: -50 mL        Weight (kg): 91 ( @ 17:45)  CAPILLARY BLOOD GLUCOSE      POCT Blood Glucose.: 202 mg/dL (2021 11:50)  POCT Blood Glucose.: 212 mg/dL (2021 08:35)  POCT Blood Glucose.: 224 mg/dL (2021 01:28)      PHYSICAL EXAM:  GENERAL: Alert, awake, oriented x3   HEENT: FOREST, EOMI, neck supple, no JVP, no carotid bruits, no palpable thyromegaly or lymphadenopathy, no carotid bruits  CHEST/LUNG: Bilateral clear breath sounds, no rales, no crepitations, no wheezing  HEART: Regular rate and rhythm, no murmur, no gallops, no rub   ABDOMEN: Soft, nontender, non distended, bowel sounds present, no palpable organomegaly, no guarding, no rigidity, no rebound tenderness.  : No flank or supra pubic tenderness.  EXTREMITIES: Peripheral pulses are palpable, no pedal edema, no calf tenderness  Musculoskeletal: No joint or spinal tenderness  Neurology: AAOx3, no focal neurological deficit, Motor and sensory systems are intact.  SKIN: No rash or skin lesion   ACCESS: LUE AVF w/bruit and thrill     LABS:      129<L>  |  96  |  51<H>  ----------------------------<  201<H>  4.4   |  18<L>  |  4.80<H>    Ca    8.6      2021 15:09  Phos  3.9       Mg     1.5         TPro  5.9<L>  /  Alb  2.7<L>  /  TBili  0.5  /  DBili      /  AST  342<H>  /  ALT  157<H>  /  AlkPhos  60      Creatinine Trend: 4.80 <--, 4.20 <--, 3.76 <--, 3.19 <--                        11.7   15.51 )-----------( 149      ( 2021 06:47 )             36.5       Urine Studies:  Urinalysis Basic - ( 2021 15:19 )    Color: Yellow / Appearance: Clear / S.025 / pH:   Gluc:  / Ketone: Trace mg/dL  / Bili: Small / Urobili: 0.2 E.U./dL   Blood:  / Protein: >=300 mg/dL / Nitrite: POSITIVE   Leuk Esterase: Large / RBC: Many /HPF / WBC Many /HPF   Sq Epi:  / Non Sq Epi: 0-5 /HPF / Bacteria: Present /HPF      Creatinine, Random Urine: 89 mg/dL ( @ 10:51)  Sodium, Random Urine: 80 mmol/L ( @ 10:51)        21 @ 07:21 @ 07:00  --------------------------------------------------------  IN: 2350 mL / OUT: 0 mL / NET: 2350 mL    21 @ 07:21 @ 16:38  --------------------------------------------------------  IN: 0 mL / OUT: 50 mL / NET: -50 mL        RADIOLOGY & ADDITIONAL STUDIES:    EKG findings reviewed.    Imaging Personally Reviewed:  [x] YES  [ ] NO    Consultant(s) and primary physician Notes Reviewed:  [x] YES  [ ] NO    Care Discussed with Primary team/ Consultants/Other Providers [x] YES  [ ] NO    Assessment/Plan:         Thank you for the opportunity to participate in the care of your patient. The nephrology service remains available to assist with any questions or concerns. Please feel free to reach us by paging the on-call nephrology fellow for urgent issues or as below.     Tania Redd M.D.   PGY-4  Pager: [phone] Patient is a 70y old  Female who presents with a chief complaint of rhabdomyolysis/ FACUNDO/ UTI (2021 16:10)    HPI:  70F PMH Asthma (Dx after  attack) HTN, DM, HLD, L hip arthritis, Bipolar Disorder presented for evaluation of generalized weakness, malaise, urinary symptoms and mechanical fall - down for 8 hours. Patient reports that she has had urinary frequency for the last 2 days and has been feeling general malaise and fatigue. She has been laying in bed for then last 2 days and had fell out of bed yesterday and stayed on the floor for 8hours. She reports that she hit her L knee and L elbow and was too weak to get up which prompted her to call EMS. Has poor functional capacity, unable to walk more than 2 blocks without feeling shortness of breath.     Was being managed for rhabdo with IVF. S/p 2L saline in ER and was on 200cc/hr since 6pm yesterday. Received ~6L and voided 35cc. Volume overloaded    PAST MEDICAL & SURGICAL HISTORY:  HLD (hyperlipidemia)    Bipolar disorder    Arthritis of left hip    Asthma    Diabetes mellitus    HTN (hypertension)    No significant past surgical history        Allergies:  No Known Allergies      Home Medications:   albuterol/ipratropium for Nebulization 3 milliLiter(s) Nebulizer every 4 hours  ARIPiprazole 20 milliGRAM(s) Oral daily  cefTRIAXone   IVPB 2000 milliGRAM(s) IV Intermittent every 24 hours  dextrose 40% Gel 15 Gram(s) Oral once  dextrose 5%. 1000 milliLiter(s) IV Continuous <Continuous>  dextrose 50% Injectable 25 Gram(s) IV Push once  glucagon  Injectable 1 milliGRAM(s) IntraMuscular once  heparin   Injectable 7500 Unit(s) SubCutaneous every 8 hours  insulin lispro (ADMELOG) corrective regimen sliding scale   SubCutaneous Before meals and at bedtime      Hospital Medications:   MEDICATIONS  (STANDING):  albuterol/ipratropium for Nebulization 3 milliLiter(s) Nebulizer every 4 hours  ARIPiprazole 20 milliGRAM(s) Oral daily  cefTRIAXone   IVPB 2000 milliGRAM(s) IV Intermittent every 24 hours  dextrose 40% Gel 15 Gram(s) Oral once  dextrose 5%. 1000 milliLiter(s) (50 mL/Hr) IV Continuous <Continuous>  dextrose 50% Injectable 25 Gram(s) IV Push once  glucagon  Injectable 1 milliGRAM(s) IntraMuscular once  heparin   Injectable 7500 Unit(s) SubCutaneous every 8 hours  insulin lispro (ADMELOG) corrective regimen sliding scale   SubCutaneous Before meals and at bedtime      SOCIAL HISTORY:  Denies ETOh, Smoking,     Family History:  FAMILY HISTORY:  No pertinent family history in first degree relatives    ROS:  RESPIRATORY: +shortness of breath  CARDIOVASCULAR: No Chest pain  MUSCULOSKELETAL: No leg swelling    VITALS:  T(F): 97.7 (21 @ 13:08), Max: 102.4 (21 @ 17:05)  HR: 91 (21 @ 13:08)  BP: 163/78 (21 @ 13:08)  RR: 17 (21 @ 13:08)  SpO2: 94% (21 @ 13:08)  Wt(kg): --     @ 07:  -   @ 07:00  --------------------------------------------------------  IN: 2350 mL / OUT: 0 mL / NET: 2350 mL     @ 07:  -   @ 16:38  --------------------------------------------------------  IN: 0 mL / OUT: 50 mL / NET: -50 mL        Weight (kg): 91 ( @ 17:45)  CAPILLARY BLOOD GLUCOSE    POCT Blood Glucose.: 202 mg/dL (2021 11:50)  POCT Blood Glucose.: 212 mg/dL (2021 08:35)  POCT Blood Glucose.: 224 mg/dL (2021 01:28)    PHYSICAL EXAM:  GENERAL: Alert, awake, oriented x3 on NC 2L  CHEST/LUNG: Bilateral coarse breath sounds  HEART: Regular rate and rhythm, no murmur  ABDOMEN: Soft, nontender, non distended, obese  : No supra pubic tenderness, santillan in place  EXTREMITIES: no pitting oedema  ACCESS: RIJ temp cath placed     LABS:      129<L>  |  96  |  51<H>  ----------------------------<  201<H>  4.4   |  18<L>  |  4.80<H>    Ca    8.6      2021 15:09  Phos  3.9       Mg     1.5         TPro  5.9<L>  /  Alb  2.7<L>  /  TBili  0.5  /  DBili      /  AST  342<H>  /  ALT  157<H>  /  AlkPhos  60      Creatinine Trend: 4.80 <--, 4.20 <--, 3.76 <--, 3.19 <--                        11.7   15.51 )-----------( 149      ( 2021 06:47 )             36.5       Urine Studies:  Urinalysis Basic - ( 2021 15:19 )    Color: Yellow / Appearance: Clear / S.025 / pH:   Gluc:  / Ketone: Trace mg/dL  / Bili: Small / Urobili: 0.2 E.U./dL   Blood:  / Protein: >=300 mg/dL / Nitrite: POSITIVE   Leuk Esterase: Large / RBC: Many /HPF / WBC Many /HPF   Sq Epi:  / Non Sq Epi: 0-5 /HPF / Bacteria: Present /HPF      Creatinine, Random Urine: 89 mg/dL ( @ 10:51)  Sodium, Random Urine: 80 mmol/L ( @ 10:51)        21 @ 07:  -  21 @ 07:00  --------------------------------------------------------  IN: 2350 mL / OUT: 0 mL / NET: 2350 mL    21 @ 07:  -  21 @ 16:38  --------------------------------------------------------  IN: 0 mL / OUT: 50 mL / NET: -50 mL        RADIOLOGY & ADDITIONAL STUDIES:    EKG findings reviewed.    Imaging Personally Reviewed:  [x] YES  [ ] NO    Consultant(s) and primary physician Notes Reviewed:  [x] YES  [ ] NO    Care Discussed with Primary team/ Consultants/Other Providers [x] YES  [ ] NO    Assessment/Plan:   70F PMH Asthma (Dx after  attack) HTN, DM, HLD, L hip arthritis, Bipolar Disorder presented for evaluation of generalized weakness, malaise, urinary symptoms and mechanical fall - down for 8 hours found to have severe rhabdo with anuria. Renal consulted.    Anuric FACUNDO 2/2 to rhabdomyolysis causing pigment nephropathy ATN    Baseline creat- 0.6 in 10/20  Presented in 3s trending up to 4s  CPK 96044y-> [id number], continue trending CPK  Anuric, FeNa suggesting ATN    HD today for volume overload  Get renal/bladder sono to r/o obstruction- Right kidney normal echogenicity  Continue santillan    Electrolytes management with HD  BP: hypertensive, UF with HD  Hgb at goal  HAGMA from renal failure  BMD: wnl    Daily weights, strict I&Os, renally dose meds, renal diet, avoid nephrotoxic meds.    Thank you for the opportunity to participate in the care of your patient. The nephrology service remains available to assist with any questions or concerns. Please feel free to reach us by paging the on-call nephrology fellow for urgent issues or as below.     Tania Redd M.D.   PGY-4  Pager: [phone]

## 2021-01-25 NOTE — PROGRESS NOTE ADULT - PROBLEM SELECTOR PLAN 9
Reports starting Lasix for LE edema. No Dx of CHF.  - Holding in setting of Rhabdo and poor volume statis  - Echocardiogram  - Consder Cardiology consult based on Echo results

## 2021-01-25 NOTE — CONSULT NOTE ADULT - ATTENDING COMMENTS
pt reports that she noted a drop in urine output for past 5 days  and only on floor for 8 hours prior to admission  high concern for rhabdomyolysis, however u/a with protein and blood (and RBCs)- [facility]  to arrange for HD today
Agree w above. No dyspnea, hypoxia or wheeze now. Would just use bronchodilators as/if needed.

## 2021-01-26 LAB
-  AMIKACIN: SIGNIFICANT CHANGE UP
-  AMOXICILLIN/CLAVULANIC ACID: SIGNIFICANT CHANGE UP
-  AMPICILLIN/SULBACTAM: SIGNIFICANT CHANGE UP
-  AMPICILLIN: SIGNIFICANT CHANGE UP
-  AZTREONAM: SIGNIFICANT CHANGE UP
-  CEFAZOLIN: SIGNIFICANT CHANGE UP
-  CEFEPIME: SIGNIFICANT CHANGE UP
-  CEFOXITIN: SIGNIFICANT CHANGE UP
-  CEFTRIAXONE: SIGNIFICANT CHANGE UP
-  CIPROFLOXACIN: SIGNIFICANT CHANGE UP
-  ERTAPENEM: SIGNIFICANT CHANGE UP
-  GENTAMICIN: SIGNIFICANT CHANGE UP
-  IMIPENEM: SIGNIFICANT CHANGE UP
-  LEVOFLOXACIN: SIGNIFICANT CHANGE UP
-  MEROPENEM: SIGNIFICANT CHANGE UP
-  NITROFURANTOIN: SIGNIFICANT CHANGE UP
-  PIPERACILLIN/TAZOBACTAM: SIGNIFICANT CHANGE UP
-  TIGECYCLINE: SIGNIFICANT CHANGE UP
-  TOBRAMYCIN: SIGNIFICANT CHANGE UP
-  TRIMETHOPRIM/SULFAMETHOXAZOLE: SIGNIFICANT CHANGE UP
ALBUMIN SERPL ELPH-MCNC: 2.3 G/DL — LOW (ref 3.3–5)
ALP SERPL-CCNC: 61 U/L — SIGNIFICANT CHANGE UP (ref 40–120)
ALT FLD-CCNC: 117 U/L — HIGH (ref 10–45)
ANION GAP SERPL CALC-SCNC: 17 MMOL/L — SIGNIFICANT CHANGE UP (ref 5–17)
APPEARANCE UR: CLEAR — SIGNIFICANT CHANGE UP
AST SERPL-CCNC: 176 U/L — HIGH (ref 10–40)
BACTERIA # UR AUTO: PRESENT /HPF
BASOPHILS # BLD AUTO: 0.02 K/UL — SIGNIFICANT CHANGE UP (ref 0–0.2)
BASOPHILS NFR BLD AUTO: 0.1 % — SIGNIFICANT CHANGE UP (ref 0–2)
BILIRUB SERPL-MCNC: 0.3 MG/DL — SIGNIFICANT CHANGE UP (ref 0.2–1.2)
BILIRUB UR-MCNC: ABNORMAL
BUN SERPL-MCNC: 45 MG/DL — HIGH (ref 7–23)
CALCIUM SERPL-MCNC: 8.4 MG/DL — SIGNIFICANT CHANGE UP (ref 8.4–10.5)
CHLORIDE SERPL-SCNC: 97 MMOL/L — SIGNIFICANT CHANGE UP (ref 96–108)
CK SERPL-CCNC: 4916 U/L — HIGH (ref 25–170)
CO2 SERPL-SCNC: 18 MMOL/L — LOW (ref 22–31)
COLOR SPEC: YELLOW — SIGNIFICANT CHANGE UP
CREAT ?TM UR-MCNC: 137 MG/DL — SIGNIFICANT CHANGE UP
CREAT SERPL-MCNC: 4.64 MG/DL — HIGH (ref 0.5–1.3)
CULTURE RESULTS: SIGNIFICANT CHANGE UP
DIFF PNL FLD: ABNORMAL
EOSINOPHIL # BLD AUTO: 0.01 K/UL — SIGNIFICANT CHANGE UP (ref 0–0.5)
EOSINOPHIL NFR BLD AUTO: 0.1 % — SIGNIFICANT CHANGE UP (ref 0–6)
EPI CELLS # UR: SIGNIFICANT CHANGE UP /HPF (ref 0–5)
GLUCOSE BLDC GLUCOMTR-MCNC: 142 MG/DL — HIGH (ref 70–99)
GLUCOSE BLDC GLUCOMTR-MCNC: 159 MG/DL — HIGH (ref 70–99)
GLUCOSE BLDC GLUCOMTR-MCNC: 181 MG/DL — HIGH (ref 70–99)
GLUCOSE BLDC GLUCOMTR-MCNC: 201 MG/DL — HIGH (ref 70–99)
GLUCOSE SERPL-MCNC: 154 MG/DL — HIGH (ref 70–99)
GLUCOSE UR QL: NEGATIVE — SIGNIFICANT CHANGE UP
HCT VFR BLD CALC: 30.4 % — LOW (ref 34.5–45)
HGB BLD-MCNC: 10.1 G/DL — LOW (ref 11.5–15.5)
IMM GRANULOCYTES NFR BLD AUTO: 1 % — SIGNIFICANT CHANGE UP (ref 0–1.5)
KETONES UR-MCNC: NEGATIVE — SIGNIFICANT CHANGE UP
LEUKOCYTE ESTERASE UR-ACNC: ABNORMAL
LYMPHOCYTES # BLD AUTO: 0.78 K/UL — LOW (ref 1–3.3)
LYMPHOCYTES # BLD AUTO: 5.4 % — LOW (ref 13–44)
MAGNESIUM SERPL-MCNC: 1.9 MG/DL — SIGNIFICANT CHANGE UP (ref 1.6–2.6)
MCHC RBC-ENTMCNC: 29 PG — SIGNIFICANT CHANGE UP (ref 27–34)
MCHC RBC-ENTMCNC: 33.2 GM/DL — SIGNIFICANT CHANGE UP (ref 32–36)
MCV RBC AUTO: 87.4 FL — SIGNIFICANT CHANGE UP (ref 80–100)
METHOD TYPE: SIGNIFICANT CHANGE UP
MONOCYTES # BLD AUTO: 1.45 K/UL — HIGH (ref 0–0.9)
MONOCYTES NFR BLD AUTO: 10.1 % — SIGNIFICANT CHANGE UP (ref 2–14)
NEUTROPHILS # BLD AUTO: 11.95 K/UL — HIGH (ref 1.8–7.4)
NEUTROPHILS NFR BLD AUTO: 83.3 % — HIGH (ref 43–77)
NITRITE UR-MCNC: NEGATIVE — SIGNIFICANT CHANGE UP
NRBC # BLD: 0 /100 WBCS — SIGNIFICANT CHANGE UP (ref 0–0)
ORGANISM # SPEC MICROSCOPIC CNT: SIGNIFICANT CHANGE UP
ORGANISM # SPEC MICROSCOPIC CNT: SIGNIFICANT CHANGE UP
OSMOLALITY UR: 276 MOSM/KG — LOW (ref 300–900)
PH UR: 5.5 — SIGNIFICANT CHANGE UP (ref 5–8)
PHOSPHATE SERPL-MCNC: 5.2 MG/DL — HIGH (ref 2.5–4.5)
PLATELET # BLD AUTO: 142 K/UL — LOW (ref 150–400)
POTASSIUM SERPL-MCNC: 4.3 MMOL/L — SIGNIFICANT CHANGE UP (ref 3.5–5.3)
POTASSIUM SERPL-SCNC: 4.3 MMOL/L — SIGNIFICANT CHANGE UP (ref 3.5–5.3)
PROT ?TM UR-MCNC: 448 MG/DL — HIGH (ref 0–12)
PROT SERPL-MCNC: 5.6 G/DL — LOW (ref 6–8.3)
PROT UR-MCNC: 100 MG/DL
PROT/CREAT UR-RTO: 1.6 RATIO — HIGH (ref 0–0.2)
RBC # BLD: 3.48 M/UL — LOW (ref 3.8–5.2)
RBC # FLD: 13.2 % — SIGNIFICANT CHANGE UP (ref 10.3–14.5)
RBC CASTS # UR COMP ASSIST: < 5 /HPF — SIGNIFICANT CHANGE UP
SODIUM SERPL-SCNC: 132 MMOL/L — LOW (ref 135–145)
SODIUM UR-SCNC: 53 MMOL/L — SIGNIFICANT CHANGE UP
SP GR SPEC: 1.02 — SIGNIFICANT CHANGE UP (ref 1–1.03)
SPECIMEN SOURCE: SIGNIFICANT CHANGE UP
UROBILINOGEN FLD QL: 0.2 E.U./DL — SIGNIFICANT CHANGE UP
UUN UR-MCNC: 151 MG/DL — SIGNIFICANT CHANGE UP
WBC # BLD: 14.36 K/UL — HIGH (ref 3.8–10.5)
WBC # FLD AUTO: 14.36 K/UL — HIGH (ref 3.8–10.5)
WBC UR QL: ABNORMAL /HPF

## 2021-01-26 PROCEDURE — 99232 SBSQ HOSP IP/OBS MODERATE 35: CPT

## 2021-01-26 PROCEDURE — 76770 US EXAM ABDO BACK WALL COMP: CPT | Mod: 26

## 2021-01-26 PROCEDURE — 99233 SBSQ HOSP IP/OBS HIGH 50: CPT | Mod: GC

## 2021-01-26 RX ORDER — SODIUM BICARBONATE 1 MEQ/ML
650 SYRINGE (ML) INTRAVENOUS EVERY 12 HOURS
Refills: 0 | Status: DISCONTINUED | OUTPATIENT
Start: 2021-01-26 | End: 2021-01-29

## 2021-01-26 RX ORDER — PANTOPRAZOLE SODIUM 20 MG/1
40 TABLET, DELAYED RELEASE ORAL
Refills: 0 | Status: DISCONTINUED | OUTPATIENT
Start: 2021-01-26 | End: 2021-01-29

## 2021-01-26 RX ORDER — SIMETHICONE 80 MG/1
80 TABLET, CHEWABLE ORAL DAILY
Refills: 0 | Status: DISCONTINUED | OUTPATIENT
Start: 2021-01-26 | End: 2021-01-29

## 2021-01-26 RX ADMIN — Medication 3 MILLILITER(S): at 17:07

## 2021-01-26 RX ADMIN — ARIPIPRAZOLE 20 MILLIGRAM(S): 15 TABLET ORAL at 11:12

## 2021-01-26 RX ADMIN — Medication 4: at 12:40

## 2021-01-26 RX ADMIN — Medication 3 MILLILITER(S): at 14:44

## 2021-01-26 RX ADMIN — SIMETHICONE 80 MILLIGRAM(S): 80 TABLET, CHEWABLE ORAL at 17:07

## 2021-01-26 RX ADMIN — Medication 3 MILLILITER(S): at 07:37

## 2021-01-26 RX ADMIN — HEPARIN SODIUM 7500 UNIT(S): 5000 INJECTION INTRAVENOUS; SUBCUTANEOUS at 14:44

## 2021-01-26 RX ADMIN — CEFTRIAXONE 100 MILLIGRAM(S): 500 INJECTION, POWDER, FOR SOLUTION INTRAMUSCULAR; INTRAVENOUS at 14:45

## 2021-01-26 RX ADMIN — HEPARIN SODIUM 7500 UNIT(S): 5000 INJECTION INTRAVENOUS; SUBCUTANEOUS at 21:56

## 2021-01-26 RX ADMIN — Medication 2: at 21:54

## 2021-01-26 RX ADMIN — Medication 3 MILLILITER(S): at 21:57

## 2021-01-26 RX ADMIN — Medication 2: at 17:55

## 2021-01-26 RX ADMIN — Medication 3 MILLILITER(S): at 10:46

## 2021-01-26 RX ADMIN — Medication 650 MILLIGRAM(S): at 17:08

## 2021-01-26 RX ADMIN — HEPARIN SODIUM 7500 UNIT(S): 5000 INJECTION INTRAVENOUS; SUBCUTANEOUS at 07:36

## 2021-01-26 NOTE — PROGRESS NOTE ADULT - SUBJECTIVE AND OBJECTIVE BOX
Patient is a 70y Female seen and evaluated at bedside. S/p HDx1 yesterday for volume overload. UF 1.5L. Comfortable in appearance today, on RA. No need for HD. CPK <5000. Repeat UA more consistent with rhabdo and UTI.    Meds:    albuterol/ipratropium for Nebulization 3 every 4 hours  ARIPiprazole 20 daily  cefTRIAXone   IVPB 2000 every 24 hours  dextrose 40% Gel 15 once  dextrose 5%. 1000 <Continuous>  dextrose 50% Injectable 25 once  glucagon  Injectable 1 once  heparin   Injectable 7500 every 8 hours  insulin lispro (ADMELOG) corrective regimen sliding scale  Before meals and at bedtime  pantoprazole    Tablet 40 before breakfast      T(C): , Max: 38.2 (21 @ 18:00)  T(F): , Max: 100.8 (21 @ 18:00)  HR: 79 (21 @ 13:41)  BP: 123/73 (21 @ 13:41)  BP(mean): --  RR: 17 (21 @ 13:41)  SpO2: 94% (21 @ 13:41)  Wt(kg): --     @ 07:01  -   @ 07:00  --------------------------------------------------------  IN: 0 mL / OUT: 1550 mL / NET: -1550 mL          Review of Systems:  RESPIRATORY: No shortness of breath  CARDIOVASCULAR: No Chest pain  MUSCULOSKELETAL: No leg swelling      PHYSICAL EXAM:  NEGENERAL: Alert, awake, oriented x3 on RA, comfortable  CHEST/LUNG: Bilaterally CTA  HEART: Regular rate and rhythm, no murmur  ABDOMEN: Soft, nontender, non distended, obese  : santillan in place  EXTREMITIES: no pitting oedema  ACCESS: [facility] temp cath placed     LABS:                        10.1   14.36 )-----------( 142      ( 2021 09:13 )             30.4         132<L>  |  97  |  45<H>  ----------------------------<  154<H>  4.3   |  18<L>  |  4.64<H>    Ca    8.4      2021 09:13  Phos  5.2       Mg     1.9         TPro  5.6<L>  /  Alb  2.3<L>  /  TBili  0.3  /  DBili  x   /  AST  176<H>  /  ALT  117<H>  /  AlkPhos  61      Hepatitis B Surface Antibody: Nonreact ( @ 18:05)      Urinalysis Basic - ( 2021 11:35 )    Color: Yellow / Appearance: Clear / S.025 / pH: x  Gluc: x / Ketone: NEGATIVE  / Bili: Small / Urobili: 0.2 E.U./dL   Blood: x / Protein: 100 mg/dL / Nitrite: NEGATIVE   Leuk Esterase: Moderate / RBC: < 5 /HPF / WBC Many /HPF   Sq Epi: x / Non Sq Epi: 0-5 /HPF / Bacteria: Present /HPF      Creatinine, Random Urine: 137 mg/dL ( @ 11:35)  Protein/Creatinine Ratio Calculation: 1.6 Ratio ( @ 11:35)  Sodium, Random Urine: 53 mmol/L ( @ 11:35)  Osmolality, Random Urine: 276 mosm/kg ( @ 11:35)  Creatinine, Random Urine: 89 mg/dL ( @ 10:51)  Sodium, Random Urine: 80 mmol/L ( @ 10:51)        RADIOLOGY & ADDITIONAL STUDIES:

## 2021-01-26 NOTE — PROGRESS NOTE ADULT - PROBLEM SELECTOR PLAN 1
Patient noted to have increased with +UA  leukocytosis and blood cultures growing E. Coli.  -CTX to 2g till Feb 2   -Patient not requiring fluids Patient noted to have increased with +UA  leukocytosis and blood cultures growing E. Coli.  -CTX to 2g till Feb 2     #ATN   FeNa suggesting ATN likely secondary to sepsis   -Dialysis   -Avoid nephrotoxic agents     #Hagma 2/2 to Renal failure   -HD on 1/25

## 2021-01-26 NOTE — PROGRESS NOTE ADULT - PROBLEM SELECTOR PLAN 3
CK 18K with transaminitis and FACUNDO after being down for 8 hours now downtrending  - c/w IVF NS 200cc/hr   - Monitor EKG  - Trend CMP (LFTs Cr and CPK) q6hrs    #Transaminitis  US showed hepatomegaly and cholelithiasis likely in setting of hepatosteatosis  - Trend LFTs  - Holding statin and ACE CK 18K with transaminitis and FACUNDO after being down for 8 hours now downtrending  - Status post IVF NS 200cc/hr   - Monitor EKG  - Trend CMP (LFTs Cr and CPK) q6hrs    #Transaminitis  US showed hepatomegaly and cholelithiasis likely in setting of hepatosteatosis  - Trend LFTs  - Holding statin and ACE

## 2021-01-26 NOTE — PROGRESS NOTE ADULT - SUBJECTIVE AND OBJECTIVE BOX
O/N Events: OSIRIS   Subjective/ROS: Denies HA, CP, SOB, n/v, changes in bowel/urinary habits.  12pt ROS otherwise negative.    VITALS  Vital Signs Last 24 Hrs  T(C): 37.2 (2021 13:41), Max: 38.2 (2021 18:00)  T(F): 98.9 (2021 13:41), Max: 100.8 (2021 18:00)  HR: 79 (2021 13:41) (79 - 94)  BP: 123/73 (2021 13:41) (104/57 - 123/73)  BP(mean): --  RR: 17 (2021 13:41) (17 - 19)  SpO2: 94% (2021 13:41) (94% - 97%)    CAPILLARY BLOOD GLUCOSE      POCT Blood Glucose.: 201 mg/dL (2021 12:11)  POCT Blood Glucose.: 142 mg/dL (2021 08:35)  POCT Blood Glucose.: 161 mg/dL (2021 22:24)      PHYSICAL EXAM  General: A&Ox3; NAD  Head: NC/AT; MMM; PERRL; EOMI;  Neck: Supple; no JVD  Respiratory: CTA B/L; no wheezes/crackles   Cardiovascular: Regular rhythm/rate; S1/S2   Gastrointestinal: Soft; NTND; normoactive BS  Extremities: WWP; no edema/cyanosis  Neurological:  CNII-XII grossly intact; no obvious focal deficits    MEDICATIONS  (STANDING):  albuterol/ipratropium for Nebulization 3 milliLiter(s) Nebulizer every 4 hours  ARIPiprazole 20 milliGRAM(s) Oral daily  cefTRIAXone   IVPB 2000 milliGRAM(s) IV Intermittent every 24 hours  dextrose 40% Gel 15 Gram(s) Oral once  dextrose 5%. 1000 milliLiter(s) (50 mL/Hr) IV Continuous <Continuous>  dextrose 50% Injectable 25 Gram(s) IV Push once  glucagon  Injectable 1 milliGRAM(s) IntraMuscular once  heparin   Injectable 7500 Unit(s) SubCutaneous every 8 hours  insulin lispro (ADMELOG) corrective regimen sliding scale   SubCutaneous Before meals and at bedtime    MEDICATIONS  (PRN):      No Known Allergies      LABS                        10.1   14.36 )-----------( 142      ( 2021 09:13 )             30.4         132<L>  |  97  |  45<H>  ----------------------------<  154<H>  4.3   |  18<L>  |  4.64<H>    Ca    8.4      2021 09:13  Phos  5.2       Mg     1.9         TPro  5.6<L>  /  Alb  2.3<L>  /  TBili  0.3  /  DBili  x   /  AST  176<H>  /  ALT  117<H>  /  AlkPhos  61        Urinalysis Basic - ( 2021 11:35 )    Color: Yellow / Appearance: Clear / S.025 / pH: x  Gluc: x / Ketone: NEGATIVE  / Bili: Small / Urobili: 0.2 E.U./dL   Blood: x / Protein: 100 mg/dL / Nitrite: NEGATIVE   Leuk Esterase: Moderate / RBC: < 5 /HPF / WBC Many /HPF   Sq Epi: x / Non Sq Epi: 0-5 /HPF / Bacteria: Present /HPF      CARDIAC MARKERS ( 2021 09:13 )  x     / x     / 4916 U/L / x     / x      CARDIAC MARKERS ( 2021 06:47 )  x     / x     / 95926 U/L / x     / x      CARDIAC MARKERS ( 2021 22:58 )  x     / x     / 19320 U/L / x     / x      CARDIAC MARKERS ( 2021 14:08 )  x     / x     / 97174 U/L / x     / x

## 2021-01-26 NOTE — PROGRESS NOTE ADULT - SUBJECTIVE AND OBJECTIVE BOX
Physical Medicine and Rehabilitation Progress Note:    Patient is a 70y old  Female who presents with a chief complaint of rhabdomyolysis/ FACUNDO/ UTI (26 Jan 2021 14:45)      HPI:  70F PMH Asthma (Dx after 9/11 attack) HTN, DM, HLD, L hip arthritis, Bipolar Disorder presented for evaluation of generalized weakness, malaise, urinary symptoms and mechanical fall - down for 8 hours. Patient reports that she has had urinary frequency for the last 2 days and has been feeling general malaise and fatigue. She has been laying in bed for then last 2 days and had fell out of bed yesterday and stayed on the floor for 8hours. She reports that she hit her L knee and L elbow and was too weak to get up which prompted her to call EMS. Denies fevers, nausea, vomiting, chest pain, abdominal pain, changes in mental status, hematuria, constipation. Endorses that she has a R hand intention tremor that developed several months ago, as well as a poor functional capacity, unable to walk more than 2 blocks without feeling shortness of breath.     In the ED VS T98.3 -102.4 BP 92/61 HR 95 R16 94% on RA  Labs: WBC 22 | Hg/Hct  13/40 | Plt 197 |   Na 132 K 4.6 | BUN/ Cr 39/3.19 | TP/Alb 7.5/2.8 | AST/ALT /22/ 66 |   Lactate 3.1 > 3.1 | COVID negative  UA +   Imaging: US Abd - Hepatomegaly and hepatic steatosis. Cholelithiasis without evidence of acute cholecystitis. No biliary ductal dilatation.  Xray Hip and L knee: No evidence of acute fracture or dislocation  CXR: No acute infiltrates     ED Course: Tylenol 650mg, Ceftriaxone 1g , TDaop, 2L NS and started on 200cc/hr   (24 Jan 2021 21:22)                            10.1   14.36 )-----------( 142      ( 26 Jan 2021 09:13 )             30.4       01-26    132<L>  |  97  |  45<H>  ----------------------------<  154<H>  4.3   |  18<L>  |  4.64<H>    Ca    8.4      26 Jan 2021 09:13  Phos  5.2     01-26  Mg     1.9     01-26    TPro  5.6<L>  /  Alb  2.3<L>  /  TBili  0.3  /  DBili  x   /  AST  176<H>  /  ALT  117<H>  /  AlkPhos  61  01-26    Vital Signs Last 24 Hrs  T(C): 37.2 (26 Jan 2021 13:41), Max: 38.2 (25 Jan 2021 18:00)  T(F): 98.9 (26 Jan 2021 13:41), Max: 100.8 (25 Jan 2021 18:00)  HR: 79 (26 Jan 2021 13:41) (79 - 94)  BP: 123/73 (26 Jan 2021 13:41) (104/57 - 123/73)  BP(mean): --  RR: 17 (26 Jan 2021 13:41) (17 - 19)  SpO2: 94% (26 Jan 2021 13:41) (94% - 97%)    MEDICATIONS  (STANDING):  albuterol/ipratropium for Nebulization 3 milliLiter(s) Nebulizer every 4 hours  ARIPiprazole 20 milliGRAM(s) Oral daily  cefTRIAXone   IVPB 2000 milliGRAM(s) IV Intermittent every 24 hours  dextrose 40% Gel 15 Gram(s) Oral once  dextrose 5%. 1000 milliLiter(s) (50 mL/Hr) IV Continuous <Continuous>  dextrose 50% Injectable 25 Gram(s) IV Push once  glucagon  Injectable 1 milliGRAM(s) IntraMuscular once  heparin   Injectable 7500 Unit(s) SubCutaneous every 8 hours  insulin lispro (ADMELOG) corrective regimen sliding scale   SubCutaneous Before meals and at bedtime  pantoprazole    Tablet 40 milliGRAM(s) Oral before breakfast    MEDICATIONS  (PRN):    Currently Undergoing Physical/ Occupational Therapy at bedside.    Functional Status Assessment:   1/25/2021    Previous Level of Function:     · Additional Comments	Pt reports living with her mother, has elevator access (no GREGG), and uses a walker at home. Pt was able to tend to her own ADLs and assist her mother.    Cognitive Status Examination:   · Orientation	oriented to person, place, time and situation  · Level of Consciousness	alert  · Follows Commands and Answers Questions	100% of the time  · Personal Safety and Judgment	intact    Manual Muscle Testing:   · Manual Muscle Testing Results	b/l hip flexion and knee extension grossly 3/5    Bed Mobility: Rolling/Turning:     · Level of [place]	maximum assist (25% patients effort)  · Physical Assist/Nonphysical Assist	1 person assist; nonverbal cues (demo/gestures); verbal cues    Bed Mobility: Scooting/Bridging:     · Level of [place]	maximum assist (25% patients effort)  · Physical Assist/Nonphysical Assist	1 person assist; nonverbal cues (demo/gestures); verbal cues    Bed Mobility: Sit to Supine:     · Level of [place]	maximum assist (25% patients effort)  · Physical Assist/Nonphysical Assist	1 person assist; nonverbal cues (demo/gestures); verbal cues    Bed Mobility: Supine to Sit:     · Level of [place]	maximum assist (25% patients effort)  · Physical Assist/Nonphysical Assist	1 person assist; nonverbal cues (demo/gestures); verbal cues    Bed Mobility Analysis:     · Bed Mobility Limitations	decreased ability to use arms for pushing/pulling; decreased ability to use legs for bridging/pushing; impaired ability to control trunk for mobility  · Impairments Contributing to Impaired Bed Mobility	impaired balance; decreased strength; impaired postural control    Transfer: Sit to Stand:     · Level of [place]	unable to perform; Pt expressed dizziness and FOF. Not safe to attempt STS with 1 person assist at this time.    Sit/Stand Transfer Safety Analysis:     · Transfer Safety Concerns Noted	Pt expressed dizziness and FOF. Not safe to attempt STS with 1 person assist at this time.    Gait Skills:     · Level of [place]	unable to perform    Balance Skills Assessment:     · Sitting Balance: Static	poor balance  · Sitting Balance: Dynamic	poor balance  · Sit-to-Stand Balance	Pt expressed dizziness and FOF. Not safe to attempt STS with 1 person assist at this time.  · Systems Impairment Contributing to Balance Disturbance	musculoskeletal  · Identified Impairments Contributing to Balance Disturbance	decreased strength; impaired postural control    Clinical Impressions:   · Criteria for Skilled Therapeutic Interventions	impairments found; functional limitations in following categories; risk reduction/prevention  · Impairments Found (describe specific impairments)	gait, locomotion, and balance; ergonomics and body mechanics; muscle strength; posture  · Functional Limitations in Following Categories (describe specific limitations)	self-care; home management; community/leisure  · Risk Reduction/Prevention (Describe Specific Areas of risk reduction/prevention)	risk factors  · Risk Areas	fall  · Rehab Potential	good, to achieve stated therapy goals  · Therapy Frequency	2-3x/week        PM&R Impression: as above    Current Disposition Plan Recommendations:    subacute rehab placement

## 2021-01-27 DIAGNOSIS — N17.9 ACUTE KIDNEY FAILURE, UNSPECIFIED: ICD-10-CM

## 2021-01-27 LAB
-  AMIKACIN: SIGNIFICANT CHANGE UP
-  AMPICILLIN/SULBACTAM: SIGNIFICANT CHANGE UP
-  AMPICILLIN: SIGNIFICANT CHANGE UP
-  AZTREONAM: SIGNIFICANT CHANGE UP
-  CEFAZOLIN: SIGNIFICANT CHANGE UP
-  CEFEPIME: SIGNIFICANT CHANGE UP
-  CEFOXITIN: SIGNIFICANT CHANGE UP
-  CEFTRIAXONE: SIGNIFICANT CHANGE UP
-  CIPROFLOXACIN: SIGNIFICANT CHANGE UP
-  ERTAPENEM: SIGNIFICANT CHANGE UP
-  GENTAMICIN: SIGNIFICANT CHANGE UP
-  IMIPENEM: SIGNIFICANT CHANGE UP
-  LEVOFLOXACIN: SIGNIFICANT CHANGE UP
-  MEROPENEM: SIGNIFICANT CHANGE UP
-  PIPERACILLIN/TAZOBACTAM: SIGNIFICANT CHANGE UP
-  TOBRAMYCIN: SIGNIFICANT CHANGE UP
-  TRIMETHOPRIM/SULFAMETHOXAZOLE: SIGNIFICANT CHANGE UP
ALBUMIN SERPL ELPH-MCNC: 2.5 G/DL — LOW (ref 3.3–5)
ALP SERPL-CCNC: 80 U/L — SIGNIFICANT CHANGE UP (ref 40–120)
ALT FLD-CCNC: 107 U/L — HIGH (ref 10–45)
ANION GAP SERPL CALC-SCNC: 18 MMOL/L — HIGH (ref 5–17)
AST SERPL-CCNC: 136 U/L — HIGH (ref 10–40)
BASOPHILS # BLD AUTO: 0.02 K/UL — SIGNIFICANT CHANGE UP (ref 0–0.2)
BASOPHILS NFR BLD AUTO: 0.2 % — SIGNIFICANT CHANGE UP (ref 0–2)
BILIRUB SERPL-MCNC: 0.2 MG/DL — SIGNIFICANT CHANGE UP (ref 0.2–1.2)
BUN SERPL-MCNC: 63 MG/DL — HIGH (ref 7–23)
CALCIUM SERPL-MCNC: 9.2 MG/DL — SIGNIFICANT CHANGE UP (ref 8.4–10.5)
CHLORIDE SERPL-SCNC: 92 MMOL/L — LOW (ref 96–108)
CK SERPL-CCNC: 2720 U/L — HIGH (ref 25–170)
CO2 SERPL-SCNC: 19 MMOL/L — LOW (ref 22–31)
CREAT SERPL-MCNC: 5.54 MG/DL — HIGH (ref 0.5–1.3)
CULTURE RESULTS: SIGNIFICANT CHANGE UP
EOSINOPHIL # BLD AUTO: 0.07 K/UL — SIGNIFICANT CHANGE UP (ref 0–0.5)
EOSINOPHIL NFR BLD AUTO: 0.5 % — SIGNIFICANT CHANGE UP (ref 0–6)
GLUCOSE BLDC GLUCOMTR-MCNC: 126 MG/DL — HIGH (ref 70–99)
GLUCOSE BLDC GLUCOMTR-MCNC: 152 MG/DL — HIGH (ref 70–99)
GLUCOSE BLDC GLUCOMTR-MCNC: 159 MG/DL — HIGH (ref 70–99)
GLUCOSE SERPL-MCNC: 130 MG/DL — HIGH (ref 70–99)
HCT VFR BLD CALC: 29.8 % — LOW (ref 34.5–45)
HGB BLD-MCNC: 10 G/DL — LOW (ref 11.5–15.5)
IMM GRANULOCYTES NFR BLD AUTO: 0.9 % — SIGNIFICANT CHANGE UP (ref 0–1.5)
LYMPHOCYTES # BLD AUTO: 0.7 K/UL — LOW (ref 1–3.3)
LYMPHOCYTES # BLD AUTO: 5.5 % — LOW (ref 13–44)
MAGNESIUM SERPL-MCNC: 2.3 MG/DL — SIGNIFICANT CHANGE UP (ref 1.6–2.6)
MCHC RBC-ENTMCNC: 29.9 PG — SIGNIFICANT CHANGE UP (ref 27–34)
MCHC RBC-ENTMCNC: 33.6 GM/DL — SIGNIFICANT CHANGE UP (ref 32–36)
MCV RBC AUTO: 89 FL — SIGNIFICANT CHANGE UP (ref 80–100)
METHOD TYPE: SIGNIFICANT CHANGE UP
MONOCYTES # BLD AUTO: 1.14 K/UL — HIGH (ref 0–0.9)
MONOCYTES NFR BLD AUTO: 8.9 % — SIGNIFICANT CHANGE UP (ref 2–14)
NEUTROPHILS # BLD AUTO: 10.7 K/UL — HIGH (ref 1.8–7.4)
NEUTROPHILS NFR BLD AUTO: 84 % — HIGH (ref 43–77)
NRBC # BLD: 0 /100 WBCS — SIGNIFICANT CHANGE UP (ref 0–0)
ORGANISM # SPEC MICROSCOPIC CNT: SIGNIFICANT CHANGE UP
PHOSPHATE SERPL-MCNC: 6.4 MG/DL — HIGH (ref 2.5–4.5)
PLATELET # BLD AUTO: 158 K/UL — SIGNIFICANT CHANGE UP (ref 150–400)
POTASSIUM SERPL-MCNC: 4 MMOL/L — SIGNIFICANT CHANGE UP (ref 3.5–5.3)
POTASSIUM SERPL-SCNC: 4 MMOL/L — SIGNIFICANT CHANGE UP (ref 3.5–5.3)
PROT SERPL-MCNC: 5.5 G/DL — LOW (ref 6–8.3)
RBC # BLD: 3.35 M/UL — LOW (ref 3.8–5.2)
RBC # FLD: 13.1 % — SIGNIFICANT CHANGE UP (ref 10.3–14.5)
SODIUM SERPL-SCNC: 129 MMOL/L — LOW (ref 135–145)
SPECIMEN SOURCE: SIGNIFICANT CHANGE UP
WBC # BLD: 12.75 K/UL — HIGH (ref 3.8–10.5)
WBC # FLD AUTO: 12.75 K/UL — HIGH (ref 3.8–10.5)

## 2021-01-27 PROCEDURE — 99232 SBSQ HOSP IP/OBS MODERATE 35: CPT

## 2021-01-27 PROCEDURE — 99233 SBSQ HOSP IP/OBS HIGH 50: CPT | Mod: GC

## 2021-01-27 RX ORDER — CEFTRIAXONE 500 MG/1
2000 INJECTION, POWDER, FOR SOLUTION INTRAMUSCULAR; INTRAVENOUS EVERY 24 HOURS
Refills: 0 | Status: DISCONTINUED | OUTPATIENT
Start: 2021-01-27 | End: 2021-01-29

## 2021-01-27 RX ORDER — POLYETHYLENE GLYCOL 3350 17 G/17G
17 POWDER, FOR SOLUTION ORAL DAILY
Refills: 0 | Status: DISCONTINUED | OUTPATIENT
Start: 2021-01-27 | End: 2021-01-29

## 2021-01-27 RX ORDER — SENNA PLUS 8.6 MG/1
2 TABLET ORAL AT BEDTIME
Refills: 0 | Status: DISCONTINUED | OUTPATIENT
Start: 2021-01-27 | End: 2021-01-29

## 2021-01-27 RX ORDER — SODIUM CHLORIDE 9 MG/ML
1000 INJECTION INTRAMUSCULAR; INTRAVENOUS; SUBCUTANEOUS
Refills: 0 | Status: COMPLETED | OUTPATIENT
Start: 2021-01-27 | End: 2021-01-27

## 2021-01-27 RX ORDER — SEVELAMER CARBONATE 2400 MG/1
800 POWDER, FOR SUSPENSION ORAL
Refills: 0 | Status: DISCONTINUED | OUTPATIENT
Start: 2021-01-27 | End: 2021-01-27

## 2021-01-27 RX ADMIN — PANTOPRAZOLE SODIUM 40 MILLIGRAM(S): 20 TABLET, DELAYED RELEASE ORAL at 07:08

## 2021-01-27 RX ADMIN — SENNA PLUS 2 TABLET(S): 8.6 TABLET ORAL at 23:04

## 2021-01-27 RX ADMIN — Medication 3 MILLILITER(S): at 18:43

## 2021-01-27 RX ADMIN — Medication 650 MILLIGRAM(S): at 18:43

## 2021-01-27 RX ADMIN — SODIUM CHLORIDE 500 MILLILITER(S): 9 INJECTION INTRAMUSCULAR; INTRAVENOUS; SUBCUTANEOUS at 13:20

## 2021-01-27 RX ADMIN — HEPARIN SODIUM 7500 UNIT(S): 5000 INJECTION INTRAVENOUS; SUBCUTANEOUS at 14:57

## 2021-01-27 RX ADMIN — Medication 650 MILLIGRAM(S): at 07:08

## 2021-01-27 RX ADMIN — Medication 3 MILLILITER(S): at 12:33

## 2021-01-27 RX ADMIN — Medication 2: at 23:03

## 2021-01-27 RX ADMIN — CEFTRIAXONE 100 MILLIGRAM(S): 500 INJECTION, POWDER, FOR SOLUTION INTRAMUSCULAR; INTRAVENOUS at 14:57

## 2021-01-27 RX ADMIN — ARIPIPRAZOLE 20 MILLIGRAM(S): 15 TABLET ORAL at 12:34

## 2021-01-27 RX ADMIN — HEPARIN SODIUM 7500 UNIT(S): 5000 INJECTION INTRAVENOUS; SUBCUTANEOUS at 23:04

## 2021-01-27 RX ADMIN — HEPARIN SODIUM 7500 UNIT(S): 5000 INJECTION INTRAVENOUS; SUBCUTANEOUS at 07:08

## 2021-01-27 RX ADMIN — Medication 3 MILLILITER(S): at 05:42

## 2021-01-27 NOTE — PROGRESS NOTE ADULT - SUBJECTIVE AND OBJECTIVE BOX
Patient is a 70y Female seen and evaluated at bedside. Santillan with >350cc urine this AM. Creat not yet plateaud, no indication for HD at this time. Give 500cc saline bolus for hypoNa.    Meds:    albuterol/ipratropium for Nebulization 3 every 4 hours  ARIPiprazole 20 daily  cefTRIAXone   IVPB 2000 every 24 hours  dextrose 40% Gel 15 once  dextrose 5%. 1000 <Continuous>  dextrose 50% Injectable 25 once  glucagon  Injectable 1 once  heparin   Injectable 7500 every 8 hours  insulin lispro (ADMELOG) corrective regimen sliding scale  Before meals and at bedtime  pantoprazole    Tablet 40 before breakfast  polyethylene glycol 3350 17 daily  senna 2 at bedtime  sevelamer carbonate 800 three times a day with meals  simethicone 80 daily PRN  sodium bicarbonate 650 every 12 hours      T(C): , Max: 37.4 (21 @ 20:30)  T(F): , Max: 99.3 (21 @ 20:30)  HR: 76 (21 @ 04:57)  BP: 111/70 (21 @ 04:57)  BP(mean): --  RR: 18 (21 @ 04:57)  SpO2: 93% (21 @ 04:57)  Wt(kg): --    Height (cm): 160 ( @ 16:20)    Review of Systems:  RESPIRATORY: No shortness of breath  CARDIOVASCULAR: No Chest pain  MUSCULOSKELETAL: No leg swelling    PHYSICAL EXAM:  NEGENERAL: Alert, awake, oriented x3 on RA, comfortable  CHEST/LUNG: Bilaterally CTA  HEART: Regular rate and rhythm, no murmur  ABDOMEN: Soft, nontender, non distended, obese  : santillan in place  EXTREMITIES: no pitting oedema  ACCESS: RIJ temp cath placed - clean, no blood/purulence      LABS:                        10.0   12.75 )-----------( 158      ( 2021 09:13 )             29.8         129<L>  |  92<L>  |  63<H>  ----------------------------<  130<H>  4.0   |  19<L>  |  5.54<H>    Ca    9.2      2021 09:13  Phos  6.4       Mg     2.3         TPro  5.5<L>  /  Alb  2.5<L>  /  TBili  0.2  /  DBili  x   /  AST  136<H>  /  ALT  107<H>  /  AlkPhos  80          Urinalysis Basic - ( 2021 11:35 )    Color: Yellow / Appearance: Clear / S.025 / pH: x  Gluc: x / Ketone: NEGATIVE  / Bili: Small / Urobili: 0.2 E.U./dL   Blood: x / Protein: 100 mg/dL / Nitrite: NEGATIVE   Leuk Esterase: Moderate / RBC: < 5 /HPF / WBC Many /HPF   Sq Epi: x / Non Sq Epi: 0-5 /HPF / Bacteria: Present /HPF      Creatinine, Random Urine: 137 mg/dL ( @ 11:35)  Protein/Creatinine Ratio Calculation: 1.6 Ratio ( @ 11:35)  Sodium, Random Urine: 53 mmol/L ( @ 11:35)  Osmolality, Random Urine: 276 mosm/kg ( @ 11:35)        RADIOLOGY & ADDITIONAL STUDIES:

## 2021-01-27 NOTE — PROGRESS NOTE ADULT - PROBLEM SELECTOR PLAN 3
Patient with history of CKD and then further injured secondary to rhabdomyolysis.   -Nephrology following   -Dialysis as per renal none on 1/27   -500 cc NS bolus on 1/27    #Rhabdomyolysis CK 18K with transaminitis and FACUNDO after being down for 8 hours now downtrending

## 2021-01-27 NOTE — PROGRESS NOTE ADULT - SUBJECTIVE AND OBJECTIVE BOX
O/N Events: OSIRIS   Subjective/ROS: Patient stated she had no prolbems overnight and that her indigestion has improved greatly.  Denies HA, CP, SOB, n/v, changes in bowel/urinary habits.  12pt ROS otherwise negative.    VITALS  Vital Signs Last 24 Hrs  T(C): 36.9 (2021 04:57), Max: 37.4 (2021 20:30)  T(F): 98.4 (2021 04:57), Max: 99.3 (2021 20:30)  HR: 76 (2021 04:57) (76 - 84)  BP: 111/70 (2021 04:57) (93/64 - 111/70)  BP(mean): --  RR: 18 (2021 04:57) (18 - 18)  SpO2: 93% (2021 04:57) (92% - 93%)    CAPILLARY BLOOD GLUCOSE      POCT Blood Glucose.: 126 mg/dL (2021 08:36)  POCT Blood Glucose.: 181 mg/dL (2021 21:30)  POCT Blood Glucose.: 159 mg/dL (2021 17:42)      PHYSICAL EXAM  General: A&Ox3; NAD  Head: NC/AT; MMM; PERRL; EOMI;  Neck: Supple; no JVD  Respiratory: Crackles and wheezing heard in the right lung field  Cardiovascular: Regular rhythm/rate; S1/S2   Gastrointestinal: Soft; NTND; normoactive BS  Extremities: WWP; no edema/cyanosis  Neurological:  CNII-XII grossly intact; no obvious focal deficits    MEDICATIONS  (STANDING):  albuterol/ipratropium for Nebulization 3 milliLiter(s) Nebulizer every 4 hours  ARIPiprazole 20 milliGRAM(s) Oral daily  cefTRIAXone   IVPB 2000 milliGRAM(s) IV Intermittent every 24 hours  dextrose 40% Gel 15 Gram(s) Oral once  dextrose 5%. 1000 milliLiter(s) (50 mL/Hr) IV Continuous <Continuous>  dextrose 50% Injectable 25 Gram(s) IV Push once  glucagon  Injectable 1 milliGRAM(s) IntraMuscular once  heparin   Injectable 7500 Unit(s) SubCutaneous every 8 hours  insulin lispro (ADMELOG) corrective regimen sliding scale   SubCutaneous Before meals and at bedtime  pantoprazole    Tablet 40 milliGRAM(s) Oral before breakfast  polyethylene glycol 3350 17 Gram(s) Oral daily  senna 2 Tablet(s) Oral at bedtime  sodium bicarbonate 650 milliGRAM(s) Oral every 12 hours    MEDICATIONS  (PRN):  simethicone 80 milliGRAM(s) Chew daily PRN Gas      No Known Allergies      LABS                        10.0   12.75 )-----------( 158      ( 2021 09:13 )             29.8         129<L>  |  92<L>  |  63<H>  ----------------------------<  130<H>  4.0   |  19<L>  |  5.54<H>    Ca    9.2      2021 09:13  Phos  6.4       Mg     2.3         TPro  5.5<L>  /  Alb  2.5<L>  /  TBili  0.2  /  DBili  x   /  AST  136<H>  /  ALT  107<H>  /  AlkPhos  80        Urinalysis Basic - ( 2021 11:35 )    Color: Yellow / Appearance: Clear / S.025 / pH: x  Gluc: x / Ketone: NEGATIVE  / Bili: Small / Urobili: 0.2 E.U./dL   Blood: x / Protein: 100 mg/dL / Nitrite: NEGATIVE   Leuk Esterase: Moderate / RBC: < 5 /HPF / WBC Many /HPF   Sq Epi: x / Non Sq Epi: 0-5 /HPF / Bacteria: Present /HPF      CARDIAC MARKERS ( 2021 09:13 )  x     / x     / 2720 U/L / x     / x      CARDIAC MARKERS ( 2021 09:13 )  x     / x     / 4916 U/L / x     / x

## 2021-01-27 NOTE — PROGRESS NOTE ADULT - PROBLEM SELECTOR PLAN 1
Sepsis due to UTI now with bactermia. Cultures growing E. Coli   -CTX to 2g till Feb 2     #Transaminitis secondary to sepsis and Rhabdomyolysis   -Continue Ceftriaxone 2mg     #ATN   FeNa suggesting ATN likely secondary to sepsis   -Dialysis   -Avoid nephrotoxic agents     #Hagma 2/2 to Renal failure   -HD on 1/25  -Bicarb 650 daily

## 2021-01-28 LAB
ALBUMIN SERPL ELPH-MCNC: 2.1 G/DL — LOW (ref 3.3–5)
ALP SERPL-CCNC: 91 U/L — SIGNIFICANT CHANGE UP (ref 40–120)
ALT FLD-CCNC: 99 U/L — HIGH (ref 10–45)
ANION GAP SERPL CALC-SCNC: 15 MMOL/L — SIGNIFICANT CHANGE UP (ref 5–17)
ANISOCYTOSIS BLD QL: SLIGHT — SIGNIFICANT CHANGE UP
AST SERPL-CCNC: 102 U/L — HIGH (ref 10–40)
BASOPHILS # BLD AUTO: 0 K/UL — SIGNIFICANT CHANGE UP (ref 0–0.2)
BASOPHILS NFR BLD AUTO: 0 % — SIGNIFICANT CHANGE UP (ref 0–2)
BILIRUB SERPL-MCNC: 0.2 MG/DL — SIGNIFICANT CHANGE UP (ref 0.2–1.2)
BUN SERPL-MCNC: 68 MG/DL — HIGH (ref 7–23)
BURR CELLS BLD QL SMEAR: PRESENT — SIGNIFICANT CHANGE UP
CALCIUM SERPL-MCNC: 9 MG/DL — SIGNIFICANT CHANGE UP (ref 8.4–10.5)
CHLORIDE SERPL-SCNC: 94 MMOL/L — LOW (ref 96–108)
CK SERPL-CCNC: 1644 U/L — HIGH (ref 25–170)
CO2 SERPL-SCNC: 19 MMOL/L — LOW (ref 22–31)
CREAT SERPL-MCNC: 5.2 MG/DL — HIGH (ref 0.5–1.3)
EOSINOPHIL # BLD AUTO: 0.2 K/UL — SIGNIFICANT CHANGE UP (ref 0–0.5)
EOSINOPHIL NFR BLD AUTO: 1.7 % — SIGNIFICANT CHANGE UP (ref 0–6)
GAMMA INTERFERON BACKGROUND BLD IA-ACNC: 0.04 IU/ML — SIGNIFICANT CHANGE UP
GIANT PLATELETS BLD QL SMEAR: PRESENT — SIGNIFICANT CHANGE UP
GLUCOSE BLDC GLUCOMTR-MCNC: 147 MG/DL — HIGH (ref 70–99)
GLUCOSE BLDC GLUCOMTR-MCNC: 156 MG/DL — HIGH (ref 70–99)
GLUCOSE BLDC GLUCOMTR-MCNC: 163 MG/DL — HIGH (ref 70–99)
GLUCOSE BLDC GLUCOMTR-MCNC: 189 MG/DL — HIGH (ref 70–99)
GLUCOSE BLDC GLUCOMTR-MCNC: 205 MG/DL — HIGH (ref 70–99)
GLUCOSE SERPL-MCNC: 130 MG/DL — HIGH (ref 70–99)
HCT VFR BLD CALC: 29.6 % — LOW (ref 34.5–45)
HGB BLD-MCNC: 10.1 G/DL — LOW (ref 11.5–15.5)
LYMPHOCYTES # BLD AUTO: 0.53 K/UL — LOW (ref 1–3.3)
LYMPHOCYTES # BLD AUTO: 4.4 % — LOW (ref 13–44)
M TB IFN-G BLD-IMP: ABNORMAL
M TB IFN-G CD4+ BCKGRND COR BLD-ACNC: 0.01 IU/ML — SIGNIFICANT CHANGE UP
M TB IFN-G CD4+CD8+ BCKGRND COR BLD-ACNC: 0.01 IU/ML — SIGNIFICANT CHANGE UP
MAGNESIUM SERPL-MCNC: 2.2 MG/DL — SIGNIFICANT CHANGE UP (ref 1.6–2.6)
MANUAL SMEAR VERIFICATION: SIGNIFICANT CHANGE UP
MCHC RBC-ENTMCNC: 29.9 PG — SIGNIFICANT CHANGE UP (ref 27–34)
MCHC RBC-ENTMCNC: 34.1 GM/DL — SIGNIFICANT CHANGE UP (ref 32–36)
MCV RBC AUTO: 87.6 FL — SIGNIFICANT CHANGE UP (ref 80–100)
METAMYELOCYTES # FLD: 0.9 % — HIGH (ref 0–0)
MONOCYTES # BLD AUTO: 0.31 K/UL — SIGNIFICANT CHANGE UP (ref 0–0.9)
MONOCYTES NFR BLD AUTO: 2.6 % — SIGNIFICANT CHANGE UP (ref 2–14)
NEUTROPHILS # BLD AUTO: 10.66 K/UL — HIGH (ref 1.8–7.4)
NEUTROPHILS NFR BLD AUTO: 88.7 % — HIGH (ref 43–77)
NRBC # BLD: 1 /100 — HIGH (ref 0–0)
NRBC # BLD: SIGNIFICANT CHANGE UP /100 WBCS (ref 0–0)
OVALOCYTES BLD QL SMEAR: SLIGHT — SIGNIFICANT CHANGE UP
PHOSPHATE SERPL-MCNC: 6 MG/DL — HIGH (ref 2.5–4.5)
PLAT MORPH BLD: ABNORMAL
PLATELET # BLD AUTO: 179 K/UL — SIGNIFICANT CHANGE UP (ref 150–400)
POIKILOCYTOSIS BLD QL AUTO: SLIGHT — SIGNIFICANT CHANGE UP
POLYCHROMASIA BLD QL SMEAR: SLIGHT — SIGNIFICANT CHANGE UP
POTASSIUM SERPL-MCNC: 4 MMOL/L — SIGNIFICANT CHANGE UP (ref 3.5–5.3)
POTASSIUM SERPL-SCNC: 4 MMOL/L — SIGNIFICANT CHANGE UP (ref 3.5–5.3)
PROT SERPL-MCNC: 5.4 G/DL — LOW (ref 6–8.3)
QUANT TB PLUS MITOGEN MINUS NIL: 0.07 IU/ML — SIGNIFICANT CHANGE UP
RBC # BLD: 3.38 M/UL — LOW (ref 3.8–5.2)
RBC # FLD: 12.9 % — SIGNIFICANT CHANGE UP (ref 10.3–14.5)
RBC BLD AUTO: ABNORMAL
SODIUM SERPL-SCNC: 128 MMOL/L — LOW (ref 135–145)
VARIANT LYMPHS # BLD: 1.7 % — SIGNIFICANT CHANGE UP (ref 0–6)
WBC # BLD: 12.02 K/UL — HIGH (ref 3.8–10.5)
WBC # FLD AUTO: 12.02 K/UL — HIGH (ref 3.8–10.5)

## 2021-01-28 PROCEDURE — 99233 SBSQ HOSP IP/OBS HIGH 50: CPT | Mod: GC

## 2021-01-28 PROCEDURE — 99232 SBSQ HOSP IP/OBS MODERATE 35: CPT

## 2021-01-28 PROCEDURE — 73030 X-RAY EXAM OF SHOULDER: CPT | Mod: 26,LT

## 2021-01-28 RX ORDER — SODIUM CHLORIDE 9 MG/ML
1 INJECTION INTRAMUSCULAR; INTRAVENOUS; SUBCUTANEOUS THREE TIMES A DAY
Refills: 0 | Status: DISCONTINUED | OUTPATIENT
Start: 2021-01-28 | End: 2021-01-29

## 2021-01-28 RX ORDER — SODIUM CHLORIDE 9 MG/ML
500 INJECTION INTRAMUSCULAR; INTRAVENOUS; SUBCUTANEOUS ONCE
Refills: 0 | Status: COMPLETED | OUTPATIENT
Start: 2021-01-28 | End: 2021-01-28

## 2021-01-28 RX ADMIN — HEPARIN SODIUM 7500 UNIT(S): 5000 INJECTION INTRAVENOUS; SUBCUTANEOUS at 21:45

## 2021-01-28 RX ADMIN — HEPARIN SODIUM 7500 UNIT(S): 5000 INJECTION INTRAVENOUS; SUBCUTANEOUS at 15:24

## 2021-01-28 RX ADMIN — Medication 2: at 12:30

## 2021-01-28 RX ADMIN — PANTOPRAZOLE SODIUM 40 MILLIGRAM(S): 20 TABLET, DELAYED RELEASE ORAL at 05:33

## 2021-01-28 RX ADMIN — Medication 650 MILLIGRAM(S): at 05:32

## 2021-01-28 RX ADMIN — Medication 3 MILLILITER(S): at 05:32

## 2021-01-28 RX ADMIN — SODIUM CHLORIDE 1 GRAM(S): 9 INJECTION INTRAMUSCULAR; INTRAVENOUS; SUBCUTANEOUS at 15:24

## 2021-01-28 RX ADMIN — Medication 3 MILLILITER(S): at 15:24

## 2021-01-28 RX ADMIN — SODIUM CHLORIDE 500 MILLILITER(S): 9 INJECTION INTRAMUSCULAR; INTRAVENOUS; SUBCUTANEOUS at 15:25

## 2021-01-28 RX ADMIN — Medication 2: at 22:06

## 2021-01-28 RX ADMIN — Medication 650 MILLIGRAM(S): at 18:02

## 2021-01-28 RX ADMIN — SODIUM CHLORIDE 1 GRAM(S): 9 INJECTION INTRAMUSCULAR; INTRAVENOUS; SUBCUTANEOUS at 21:45

## 2021-01-28 RX ADMIN — ARIPIPRAZOLE 20 MILLIGRAM(S): 15 TABLET ORAL at 18:02

## 2021-01-28 RX ADMIN — Medication 4: at 17:56

## 2021-01-28 RX ADMIN — HEPARIN SODIUM 7500 UNIT(S): 5000 INJECTION INTRAVENOUS; SUBCUTANEOUS at 05:33

## 2021-01-28 RX ADMIN — CEFTRIAXONE 100 MILLIGRAM(S): 500 INJECTION, POWDER, FOR SOLUTION INTRAMUSCULAR; INTRAVENOUS at 15:24

## 2021-01-28 NOTE — DIETITIAN INITIAL EVALUATION ADULT. - ORAL INTAKE PTA/DIET HISTORY
Patient deep in sleep during visit. Per RN, ate 80% of trays. Suspected some non-compliance with diet PTA due to HGBA1C and obesity.

## 2021-01-28 NOTE — PROGRESS NOTE ADULT - PROBLEM SELECTOR PLAN 3
Patient with history of CKD and then further injured secondary to rhabdomyolysis.   -Nephrology following   -Dialysis as per renal none on 1/27   -500 cc NS bolus on 1/27    #Rhabdomyolysis CK 18K with transaminitis and FACUNDO after being down for 8 hours now downtrending Patient with history of CKD and then further injured secondary to rhabdomyolysis.   -Nephrology following   -Dialysis only required on 1/25  -500 cc NS bolus  -Daily weights, I/Os    #Rhabdomyolysis CK 18K with transaminitis and FACUNDO after being down for 8 hours now downtrending

## 2021-01-28 NOTE — PROGRESS NOTE ADULT - PROBLEM SELECTOR PLAN 9
Reports starting Lasix for LE edema. No Dx of CHF.  - Holding in setting of Rhabdo and poor volume statis  - Echocardiogram  - Consder Cardiology consult based on Echo results Reports starting Lasix for LE edema. No Dx of CHF.  - Holding in setting of Rhabdo and poor volume statis

## 2021-01-28 NOTE — DIETITIAN INITIAL EVALUATION ADULT. - PERTINENT LABORATORY DATA
HGBA1C:7.8,Glucose:130,FS:189,Na:128,Bun:68,Creatinine:5.2,Albumin:2.1,Phos:6.0,SGOT:102,SGPT:99,H/H:10.1/29.6

## 2021-01-28 NOTE — DIETITIAN INITIAL EVALUATION ADULT. - DIET TYPE
consistent carbohydrate (no snacks)/DASH/TLC (sodium and cholesterol restricted diet)/no concentrated potassium/no concentrated phosphorus/60 gm protein

## 2021-01-28 NOTE — PROGRESS NOTE ADULT - NUTRITIONAL ASSESSMENT
Sepsis due to UTI now with bacteremia. Cultures growing E. Coli   -CTX to 2g till Feb 2     #Transaminitis secondary to sepsis and Rhabdomyolysis   -Continue Ceftriaxone 2mg     #ATN   FeNa suggesting ATN likely secondary to sepsis   -Dialysis   -Avoid nephrotoxic agents     #Hagma 2/2 to Renal failure   -HD on 1/25  -Bicarb 650 daily

## 2021-01-28 NOTE — PROGRESS NOTE ADULT - SUBJECTIVE AND OBJECTIVE BOX
"----- Message from Mitali Wong sent at 3/13/2017 10:42 AM CDT -----  Contact: [phone]  Patient would like to speak with you she is having "leakage"  " Patient is a 70y Female seen and evaluated at bedside. Improved UrOut, no longer oliguric. Creat plateaud. No indication for HD today, will maintain HD cath and monitor for recovery.    Meds:    albuterol/ipratropium for Nebulization 3 every 4 hours  ARIPiprazole 20 daily  cefTRIAXone   IVPB 2000 every 24 hours  dextrose 40% Gel 15 once  dextrose 5%. 1000 <Continuous>  dextrose 50% Injectable 25 once  glucagon  Injectable 1 once  heparin   Injectable 7500 every 8 hours  insulin lispro (ADMELOG) corrective regimen sliding scale  Before meals and at bedtime  pantoprazole    Tablet 40 before breakfast  polyethylene glycol 3350 17 daily  senna 2 at bedtime  simethicone 80 daily PRN  sodium bicarbonate 650 every 12 hours  sodium chloride 1 three times a day      T(C): , Max: 36.9 (21 @ 20:35)  T(F): , Max: 98.4 (21 @ 20:35)  HR: 76 (21 @ 10:07)  BP: 135/65 (21 @ 10:07)  BP(mean): --  RR: 17 (21 @ 10:07)  SpO2: 96% (21 @ 10:07)  Wt(kg): --     @ 07:  -   @ 07:00  --------------------------------------------------------  IN: 500 mL / OUT: 600 mL / NET: -100 mL          Review of Systems:  RESPIRATORY: No shortness of breath  CARDIOVASCULAR: No Chest pain  MUSCULOSKELETAL: No leg swelling    PHYSICAL EXAM:  NEGENERAL: Alert, awake, oriented x3 on RA, comfortable  CHEST/LUNG: Bilaterally CTA  HEART: Regular rate and rhythm, no murmur  ABDOMEN: Soft, nontender, non distended, obese  : santillan in place  EXTREMITIES: no pitting oedema  ACCESS: RIJ temp cath placed - clean dressing      LABS:                        10.1   12.02 )-----------( 179      ( 2021 05:50 )             29.6         128<L>  |  94<L>  |  68<H>  ----------------------------<  130<H>  4.0   |  19<L>  |  5.20<H>    Ca    9.0      2021 05:50  Phos  6.0       Mg     2.2         TPro  5.4<L>  /  Alb  2.1<L>  /  TBili  0.2  /  DBili  x   /  AST  102<H>  /  ALT  99<H>  /  AlkPhos  91          Urinalysis Basic - ( 2021 11:35 )    Color: Yellow / Appearance: Clear / S.025 / pH: x  Gluc: x / Ketone: NEGATIVE  / Bili: Small / Urobili: 0.2 E.U./dL   Blood: x / Protein: 100 mg/dL / Nitrite: NEGATIVE   Leuk Esterase: Moderate / RBC: < 5 /HPF / WBC Many /HPF   Sq Epi: x / Non Sq Epi: 0-5 /HPF / Bacteria: Present /HPF      Creatinine, Random Urine: 137 mg/dL ( @ 11:35)  Protein/Creatinine Ratio Calculation: 1.6 Ratio ( @ 11:35)  Sodium, Random Urine: 53 mmol/L ( @ 11:35)  Osmolality, Random Urine: 276 mosm/kg ( @ 11:35)        RADIOLOGY & ADDITIONAL STUDIES:
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
## 2021-01-28 NOTE — PROGRESS NOTE ADULT - PROBLEM SELECTOR PLAN 8
On lisinopril 40mg at home  - In setting of FACUNDO and low BP will hold for now

## 2021-01-28 NOTE — PROGRESS NOTE ADULT - PROBLEM SELECTOR PLAN 1
Sepsis due to UTI now with bactermia. Cultures growing E. Coli   -CTX to 2g till Feb 2     #Transaminitis secondary to sepsis and Rhabdomyolysis   -Continue Ceftriaxone 2mg     #ATN   FeNa suggesting ATN likely secondary to sepsis   -Dialysis   -Avoid nephrotoxic agents     #Hagma 2/2 to Renal failure   -HD on 1/25  -Bicarb 650 daily Sepsis due to UTI now with bacteremia. Cultures growing E. Coli   -CTX to 2g till Feb 2     #Transaminitis secondary to sepsis and Rhabdomyolysis   -Continue Ceftriaxone 2mg   - Trend LFTs    #ATN   FeNa suggesting ATN likely secondary to sepsis   -Not requiring dialysis on 1/28   -Avoid nephrotoxic agents     #Hagma 2/2 to Renal failure   -HD on 1/25  -Bicarb 650 daily    #Hyponatremia   Start NaCl tabs 1g TID for hypoNa; fluid restrict to 1.5L

## 2021-01-28 NOTE — PROGRESS NOTE ADULT - PROBLEM SELECTOR PLAN 6
On Abilify 20mg. Cooperative at baseline and well controlled.  - Plan to continue home regimen

## 2021-01-28 NOTE — PROGRESS NOTE ADULT - PROBLEM SELECTOR PLAN 10
F: NS   E: On dialysis will replete with care   N: DASH/TLC Diabetes  DVT: Sub q heparin 7500  GI: None   Bowel Regimen: Not requiring at this moment   Dispo: RMF  CODE: Full Code
F: NS   E: On dialysis will replete with care   N: DASH/TLC Diabetes  DVT: Sub q heparin 7500  GI: None   Bowel Regimen: Not requiring at this moment   Dispo: RMF  CODE: Full Code
F: 500 cc bolus   E: On dialysis will replete with care   N: DASH/TLC Diabetes  DVT: Sub q heparin 7500  GI: None   Bowel Regimen: Not requiring at this moment   Dispo: RMF  CODE: Full Code
F: 500 cc bolus   E: On dialysis will replete with care   N: DASH/TLC Diabetes  DVT: Sub q heparin 7500  GI: None   Bowel Regimen: Not requiring at this moment   Dispo: RMF  CODE: Full Code

## 2021-01-28 NOTE — PROGRESS NOTE ADULT - PROBLEM SELECTOR PLAN 7
On Onglyza at home and Metformin A1c of 7.8  - mISS  - calculate need for Lantus dosing as per requirements

## 2021-01-28 NOTE — DIETITIAN INITIAL EVALUATION ADULT. - OTHER INFO
Obese appearing 71y/o female with history of Asthma,HTN,2TDM,Left hip arthritis ,Bi-polar D/O presented with generalized weakness,Rhabdomylosis/FACUNDO malaise and urinary symptoms. Noted with santillan and HD cath(none since 11/2020 noted).Patient is 174% of IBW at 200lbs.Presently eating 80%.No N/V/D/C or pain reported.No BM reported.Skin intact PU wise.Suspected fair diet compliance PTA due to reflective labs and obesity.Presently unable to complete a diet history.Patient asleep x2 attempts.RD would recommend due to no HD and reflective renal parameters, change diet to lower protein level than renal diet provides.

## 2021-01-28 NOTE — PROGRESS NOTE ADULT - PROBLEM SELECTOR PROBLEM 3
Acute kidney injury superimposed on CKD
Rhabdomyolysis
Rhabdomyolysis
Acute kidney injury superimposed on CKD
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
## 2021-01-28 NOTE — PROGRESS NOTE ADULT - PROBLEM SELECTOR PLAN 5
On Rosuvastatin 5mg QD  - Plan to hold in setting of transaminitis

## 2021-01-29 ENCOUNTER — TRANSCRIPTION ENCOUNTER (OUTPATIENT)
Age: 71
End: 2021-01-29

## 2021-01-29 VITALS
OXYGEN SATURATION: 95 % | HEART RATE: 68 BPM | DIASTOLIC BLOOD PRESSURE: 79 MMHG | RESPIRATION RATE: 17 BRPM | SYSTOLIC BLOOD PRESSURE: 149 MMHG | TEMPERATURE: 99 F

## 2021-01-29 LAB
ALBUMIN SERPL ELPH-MCNC: 2.2 G/DL — LOW (ref 3.3–5)
ALP SERPL-CCNC: 104 U/L — SIGNIFICANT CHANGE UP (ref 40–120)
ALT FLD-CCNC: 85 U/L — HIGH (ref 10–45)
ANION GAP SERPL CALC-SCNC: 15 MMOL/L — SIGNIFICANT CHANGE UP (ref 5–17)
AST SERPL-CCNC: 65 U/L — HIGH (ref 10–40)
BASOPHILS # BLD AUTO: 0.06 K/UL — SIGNIFICANT CHANGE UP (ref 0–0.2)
BASOPHILS NFR BLD AUTO: 0.4 % — SIGNIFICANT CHANGE UP (ref 0–2)
BILIRUB SERPL-MCNC: 0.2 MG/DL — SIGNIFICANT CHANGE UP (ref 0.2–1.2)
BUN SERPL-MCNC: 64 MG/DL — HIGH (ref 7–23)
CALCIUM SERPL-MCNC: 9.1 MG/DL — SIGNIFICANT CHANGE UP (ref 8.4–10.5)
CHLORIDE SERPL-SCNC: 99 MMOL/L — SIGNIFICANT CHANGE UP (ref 96–108)
CO2 SERPL-SCNC: 18 MMOL/L — LOW (ref 22–31)
CREAT SERPL-MCNC: 4.37 MG/DL — HIGH (ref 0.5–1.3)
EOSINOPHIL # BLD AUTO: 0.37 K/UL — SIGNIFICANT CHANGE UP (ref 0–0.5)
EOSINOPHIL NFR BLD AUTO: 2.8 % — SIGNIFICANT CHANGE UP (ref 0–6)
GAMMA INTERFERON BACKGROUND BLD IA-ACNC: 0.04 IU/ML — SIGNIFICANT CHANGE UP
GLUCOSE BLDC GLUCOMTR-MCNC: 141 MG/DL — HIGH (ref 70–99)
GLUCOSE BLDC GLUCOMTR-MCNC: 173 MG/DL — HIGH (ref 70–99)
GLUCOSE BLDC GLUCOMTR-MCNC: 192 MG/DL — HIGH (ref 70–99)
GLUCOSE SERPL-MCNC: 172 MG/DL — HIGH (ref 70–99)
HCT VFR BLD CALC: 29.9 % — LOW (ref 34.5–45)
HGB BLD-MCNC: 9.9 G/DL — LOW (ref 11.5–15.5)
IMM GRANULOCYTES NFR BLD AUTO: 6.1 % — HIGH (ref 0–1.5)
LYMPHOCYTES # BLD AUTO: 1.1 K/UL — SIGNIFICANT CHANGE UP (ref 1–3.3)
LYMPHOCYTES # BLD AUTO: 8.2 % — LOW (ref 13–44)
M TB IFN-G BLD-IMP: ABNORMAL
M TB IFN-G CD4+ BCKGRND COR BLD-ACNC: 0 IU/ML — SIGNIFICANT CHANGE UP
M TB IFN-G CD4+CD8+ BCKGRND COR BLD-ACNC: 0 IU/ML — SIGNIFICANT CHANGE UP
MAGNESIUM SERPL-MCNC: 2.1 MG/DL — SIGNIFICANT CHANGE UP (ref 1.6–2.6)
MCHC RBC-ENTMCNC: 28.7 PG — SIGNIFICANT CHANGE UP (ref 27–34)
MCHC RBC-ENTMCNC: 33.1 GM/DL — SIGNIFICANT CHANGE UP (ref 32–36)
MCV RBC AUTO: 86.7 FL — SIGNIFICANT CHANGE UP (ref 80–100)
MONOCYTES # BLD AUTO: 1.16 K/UL — HIGH (ref 0–0.9)
MONOCYTES NFR BLD AUTO: 8.7 % — SIGNIFICANT CHANGE UP (ref 2–14)
NEUTROPHILS # BLD AUTO: 9.88 K/UL — HIGH (ref 1.8–7.4)
NEUTROPHILS NFR BLD AUTO: 73.8 % — SIGNIFICANT CHANGE UP (ref 43–77)
NRBC # BLD: 0 /100 WBCS — SIGNIFICANT CHANGE UP (ref 0–0)
PHOSPHATE SERPL-MCNC: 5.7 MG/DL — HIGH (ref 2.5–4.5)
PLATELET # BLD AUTO: 209 K/UL — SIGNIFICANT CHANGE UP (ref 150–400)
POTASSIUM SERPL-MCNC: 3.9 MMOL/L — SIGNIFICANT CHANGE UP (ref 3.5–5.3)
POTASSIUM SERPL-SCNC: 3.9 MMOL/L — SIGNIFICANT CHANGE UP (ref 3.5–5.3)
PROT SERPL-MCNC: 5.4 G/DL — LOW (ref 6–8.3)
QUANT TB PLUS MITOGEN MINUS NIL: 0.07 IU/ML — SIGNIFICANT CHANGE UP
RBC # BLD: 3.45 M/UL — LOW (ref 3.8–5.2)
RBC # FLD: 13 % — SIGNIFICANT CHANGE UP (ref 10.3–14.5)
SODIUM SERPL-SCNC: 132 MMOL/L — LOW (ref 135–145)
WBC # BLD: 13.38 K/UL — HIGH (ref 3.8–10.5)
WBC # FLD AUTO: 13.38 K/UL — HIGH (ref 3.8–10.5)

## 2021-01-29 PROCEDURE — 99285 EMERGENCY DEPT VISIT HI MDM: CPT | Mod: 25

## 2021-01-29 PROCEDURE — 76705 ECHO EXAM OF ABDOMEN: CPT

## 2021-01-29 PROCEDURE — 76770 US EXAM ABDO BACK WALL COMP: CPT

## 2021-01-29 PROCEDURE — 99231 SBSQ HOSP IP/OBS SF/LOW 25: CPT

## 2021-01-29 PROCEDURE — 87340 HEPATITIS B SURFACE AG IA: CPT

## 2021-01-29 PROCEDURE — 70450 CT HEAD/BRAIN W/O DYE: CPT

## 2021-01-29 PROCEDURE — 85025 COMPLETE CBC W/AUTO DIFF WBC: CPT

## 2021-01-29 PROCEDURE — 71045 X-RAY EXAM CHEST 1 VIEW: CPT

## 2021-01-29 PROCEDURE — 80307 DRUG TEST PRSMV CHEM ANLYZR: CPT

## 2021-01-29 PROCEDURE — 73502 X-RAY EXAM HIP UNI 2-3 VIEWS: CPT

## 2021-01-29 PROCEDURE — 90935 HEMODIALYSIS ONE EVALUATION: CPT

## 2021-01-29 PROCEDURE — 80048 BASIC METABOLIC PNL TOTAL CA: CPT

## 2021-01-29 PROCEDURE — 82550 ASSAY OF CK (CPK): CPT

## 2021-01-29 PROCEDURE — 72125 CT NECK SPINE W/O DYE: CPT

## 2021-01-29 PROCEDURE — 97110 THERAPEUTIC EXERCISES: CPT

## 2021-01-29 PROCEDURE — 84300 ASSAY OF URINE SODIUM: CPT

## 2021-01-29 PROCEDURE — 83690 ASSAY OF LIPASE: CPT

## 2021-01-29 PROCEDURE — 97530 THERAPEUTIC ACTIVITIES: CPT

## 2021-01-29 PROCEDURE — 82962 GLUCOSE BLOOD TEST: CPT

## 2021-01-29 PROCEDURE — 86706 HEP B SURFACE ANTIBODY: CPT

## 2021-01-29 PROCEDURE — 86704 HEP B CORE ANTIBODY TOTAL: CPT

## 2021-01-29 PROCEDURE — 73562 X-RAY EXAM OF KNEE 3: CPT

## 2021-01-29 PROCEDURE — 90471 IMMUNIZATION ADMIN: CPT

## 2021-01-29 PROCEDURE — 86803 HEPATITIS C AB TEST: CPT

## 2021-01-29 PROCEDURE — 36415 COLL VENOUS BLD VENIPUNCTURE: CPT

## 2021-01-29 PROCEDURE — 99239 HOSP IP/OBS DSCHRG MGMT >30: CPT | Mod: GC

## 2021-01-29 PROCEDURE — 87186 SC STD MICRODIL/AGAR DIL: CPT

## 2021-01-29 PROCEDURE — 81001 URINALYSIS AUTO W/SCOPE: CPT

## 2021-01-29 PROCEDURE — 83036 HEMOGLOBIN GLYCOSYLATED A1C: CPT

## 2021-01-29 PROCEDURE — 93005 ELECTROCARDIOGRAM TRACING: CPT

## 2021-01-29 PROCEDURE — 94640 AIRWAY INHALATION TREATMENT: CPT

## 2021-01-29 PROCEDURE — 90715 TDAP VACCINE 7 YRS/> IM: CPT

## 2021-01-29 PROCEDURE — 0225U NFCT DS DNA&RNA 21 SARSCOV2: CPT

## 2021-01-29 PROCEDURE — 87040 BLOOD CULTURE FOR BACTERIA: CPT

## 2021-01-29 PROCEDURE — 82570 ASSAY OF URINE CREATININE: CPT

## 2021-01-29 PROCEDURE — 84156 ASSAY OF PROTEIN URINE: CPT

## 2021-01-29 PROCEDURE — 87086 URINE CULTURE/COLONY COUNT: CPT

## 2021-01-29 PROCEDURE — 73030 X-RAY EXAM OF SHOULDER: CPT

## 2021-01-29 PROCEDURE — 84540 ASSAY OF URINE/UREA-N: CPT

## 2021-01-29 PROCEDURE — 87150 DNA/RNA AMPLIFIED PROBE: CPT

## 2021-01-29 PROCEDURE — 97161 PT EVAL LOW COMPLEX 20 MIN: CPT

## 2021-01-29 PROCEDURE — 83605 ASSAY OF LACTIC ACID: CPT

## 2021-01-29 PROCEDURE — 83735 ASSAY OF MAGNESIUM: CPT

## 2021-01-29 PROCEDURE — 84100 ASSAY OF PHOSPHORUS: CPT

## 2021-01-29 PROCEDURE — 96374 THER/PROPH/DIAG INJ IV PUSH: CPT

## 2021-01-29 PROCEDURE — 80053 COMPREHEN METABOLIC PANEL: CPT

## 2021-01-29 PROCEDURE — 86480 TB TEST CELL IMMUN MEASURE: CPT

## 2021-01-29 PROCEDURE — 83935 ASSAY OF URINE OSMOLALITY: CPT

## 2021-01-29 RX ORDER — METFORMIN HYDROCHLORIDE 850 MG/1
1 TABLET ORAL
Qty: 0 | Refills: 0 | DISCHARGE

## 2021-01-29 RX ORDER — SODIUM BICARBONATE 1 MEQ/ML
1 SYRINGE (ML) INTRAVENOUS
Qty: 0 | Refills: 0 | DISCHARGE
Start: 2021-01-29

## 2021-01-29 RX ORDER — SAXAGLIPTIN 5 MG/1
1 TABLET, FILM COATED ORAL
Qty: 0 | Refills: 0 | DISCHARGE

## 2021-01-29 RX ORDER — SIMETHICONE 80 MG/1
1 TABLET, CHEWABLE ORAL
Qty: 0 | Refills: 0 | DISCHARGE
Start: 2021-01-29

## 2021-01-29 RX ORDER — PANTOPRAZOLE SODIUM 20 MG/1
1 TABLET, DELAYED RELEASE ORAL
Qty: 0 | Refills: 0 | DISCHARGE
Start: 2021-01-29

## 2021-01-29 RX ORDER — IPRATROPIUM/ALBUTEROL SULFATE 18-103MCG
3 AEROSOL WITH ADAPTER (GRAM) INHALATION
Qty: 0 | Refills: 0 | DISCHARGE
Start: 2021-01-29

## 2021-01-29 RX ORDER — LISINOPRIL 2.5 MG/1
1 TABLET ORAL
Qty: 0 | Refills: 0 | DISCHARGE

## 2021-01-29 RX ORDER — FUROSEMIDE 40 MG
1 TABLET ORAL
Qty: 0 | Refills: 0 | DISCHARGE

## 2021-01-29 RX ORDER — INSULIN LISPRO 100/ML
0 VIAL (ML) SUBCUTANEOUS
Qty: 0 | Refills: 0 | DISCHARGE
Start: 2021-01-29

## 2021-01-29 RX ORDER — CEPHALEXIN 500 MG
1 CAPSULE ORAL
Qty: 18 | Refills: 0
Start: 2021-01-29 | End: 2021-02-06

## 2021-01-29 RX ADMIN — ARIPIPRAZOLE 20 MILLIGRAM(S): 15 TABLET ORAL at 13:47

## 2021-01-29 RX ADMIN — Medication 3 MILLILITER(S): at 01:30

## 2021-01-29 RX ADMIN — HEPARIN SODIUM 7500 UNIT(S): 5000 INJECTION INTRAVENOUS; SUBCUTANEOUS at 13:44

## 2021-01-29 RX ADMIN — SODIUM CHLORIDE 1 GRAM(S): 9 INJECTION INTRAMUSCULAR; INTRAVENOUS; SUBCUTANEOUS at 13:44

## 2021-01-29 RX ADMIN — Medication 2: at 11:53

## 2021-01-29 RX ADMIN — Medication 3 MILLILITER(S): at 11:54

## 2021-01-29 RX ADMIN — Medication 3 MILLILITER(S): at 05:43

## 2021-01-29 RX ADMIN — Medication 2: at 09:38

## 2021-01-29 RX ADMIN — CEFTRIAXONE 100 MILLIGRAM(S): 500 INJECTION, POWDER, FOR SOLUTION INTRAMUSCULAR; INTRAVENOUS at 13:47

## 2021-01-29 RX ADMIN — HEPARIN SODIUM 7500 UNIT(S): 5000 INJECTION INTRAVENOUS; SUBCUTANEOUS at 05:43

## 2021-01-29 RX ADMIN — PANTOPRAZOLE SODIUM 40 MILLIGRAM(S): 20 TABLET, DELAYED RELEASE ORAL at 05:43

## 2021-01-29 RX ADMIN — Medication 650 MILLIGRAM(S): at 17:08

## 2021-01-29 RX ADMIN — Medication 3 MILLILITER(S): at 14:25

## 2021-01-29 RX ADMIN — Medication 3 MILLILITER(S): at 17:08

## 2021-01-29 RX ADMIN — SODIUM CHLORIDE 1 GRAM(S): 9 INJECTION INTRAMUSCULAR; INTRAVENOUS; SUBCUTANEOUS at 05:43

## 2021-01-29 RX ADMIN — Medication 650 MILLIGRAM(S): at 05:51

## 2021-01-29 NOTE — DISCHARGE NOTE PROVIDER - PROVIDER TOKENS
PROVIDER:[TOKEN:[[id number]]] PROVIDER:[TOKEN:[[id number]]],PROVIDER:[TOKEN:[[id number]],SCHEDULEDAPPT:[02/16/2021]]

## 2021-01-29 NOTE — PROGRESS NOTE ADULT - ATTENDING COMMENTS
tolerating dialysis  continue full dialysis treatment as prescribed
ATN from rhabdomyolysis   required HD on 1/25-  remains oliguric, but urine outflow improving  no improvement in creat yet- okay to defer HD today,  continue close monitoring - potential need for repeat HD tomorrow   not SOB, No N, V
ATN from rhabdomyolysis urine output about 500 ml  required HD on 1/25-  No SOB.  No N, V  continue close monitoring - potential need for repeat HD tomorrow
FACUNDO, likely ATN  tolerated HD well yesterday  no SOB  remains oliguric  defer repeat hD today  reassess in AM for need for repeat HD
ATN from rhabdomyolysis  in recovery phase  do not anticipate need for additional dialysis  dc HD catheter-  stable for dc  but will need close follow up of urine output and chems
Pt seen and examined 1/25. Oliguric thus nephrology c/s
Pt states appetite and energy is improved today. Abd discomfort improved after large belch this AM per pt. Sat up on edge of bed w PT. Overall does appear more energized today. CPK continues to improve 2.7k from 4.9k. Surveillance BCx negative. Continues on IV CTX.  --F/U Nephro recs - fluid challenge and monitor UOP  --Pt will need extended course of Abx - monitor BMs (currently 1/d)  --Encourage OOB  DISPO: JOAO pending final nephro recs; anticipating 24-48h to discharge
Pt seen today. States she has some epigastric discomfort after breakfast but it is improving. No nausea, emesis, LH/Dizziness, dyspnea. States pain in legs are improving. Strength improving on exam in BLE - 4/5 in hip and knee flex b/l.     #FACUNDO on CKD c/b ATN due to traumatic rhabdomyolysis - 4.6 from 4.8   - BPK improving. peaked 21k; now in 4k range  #Sepsis due to UTI complicated by E coli bacteremia - appears non-toxic  #Fall  #HTN - holding agents in setting of ATN  #Transaminitis - likely 2/2 rhabdomyolysis. improving  #Leukocytosis - improving - possibly concentrated on admission  #Normocytic anemia   #Asthma - pulm seen. Does not appear in acute exacerbation. PRN negs  #HLD  #DM2 - 7.8; SSI  #Obesity - 35  #Depression - abilify 20  #Cholelithiasis - seen on US    DISPO: JOAO - w HD capacity. Pending final renal recs, sensitivities. Anticipate time to DC 48h
States abd pain, bloating improved w breakfast. Noted on exam 2/5 w L shoulder flexion. No pain on passive mvmt, joint swelling, decrease in sensation or numbness. States difficulty moving arm occurred after fall.  -Obtain L shoulder film  -ddx includes rotator cuff injury (less likely due to no pain w passive ROM) vs neuropathy vs radiculopathy (though no associated pain) vs central process in setting of recent trauma. Note C spine CT obtained wo acute processes on admission  -May warrant further imaging and neuro evaluation if examination not improved  -per nephro may not need further HD 2/2 renal recovery; c/w monitoring  DISPO: JOAO pending above

## 2021-01-29 NOTE — DISCHARGE NOTE PROVIDER - CARE PROVIDER_API CALL
Nancy Truong (MD)  Internal Medicine; Nephrology  [address]  [place], [place] [address]  Phone: [phone]  Fax: [phone]  Follow Up Time:    Nancy Truong)  Internal Medicine; Nephrology  [address] [place] [address]  Phone: [phone]  Fax: [phone]  Follow Up Time:     Ashish Marlow)  Internal Medicine  [address] [address]  Phone: [phone]  Fax: [phone]  Scheduled Appointment: 02/16/2021

## 2021-01-29 NOTE — DISCHARGE NOTE PROVIDER - NSDCCPCAREPLAN_GEN_ALL_CORE_FT
PRINCIPAL DISCHARGE DIAGNOSIS  Diagnosis: UTI (urinary tract infection)  Assessment and Plan of Treatment: You were noted to have a urinary tract infection (UTI) during your admission to [facility]. This was found based on your symptom profile (urinary frequency and/or pain on urination) as well as your urine tested for any infectious organisms. You received antibiotics throughout your hospital course. Your course was complicated by the bacteria entering your blood stream, which has since resolved. Please follow-up with your primary care physician. Please continue to take Keflex two times a day from 1/30-2/7 - they will continue giving it to you at the rehab. Should you notice any symptoms such as but not limited to: unrelenting/severe fever (temperatuer >103 F and/or lasting >3 days), worsening pain on urination, urinary discharge, increased urinary frequency, chills, severe flank/lower back pain, or bloody urine, please return to the emergency department for interval evaluation.        SECONDARY DISCHARGE DIAGNOSES  Diagnosis: Rhabdomyolysis  Assessment and Plan of Treatment: You had a condition called rhabdomyolysis which is due to extensive muscle breakdown. This was probably from dehydration due to your infection and prolonged time down afterwards. We treated you with fluids, but you did require one session of dialysis until your kidneys fully recovered. You still have L shoulder weakness from the muscle breakdown which should get better overtime - PLEASE CONTINUE TO WORKEN WITH THE REHAB TO STRENGTHEN THIS.    Diagnosis: Acute kidney injury superimposed on CKD  Assessment and Plan of Treatment: You had acute renal failure from the rhabdomyolsis which required one session of dialysis. Your kidneys recovered and you did not require any additional sessions. You should still have your labs checked at least once a week to make sure your kidney function is not getting any worse. We also started you on bicarbonate tabs to reduce the amount of acid in your blood because of the kidney failure. We also held your lisinopril because of the kidney injury - do not resume this while your kidney function is abnormal.    Diagnosis: Diabetes mellitus  Assessment and Plan of Treatment: You have a known history of diabetes mellitus prior to your admission. This condition results from blood sugar levels getting too high because your body is more resistant to insulin. Uncontrolled blood sugar levels can lead to kidney and heart damage, pain/numbness/paralysis in your hands and feet, and increased rates of infections. It is important that you continue to take this medication when you are discharged so that you can continue to control your blood sugar levels. Additionally be sure to follow up with your primary care physician, podiatrist, and ophthalmologist on a regular basis.  - We treated you with sliding scale insulin, which you should continue at the rehab   - You are on oral medications at home; you can continue your metformin and onglyza once you are discharged from rehab    Diagnosis: Bipolar disorder  Assessment and Plan of Treatment: Please continue taking your abilify as prescribed.

## 2021-01-29 NOTE — PROGRESS NOTE ADULT - REASON FOR ADMISSION
rhabdomyolysis/ FACUNDO/ UTI

## 2021-01-29 NOTE — DISCHARGE NOTE PROVIDER - NSDCFUADDAPPT_GEN_ALL_CORE_FT
Please follow up with your PCP, Dr. Marlow, on your scheduled date 2/16.     If your labs show that your kidney function did not recover, please see a nephrologist for further management. Dr. Montes information is provided - she is the nephrologist that saw you here.

## 2021-01-29 NOTE — PROGRESS NOTE ADULT - PROVIDER SPECIALTY LIST ADULT
Internal Medicine
Nephrology
Rehab Medicine
Rehab Medicine
Nephrology
Internal Medicine

## 2021-01-29 NOTE — DISCHARGE NOTE NURSING/CASE MANAGEMENT/SOCIAL WORK - PATIENT PORTAL LINK FT
You can access the FollowMyHealth Patient Portal offered by [facility] by registering at the following website: [email or web address]. By joining [facility]’s FollowMyHealth portal, you will also be able to view your health information using other applications (apps) compatible with our system.

## 2021-01-29 NOTE — PROGRESS NOTE ADULT - ASSESSMENT
70F PMH Asthma (Dx after 9/11 attack) HTN, DM, HLD, L hip arthritis, Bipolar Disorder admitted for management of UTI s/p mechanical fall now with rhabdomyolysis.
70F PMH Asthma (Dx after 9/11 attack) HTN, DM, HLD, L hip arthritis, Bipolar Disorder presented for evaluation of generalized weakness, malaise, urinary symptoms and mechanical fall - down for 8 hours found to have severe rhabdo with anuria. Renal consulted.    Oligo-anuric FACUNDO 2/2 to rhabdomyolysis causing pigment nephropathy ATN  Baseline creat- 0.6 in 10/20  S/p HDx1 yesterday for volume overload. UF 1.5L.   No need for HD today, UrOut improving- lytes acceptable  CPK <5000    Repeat UA more consistent with rhabdo and UTI.  Continue to trend BMP and strict I&Os with santillan daily- if UrOut increases, will dc temp cath  Continue PO hydration    Get renal/bladder sono- larg kidneys b/l with fullness, no hydro, no echogenicity  BP: normotensive  Hgb at goal  HAGMA from renal failure- start bicarb 650 BID  BMD: wnl    Daily weights, strict I&Os, renally dose meds, renal diet, avoid nephrotoxic meds.      
70F PMH Asthma (Dx after 9/11 attack) HTN, DM, HLD, L hip arthritis, Bipolar Disorder presented for evaluation of generalized weakness, malaise, urinary symptoms and mechanical fall - down for 8 hours found to have severe rhabdo with anuria. Renal consulted.    Oliguric FACUNDO 2/2 to rhabdomyolysis causing pigment nephropathy ATN  Baseline creat- 0.6 in 10/20  S/p HDx1 11/25 for volume overload.  No indication for HD since    UrOut increasing >350cc urine this AM.   Creat not yet plateaud  Keep temp HD cath and santillan in place for now; will continue to monitor    Give 500cc saline bolus for hypoNa; change rocephin to NS solution  Renal/bladder sono- unremarkable  BP: normotensive  Hgb at goal  HAGMA from renal failure- on bicarb 650 BID  BMD: Phos noted- continue to monitor    Daily weights, strict I&Os, renally dose meds, renal diet, avoid nephrotoxic meds.
70F PMH Asthma (Dx after 9/11 attack) HTN, DM, HLD, L hip arthritis, Bipolar Disorder presented for evaluation of generalized weakness, malaise, urinary symptoms and mechanical fall - down for 8 hours found to have severe rhabdo with anuria. Renal consulted.    Pigment nephropathy ATN from rhabdo, no longer oliguric  Baseline creat- 0.6 in 10/20  S/p HDx1 11/25 for volume overload.  No indication for HD since    UrOut increasing >600cc urine this AM; creat in 5s  Keep temp HD cath and santillan in place for now; will continue to monitor    Can start NaCl tabs 1g TID for hypoNa; fluid restrict to 1.5L  BP: normotensive  Hgb at goal  HAGMA from renal failure- on bicarb 650 BID  BMD: Phos noted- continue to monitor    Daily weights, strict I&Os, renally dose meds, renal diet, avoid nephrotoxic meds.
per Internal Medicine    71 yo F PMH Asthma (Dx after 9/11 attack) HTN, DM, HLD, L hip arthritis, Bipolar Disorder admitted for management of UTI s/p mechanical fall now with rhabdomyolysis.    Problem/Plan - 1:  ·  Problem: Sepsis.  Plan: Patient noted to have increased with +UA  leukocytosis and blood cultures growing E. Coli.  -CTX to 2g till Feb 2     #ATN   FeNa suggesting ATN likely secondary to sepsis   -Dialysis   -Avoid nephrotoxic agents     #Hagma 2/2 to Renal failure   -HD on 1/25.    Problem/Plan - 2:  ·  Problem: UTI (urinary tract infection).  Plan: Patient presenting with UTI causing sepsis   -CTX 2g daily.     Problem/Plan - 3:  ·  Problem: Rhabdomyolysis.  Plan: CK 18K with transaminitis and FACUNDO after being down for 8 hours now downtrending  - Status post IVF NS 200cc/hr   - Monitor EKG  - Trend CMP (LFTs Cr and CPK) q6hrs    #Transaminitis  US showed hepatomegaly and cholelithiasis likely in setting of hepatosteatosis  - Trend LFTs  - Holding statin and ACE.    Problem/Plan - 4:  ·  Problem: Asthma.  Plan: Dx after 9/11 due to the dust cloud mild wheezing on exam, pulm consulted  - Philippe PRN  - OOBTC   - Incentive spirometry.     Problem/Plan - 5:  ·  Problem: HLD (hyperlipidemia).  Plan: On Rosuvastatin 5mg QD  - Plan to hold in setting of transaminitis.     Problem/Plan - 6:  Problem: Bipolar disorder. Plan: On Abilify 20mg. Cooperative at baseline and well controlled.  - Plan to continue home regimen.    Problem/Plan - 7:  ·  Problem: Diabetes mellitus.  Plan: On Onglyza at home and Metformin A1c of 7.8  - mISS  - calculate need for Lantus dosing as per requirements.     Problem/Plan - 8:  ·  Problem: HTN (hypertension).  Plan: On lisinopril 40mg at home  - In setting of FACUNDO and low BP will hold for now.     Problem/Plan - 9:  ·  Problem: Lower extremity edema.  Plan: Reports starting Lasix for LE edema. No Dx of CHF.  - Holding in setting of Rhabdo and poor volume statis  - Echocardiogram  - Consder Cardiology consult based on Echo results.     Problem/Plan - 10:  Problem: Nutritional and metabolic disorders in diseases classified elsewhere. Plan; F: NS   E: On dialysis will replete with care   N: DASH/TLC Diabetes  DVT: Sub q heparin 7500  GI: None   Bowel Regimen: Not requiring at this moment   Dispo: F  CODE: Full Code.      
per Internal Medicine    71 yo F PMH Asthma (Dx after 9/11 attack) HTN, DM, HLD, L hip arthritis, Bipolar Disorder admitted for management of UTI s/p mechanical fall now with rhabdomyolysis.    Problem/Plan - 1:  ·  Problem: Sepsis.  Plan: Sepsis due to UTI now with bactermia. Cultures growing E. Coli   -CTX to 2g till Feb 2     #Transaminitis secondary to sepsis and Rhabdomyolysis   -Continue Ceftriaxone 2mg     #ATN   FeNa suggesting ATN likely secondary to sepsis   -Dialysis   -Avoid nephrotoxic agents     #Hagma 2/2 to Renal failure   -HD on 1/25  -Bicarb 650 daily.     Problem/Plan - 2:  ·  Problem: UTI (urinary tract infection).  Plan: Patient presenting with UTI causing sepsis   -CTX 2g daily.     Problem/Plan - 3:  ·  Problem: Acute kidney injury superimposed on CKD.  Plan: Patient with history of CKD and then further injured secondary to rhabdomyolysis.   -Nephrology following   -Dialysis as per renal none on 1/27   -500 cc NS bolus on 1/27    #Rhabdomyolysis CK 18K with transaminitis and FACUNDO after being down for 8 hours now downtrending.     Problem/Plan - 4:  ·  Problem: Asthma.  Plan: Dx after 9/11 due to the dust cloud mild wheezing on exam, pulm consulted  - Philippe PRN  - OOBTC   - Incentive spirometry.     Problem/Plan - 5:  ·  Problem: HLD (hyperlipidemia).  Plan: On Rosuvastatin 5mg QD  - Plan to hold in setting of transaminitis.     Problem/Plan - 6:  Problem: Bipolar disorder. Plan: On Abilify 20mg. Cooperative at baseline and well controlled.  - Plan to continue home regimen.    Problem/Plan - 7:  ·  Problem: Diabetes mellitus.  Plan: On Onglyza at home and Metformin A1c of 7.8  - mISS  - calculate need for Lantus dosing as per requirements.     Problem/Plan - 8:  ·  Problem: HTN (hypertension).  Plan: On lisinopril 40mg at home  - In setting of FACUNDO and low BP will hold for now.     Problem/Plan - 9:  ·  Problem: Lower extremity edema.  Plan: Reports starting Lasix for LE edema. No Dx of CHF.  - Holding in setting of Rhabdo and poor volume statis  - Echocardiogram  - Consder Cardiology consult based on Echo results.     Problem/Plan - 10:  Problem: Nutritional and metabolic disorders in diseases classified elsewhere. Plan; F: 500 cc bolus   E: On dialysis will replete with care   N: DASH/TLC Diabetes  DVT: Sub q heparin 7500  GI: None   Bowel Regimen: Not requiring at this moment   Dispo: RMF  CODE: Full Code.  
70F PMH Asthma (Dx after 9/11 attack) HTN, DM, HLD, L hip arthritis, Bipolar Disorder admitted for management of UTI s/p mechanical fall now with rhabdomyolysis.
70F PMH Asthma (Dx after 9/11 attack) HTN, DM, HLD, L hip arthritis, Bipolar Disorder admitted for management of UTI s/p mechanical fall now with rhabdomyolysis.
70F PMH Asthma (Dx after 9/11 attack) HTN, DM, HLD, L hip arthritis, Bipolar Disorder presented for evaluation of generalized weakness, malaise, urinary symptoms and mechanical fall - down for 8 hours found to have severe rhabdo with anuria. Renal consulted.    Pigment nephropathy ATN from rhabdo, non oliguric- recovery noted  Baseline creat- 0.6 in 10/20  S/p HDx1 11/25 for volume overload.  No indication for HD since; please remove HD cath    UrOut adequate- dc santillan  Can discontinue NaCl tabs; continue fluid restrict to 1.5L  BP: hypertensive; can start CCB- norvasc 5mg  Anaemia- check iron panel, ferritin  HAGMA from renal failure- on bicarb 650 BID to be titrated outpt as renal function improves  BMD: Phos noted- will resolve with improving renal function    Daily weights, strict I&Os, renally dose meds, renal diet, avoid nephrotoxic meds.
70F PMH Asthma (Dx after 9/11 attack) HTN, DM, HLD, L hip arthritis, Bipolar Disorder admitted for management of UTI s/p mechanical fall now with rhabdomyolysis.

## 2021-01-29 NOTE — PROGRESS NOTE ADULT - SUBJECTIVE AND OBJECTIVE BOX
Patient is a 70y Female seen and evaluated at bedside. UrOUt 1.6L can dc santillan cath. Improvement in renal function noted.     INCOMPLETE    Meds:    albuterol/ipratropium for Nebulization 3 every 4 hours  ARIPiprazole 20 daily  cefTRIAXone   IVPB 2000 every 24 hours  dextrose 40% Gel 15 once  dextrose 5%. 1000 <Continuous>  dextrose 50% Injectable 25 once  glucagon  Injectable 1 once  heparin   Injectable 7500 every 8 hours  insulin lispro (ADMELOG) corrective regimen sliding scale  Before meals and at bedtime  pantoprazole    Tablet 40 before breakfast  polyethylene glycol 3350 17 daily  senna 2 at bedtime  simethicone 80 daily PRN  sodium bicarbonate 650 every 12 hours  sodium chloride 1 three times a day      T(C): , Max: 36.9 (01-29-21 @ 05:57)  T(F): , Max: 98.4 (01-29-21 @ 05:57)  HR: 71 (01-29-21 @ 05:57)  BP: 148/95 (01-29-21 @ 05:57)  BP(mean): --  RR: 18 (01-29-21 @ 05:57)  SpO2: 94% (01-29-21 @ 05:57)  Wt(kg): --    01-28 @ 07:01 - 01-29 @ 07:00  --------------------------------------------------------  IN: 1460 mL / OUT: 1550 mL / NET: -90 mL    01-29 @ 07:01 - 01-29 @ 09:29  --------------------------------------------------------  IN: 0 mL / OUT: 1000 mL / NET: -1000 mL          Review of Systems:  CONSTITUTIONAL: No fever or chills, No fatigue or tiredness  RESPIRATORY: No shortness of breath, cough, hemoptysis  CARDIOVASCULAR: No chest pain or shortness of breath  GASTROINTESTINAL: No abdominal or flank pain, No nausea or vomiting, No diarrhea  GENITOURINARY: No dysuria or urinary burning, No difficulty passing urine, No hematuria  NEUROLOGICAL: No headaches or blurred vision  SKIN: No skin rashes   MUSCULOSKELETAL: No arthralgia, No leg edema, No muscle pain      PHYSICAL EXAM:  GENERAL: well-developed, well nourished, alert, no acute distress at present  NECK: supple, No JVD  CHEST/LUNG: Clear to auscultation bilaterally  HEART: normal S1S2, RRR  ABDOMEN: Soft, Nontender, +BS, No flank tenderness bilateral  EXTREMITIES: No clubbing, cyanosis, or edema   NEUROLOGY: AAO x3, no focal neurological deficit  ACCESS: good thrill and bruit appreciated      LABS:                        9.9    13.38 )-----------( 209      ( 29 Jan 2021 05:43 )             29.9     01-29    132<L>  |  99  |  64<H>  ----------------------------<  172<H>  3.9   |  18<L>  |  4.37<H>    Ca    9.1      29 Jan 2021 05:43  Phos  5.7     01-29  Mg     2.1     01-29    TPro  5.4<L>  /  Alb  2.2<L>  /  TBili  0.2  /  DBili  x   /  AST  65<H>  /  ALT  85<H>  /  AlkPhos  104  01-29                RADIOLOGY & ADDITIONAL STUDIES:           Patient is a 70y Female seen and evaluated at bedside. UrOUt 1.6L. Improvement in renal function noted. Remove HD cath and santillan cath. Plan for dc to [facility] tomorrow.    Meds:    albuterol/ipratropium for Nebulization 3 every 4 hours  ARIPiprazole 20 daily  cefTRIAXone   IVPB 2000 every 24 hours  dextrose 40% Gel 15 once  dextrose 5%. 1000 <Continuous>  dextrose 50% Injectable 25 once  glucagon  Injectable 1 once  heparin   Injectable 7500 every 8 hours  insulin lispro (ADMELOG) corrective regimen sliding scale  Before meals and at bedtime  pantoprazole    Tablet 40 before breakfast  polyethylene glycol 3350 17 daily  senna 2 at bedtime  simethicone 80 daily PRN  sodium bicarbonate 650 every 12 hours  sodium chloride 1 three times a day      T(C): , Max: 36.9 (01-29-21 @ 05:57)  T(F): , Max: 98.4 (01-29-21 @ 05:57)  HR: 71 (01-29-21 @ 05:57)  BP: 148/95 (01-29-21 @ 05:57)  BP(mean): --  RR: 18 (01-29-21 @ 05:57)  SpO2: 94% (01-29-21 @ 05:57)  Wt(kg): --    01-28 @ 07:01 - 01-29 @ 07:00  --------------------------------------------------------  IN: 1460 mL / OUT: 1550 mL / NET: -90 mL    01-29 @ 07:01 - 01-29 @ 09:29  --------------------------------------------------------  IN: 0 mL / OUT: 1000 mL / NET: -1000 mL    Review of Systems:  RESPIRATORY: No shortness of breath  CARDIOVASCULAR: No Chest pain  MUSCULOSKELETAL: No leg swelling    PHYSICAL EXAM:  GENERAL: Alert, awake, oriented x3 on RA, comfortable  CHEST/LUNG: Bilaterally CTA  HEART: Regular rate and rhythm, no murmur  ABDOMEN: Soft, nontender, non distended, obese  : santillan in place  EXTREMITIES: no pitting oedema  ACCESS: RIJ temp cath placed 1/25- clean dressing      LABS:                        9.9    13.38 )-----------( 209      ( 29 Jan 2021 05:43 )             29.9     01-29    132<L>  |  99  |  64<H>  ----------------------------<  172<H>  3.9   |  18<L>  |  4.37<H>    Ca    9.1      29 Jan 2021 05:43  Phos  5.7     01-29  Mg     2.1     01-29    TPro  5.4<L>  /  Alb  2.2<L>  /  TBili  0.2  /  DBili  x   /  AST  65<H>  /  ALT  85<H>  /  AlkPhos  104  01-29                RADIOLOGY & ADDITIONAL STUDIES:

## 2021-01-29 NOTE — DISCHARGE NOTE PROVIDER - HOSPITAL COURSE
#Discharge: do not delete    Patient is __ yo M/F with past medical history of _____  Presented with _____, found to have _____  Problem List/Main Diagnoses (system-based):   Inpatient treatment course:   New medications:   Labs to be followed outpatient:   Exam to be followed outpatient:    #Discharge: do not delete    Patient is a 71 yo F with past medical history of  Asthma (Dx after 9/11 attack) HTN, DM, HLD, L hip arthritis, Bipolar Disorder presented for evaluation of generalized weakness, malaise, urinary symptoms and mechanical fall   Presented with generalized weakness, malaise, urinary symptoms and mechanical fall , found to have a urinary tract infection.     Problem List/Main Diagnoses (system-based):     Sepsis  #Sepsis. Sepsis due to UTI now complicated by bacteremia.   Cultures growing E. Coli   -CTX to 2g till Feb 2     #Transaminitis secondary to sepsis and Rhabdomyolysis   -Continue Ceftriaxone 2mg   -Fluids  - Trend LFTs    #ATN   FeNa suggesting ATN likely secondary to sepsis   requiring dialysis on 1/25   -Avoid nephrotoxic agents     #Hagma 2/2 to Renal failure   -HD on 1/25  -Bicarb 650 daily    #Hyponatremia   Start NaCl tabs 1g TID for hypoNa; fluid restrict to 1.5L.     #UTI (urinary tract infection).     Patient presenting with UTI causing sepsis   -CTX 2g daily.     #Acute kidney injury superimposed on CKD.  Plan: Patient with history of CKD and then further injured secondary to rhabdomyolysis.   -Nephrology following   -Dialysis only required on 1/25  -500 cc NS bolus  -Daily weights, I/Os    #Rhabdomyolysis CK 18K with transaminitis and FACUNDO after being down for 8 hours   -Given fluids     #Asthma Dx after 9/11 due to the dust cloud mild wheezing on exam, pulm consulted  - Philippe PRN  - OOBTC   - Incentive spirometry.     #HLD (hyperlipidemia).   On Rosuvastatin 5mg QD  - Held in setting of transaminitis.     #Bipolar disorder.   On Abilify 20mg. Cooperative at baseline and well controlled.  - Continued on Abilify     #Diabetes mellitus.    On Onglyza at home and Metformin A1c of 7.8  - mISS    #HTN (hypertension).    On lisinopril 40mg at home  - In setting of FACUNDO and low BP lisinopril held    #Lower extremity edema.    Reports starting Lasix for LE edema. No Dx of CHF.  - Holding in setting of Rhabdo and poor volume statis.       Inpatient treatment course: While in the ED the patients vital signs were  VS T98.3 -102.4 BP 92/61 HR 95 R16 94% on RA, Labs: WBC 22 | Hg/Hct  13/40 | Plt 197. Urine analysis showed evidence of a UTI and the patient was started on ceftriaxone. Creatinine kinase was elevated to 18K and patient given 2L NS and started on maintence fluids. On second day of admission patient found to have worsening renal function, metabolic acidosis, and elevated anion gap.  The patient became oliguric, and nephrology was consulted.  Patient received one session of hemodialysis on 1/25.  On 1/25 patient's blood cultures began to grow gram negative rods and the patient was started on ceftriaxone. Bacterial cultures revealed E.Coli. On 1/26 the patient was begun on sodium bicarb due to HAGMA. 1/28 patient was hyponatermic and started on sodium chloride tabs and was volume restricted. Patient urine had changed from dark siomara to clear yellow over admission creatinine downtrended, santillan was removed, patient was considered stable enough for discharge on 1/29.     New medications: Keflex   Labs to be followed outpatient: None   Exam to be followed outpatient: None   #Discharge: do not delete    Patient is a 69 yo F with past medical history of  Asthma (Dx after 9/11 attack) HTN, DM, HLD, L hip arthritis, Bipolar Disorder presented for evaluation of generalized weakness, malaise, urinary symptoms and mechanical fall   Presented with generalized weakness, malaise, urinary symptoms and mechanical fall , found to have a urinary tract infection.     Problem List/Main Diagnoses (system-based):     Sepsis  #Sepsis. Sepsis due to UTI now complicated by bacteremia.   Cultures growing E. Coli   -CTX to 2g till Feb 2     #Transaminitis secondary to sepsis and Rhabdomyolysis   -Continue Ceftriaxone 2mg   -Fluids  - Trend LFTs    #ATN   FeNa suggesting ATN likely secondary to sepsis   requiring dialysis on 1/25   -Avoid nephrotoxic agents     #Hagma 2/2 to Renal failure   -HD on 1/25  -Bicarb 650 daily    #Hyponatremia   Start NaCl tabs 1g TID for hypoNa; fluid restrict to 1.5L.     #UTI (urinary tract infection).     Patient presenting with UTI causing sepsis   -CTX 2g daily.     #Acute kidney injury superimposed on CKD.  Plan: Patient with history of CKD and then further injured secondary to rhabdomyolysis.   -Nephrology following   -Dialysis only required on 1/25  -500 cc NS bolus  -Daily weights, I/Os    #Rhabdomyolysis CK 18K with transaminitis and FACUNDO after being down for 8 hours   -Given fluids      #Asthma after 9/11 due to the dust cloud mild wheezing on exam, pulm consulted  - Philippe PRN  - OOBTC   - Incentive spirometry.     #HLD (hyperlipidemia).   On Rosuvastatin 5mg QD  - Held in setting of transaminitis.     #Bipolar disorder.   On Abilify 20mg. Cooperative at baseline and well controlled.  - Continued on Abilify     #Diabetes mellitus.    On Onglyza at home and Metformin A1c of 7.8  - mISS    #HTN (hypertension).    On lisinopril 40mg at home  - In setting of FACUNDO and low BP lisinopril held    #Lower extremity edema.    Reports starting Lasix for LE edema. No Dx of CHF.  - Holding in setting of Rhabdo and poor volume statis.       Inpatient treatment course: While in the ED the patients vital signs were  VS T98.3 -102.4 BP 92/61 HR 95 R16 94% on RA, Labs: WBC 22 | Hg/Hct  13/40 | Plt 197. Urine analysis showed evidence of a UTI and the patient was started on ceftriaxone. Creatinine kinase was elevated to 18K and patient given 2L NS and started on maintence fluids. On second day of admission patient found to have worsening renal function, metabolic acidosis, and elevated anion gap.  The patient became oliguric, and nephrology was consulted.  Patient received one session of hemodialysis on 1/25.  On 1/25 patient's blood cultures began to grow gram negative rods and the patient was started on ceftriaxone. Bacterial cultures revealed E.Coli. On 1/26 the patient was begun on sodium bicarb due to HAGMA. 1/28 patient was hyponatermic and started on sodium chloride tabs and was volume restricted. Patient urine had changed from dark siomara to clear yellow over admission creatinine downtrended, santillan was removed, patient was considered stable enough for discharge on 1/29.     New medications: Keflex   Labs to be followed outpatient: None   Exam to be followed outpatient: None

## 2021-01-29 NOTE — DISCHARGE NOTE PROVIDER - CARE PROVIDERS DIRECT ADDRESSES
[email or web address]

## 2021-01-29 NOTE — DISCHARGE NOTE PROVIDER - NSDCMRMEDTOKEN_GEN_ALL_CORE_FT
ARIPiprazole 20 mg oral tablet: 1 tab(s) orally once a day  insulin lispro 100 units/mL injectable solution: Sliding scale coverage three times a day with meals and before bedtime   Sugar 150-200: 2 units  201-250: 4 units   251-300: 6 units  301-350: 8 units  ipratropium-albuterol 0.5 mg-2.5 mg/3 mLinhalation solution: 3 milliliter(s) inhaled every 4 hours  pantoprazole 40 mg oral delayed release tablet: 1 tab(s) orally once a day (before a meal)  rosuvastatin 5 mg oral tablet: 1 tab(s) orally once a day  simethicone 80 mg oral tablet, chewable: 1 tab(s) orally once a day, As needed, Gas  sodium bicarbonate 650 mg oral tablet: 1 tab(s) orally every 12 hours

## 2021-01-31 LAB
CULTURE RESULTS: SIGNIFICANT CHANGE UP
SPECIMEN SOURCE: SIGNIFICANT CHANGE UP

## 2021-02-03 ENCOUNTER — NON-APPOINTMENT (OUTPATIENT)
Age: 71
End: 2021-02-03

## 2021-02-04 DIAGNOSIS — E11.22 TYPE 2 DIABETES MELLITUS WITH DIABETIC CHRONIC KIDNEY DISEASE: ICD-10-CM

## 2021-02-04 DIAGNOSIS — R06.03 ACUTE RESPIRATORY DISTRESS: ICD-10-CM

## 2021-02-04 DIAGNOSIS — K80.20 CALCULUS OF GALLBLADDER WITHOUT CHOLECYSTITIS WITHOUT OBSTRUCTION: ICD-10-CM

## 2021-02-04 DIAGNOSIS — R65.20 SEVERE SEPSIS WITHOUT SEPTIC SHOCK: ICD-10-CM

## 2021-02-04 DIAGNOSIS — E87.1 HYPO-OSMOLALITY AND HYPONATREMIA: ICD-10-CM

## 2021-02-04 DIAGNOSIS — Z79.84 LONG TERM (CURRENT) USE OF ORAL HYPOGLYCEMIC DRUGS: ICD-10-CM

## 2021-02-04 DIAGNOSIS — N39.0 URINARY TRACT INFECTION, SITE NOT SPECIFIED: ICD-10-CM

## 2021-02-04 DIAGNOSIS — A41.51 SEPSIS DUE TO ESCHERICHIA COLI [E. COLI]: ICD-10-CM
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
## 2021-02-04 DIAGNOSIS — E87.70 FLUID OVERLOAD, UNSPECIFIED: ICD-10-CM

## 2021-02-04 DIAGNOSIS — M62.82 RHABDOMYOLYSIS: ICD-10-CM

## 2021-02-04 DIAGNOSIS — E87.2 ACIDOSIS: ICD-10-CM
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
## 2021-02-04 DIAGNOSIS — N17.0 ACUTE KIDNEY FAILURE WITH TUBULAR NECROSIS: ICD-10-CM

## 2021-02-04 DIAGNOSIS — Z87.891 PERSONAL HISTORY OF NICOTINE DEPENDENCE: ICD-10-CM

## 2021-02-04 DIAGNOSIS — E66.01 MORBID (SEVERE) OBESITY DUE TO EXCESS CALORIES: ICD-10-CM

## 2021-02-04 DIAGNOSIS — S80.212A ABRASION, LEFT KNEE, INITIAL ENCOUNTER: ICD-10-CM

## 2021-02-04 DIAGNOSIS — S50.312A ABRASION OF LEFT ELBOW, INITIAL ENCOUNTER: ICD-10-CM

## 2021-02-04 DIAGNOSIS — N18.9 CHRONIC KIDNEY DISEASE, UNSPECIFIED: ICD-10-CM

## 2021-02-04 DIAGNOSIS — M16.12 UNILATERAL PRIMARY OSTEOARTHRITIS, LEFT HIP: ICD-10-CM

## 2021-02-04 DIAGNOSIS — D64.9 ANEMIA, UNSPECIFIED: ICD-10-CM

## 2021-02-04 DIAGNOSIS — D63.1 ANEMIA IN CHRONIC KIDNEY DISEASE: ICD-10-CM

## 2021-02-04 DIAGNOSIS — F31.9 BIPOLAR DISORDER, UNSPECIFIED: ICD-10-CM

## 2021-02-04 DIAGNOSIS — I12.9 HYPERTENSIVE CHRONIC KIDNEY DISEASE WITH STAGE 1 THROUGH STAGE 4 CHRONIC KIDNEY DISEASE, OR UNSPECIFIED CHRONIC KIDNEY DISEASE: ICD-10-CM

## 2021-02-04 DIAGNOSIS — J45.909 UNSPECIFIED ASTHMA, UNCOMPLICATED: ICD-10-CM

## 2021-02-04 DIAGNOSIS — E78.5 HYPERLIPIDEMIA, UNSPECIFIED: ICD-10-CM

## 2021-02-04 DIAGNOSIS — K76.0 FATTY (CHANGE OF) LIVER, NOT ELSEWHERE CLASSIFIED: ICD-10-CM

## 2021-02-16 ENCOUNTER — APPOINTMENT (OUTPATIENT)
Dept: INTERNAL MEDICINE | Facility: CLINIC | Age: 71
End: 2021-02-16

## 2021-02-21 ENCOUNTER — INPATIENT (INPATIENT)
Facility: HOSPITAL | Age: 71
LOS: 10 days | Discharge: NURSING, REHAB OR LONG-TERM CARE | DRG: 871 | End: 2021-03-04
Attending: FAMILY MEDICINE | Admitting: STUDENT IN AN ORGANIZED HEALTH CARE EDUCATION/TRAINING PROGRAM
Payer: COMMERCIAL

## 2021-02-21 VITALS
OXYGEN SATURATION: 85 % | WEIGHT: 205.91 LBS | HEART RATE: 86 BPM | TEMPERATURE: 99 F | RESPIRATION RATE: 22 BRPM | DIASTOLIC BLOOD PRESSURE: 80 MMHG | SYSTOLIC BLOOD PRESSURE: 113 MMHG | HEIGHT: 63 IN

## 2021-02-21 DIAGNOSIS — I10 ESSENTIAL (PRIMARY) HYPERTENSION: ICD-10-CM

## 2021-02-21 DIAGNOSIS — U07.1 COVID-19: ICD-10-CM

## 2021-02-21 DIAGNOSIS — F31.9 BIPOLAR DISORDER, UNSPECIFIED: ICD-10-CM

## 2021-02-21 DIAGNOSIS — M16.12 UNILATERAL PRIMARY OSTEOARTHRITIS, LEFT HIP: ICD-10-CM

## 2021-02-21 DIAGNOSIS — R63.8 OTHER SYMPTOMS AND SIGNS CONCERNING FOOD AND FLUID INTAKE: ICD-10-CM

## 2021-02-21 DIAGNOSIS — J96.01 ACUTE RESPIRATORY FAILURE WITH HYPOXIA: ICD-10-CM

## 2021-02-21 DIAGNOSIS — A41.9 SEPSIS, UNSPECIFIED ORGANISM: ICD-10-CM

## 2021-02-21 DIAGNOSIS — E11.9 TYPE 2 DIABETES MELLITUS WITHOUT COMPLICATIONS: ICD-10-CM

## 2021-02-21 DIAGNOSIS — J45.909 UNSPECIFIED ASTHMA, UNCOMPLICATED: ICD-10-CM

## 2021-02-21 DIAGNOSIS — D64.9 ANEMIA, UNSPECIFIED: ICD-10-CM

## 2021-02-21 DIAGNOSIS — E78.5 HYPERLIPIDEMIA, UNSPECIFIED: ICD-10-CM

## 2021-02-21 LAB
ALBUMIN SERPL ELPH-MCNC: 3.1 G/DL — LOW (ref 3.3–5)
ALP SERPL-CCNC: 67 U/L — SIGNIFICANT CHANGE UP (ref 40–120)
ALT FLD-CCNC: 13 U/L — SIGNIFICANT CHANGE UP (ref 10–45)
ANION GAP SERPL CALC-SCNC: 10 MMOL/L — SIGNIFICANT CHANGE UP (ref 5–17)
APTT BLD: 32.4 SEC — SIGNIFICANT CHANGE UP (ref 27.5–35.5)
AST SERPL-CCNC: 19 U/L — SIGNIFICANT CHANGE UP (ref 10–40)
BASE EXCESS BLDV CALC-SCNC: 5.8 MMOL/L — SIGNIFICANT CHANGE UP
BASOPHILS # BLD AUTO: 0.02 K/UL — SIGNIFICANT CHANGE UP (ref 0–0.2)
BASOPHILS NFR BLD AUTO: 0.2 % — SIGNIFICANT CHANGE UP (ref 0–2)
BILIRUB SERPL-MCNC: 0.4 MG/DL — SIGNIFICANT CHANGE UP (ref 0.2–1.2)
BUN SERPL-MCNC: 15 MG/DL — SIGNIFICANT CHANGE UP (ref 7–23)
CALCIUM SERPL-MCNC: 8.7 MG/DL — SIGNIFICANT CHANGE UP (ref 8.4–10.5)
CHLORIDE SERPL-SCNC: 95 MMOL/L — LOW (ref 96–108)
CO2 SERPL-SCNC: 30 MMOL/L — SIGNIFICANT CHANGE UP (ref 22–31)
CREAT SERPL-MCNC: 0.66 MG/DL — SIGNIFICANT CHANGE UP (ref 0.5–1.3)
EOSINOPHIL # BLD AUTO: 0 K/UL — SIGNIFICANT CHANGE UP (ref 0–0.5)
EOSINOPHIL NFR BLD AUTO: 0 % — SIGNIFICANT CHANGE UP (ref 0–6)
GAS PNL BLDV: SIGNIFICANT CHANGE UP
GLUCOSE SERPL-MCNC: 169 MG/DL — HIGH (ref 70–99)
HCO3 BLDV-SCNC: 30 MMOL/L — HIGH (ref 20–27)
HCT VFR BLD CALC: 28.3 % — LOW (ref 34.5–45)
HGB BLD-MCNC: 9.2 G/DL — LOW (ref 11.5–15.5)
IMM GRANULOCYTES NFR BLD AUTO: 1.8 % — HIGH (ref 0–1.5)
INR BLD: 1.75 — HIGH (ref 0.88–1.16)
LACTATE SERPL-SCNC: 1.3 MMOL/L — SIGNIFICANT CHANGE UP (ref 0.5–2)
LYMPHOCYTES # BLD AUTO: 0.58 K/UL — LOW (ref 1–3.3)
LYMPHOCYTES # BLD AUTO: 4.7 % — LOW (ref 13–44)
MCHC RBC-ENTMCNC: 28 PG — SIGNIFICANT CHANGE UP (ref 27–34)
MCHC RBC-ENTMCNC: 32.5 GM/DL — SIGNIFICANT CHANGE UP (ref 32–36)
MCV RBC AUTO: 86.3 FL — SIGNIFICANT CHANGE UP (ref 80–100)
MONOCYTES # BLD AUTO: 0.9 K/UL — SIGNIFICANT CHANGE UP (ref 0–0.9)
MONOCYTES NFR BLD AUTO: 7.2 % — SIGNIFICANT CHANGE UP (ref 2–14)
NEUTROPHILS # BLD AUTO: 10.73 K/UL — HIGH (ref 1.8–7.4)
NEUTROPHILS NFR BLD AUTO: 86.1 % — HIGH (ref 43–77)
NRBC # BLD: 0 /100 WBCS — SIGNIFICANT CHANGE UP (ref 0–0)
PCO2 BLDV: 40 MMHG — LOW (ref 41–51)
PH BLDV: 7.49 — HIGH (ref 7.32–7.43)
PLATELET # BLD AUTO: 266 K/UL — SIGNIFICANT CHANGE UP (ref 150–400)
PO2 BLDV: 44 MMHG — SIGNIFICANT CHANGE UP
POTASSIUM SERPL-MCNC: 3.5 MMOL/L — SIGNIFICANT CHANGE UP (ref 3.5–5.3)
POTASSIUM SERPL-SCNC: 3.5 MMOL/L — SIGNIFICANT CHANGE UP (ref 3.5–5.3)
PROT SERPL-MCNC: 6.3 G/DL — SIGNIFICANT CHANGE UP (ref 6–8.3)
PROTHROM AB SERPL-ACNC: 20.4 SEC — HIGH (ref 10.6–13.6)
RBC # BLD: 3.28 M/UL — LOW (ref 3.8–5.2)
RBC # FLD: 13.1 % — SIGNIFICANT CHANGE UP (ref 10.3–14.5)
SAO2 % BLDV: 82 % — SIGNIFICANT CHANGE UP
SARS-COV-2 RNA SPEC QL NAA+PROBE: DETECTED
SODIUM SERPL-SCNC: 135 MMOL/L — SIGNIFICANT CHANGE UP (ref 135–145)
WBC # BLD: 12.46 K/UL — HIGH (ref 3.8–10.5)
WBC # FLD AUTO: 12.46 K/UL — HIGH (ref 3.8–10.5)

## 2021-02-21 PROCEDURE — 93010 ELECTROCARDIOGRAM REPORT: CPT

## 2021-02-21 PROCEDURE — 99223 1ST HOSP IP/OBS HIGH 75: CPT | Mod: GC

## 2021-02-21 PROCEDURE — 99285 EMERGENCY DEPT VISIT HI MDM: CPT

## 2021-02-21 PROCEDURE — 71045 X-RAY EXAM CHEST 1 VIEW: CPT | Mod: 26

## 2021-02-21 RX ORDER — ARIPIPRAZOLE 15 MG/1
20 TABLET ORAL DAILY
Refills: 0 | Status: DISCONTINUED | OUTPATIENT
Start: 2021-02-21 | End: 2021-03-04

## 2021-02-21 RX ORDER — TIOTROPIUM BROMIDE 18 UG/1
1 CAPSULE ORAL; RESPIRATORY (INHALATION) DAILY
Refills: 0 | Status: DISCONTINUED | OUTPATIENT
Start: 2021-02-21 | End: 2021-03-04

## 2021-02-21 RX ORDER — SIMVASTATIN 20 MG/1
1 TABLET, FILM COATED ORAL
Qty: 0 | Refills: 0 | DISCHARGE

## 2021-02-21 RX ORDER — FUROSEMIDE 40 MG
40 TABLET ORAL ONCE
Refills: 0 | Status: COMPLETED | OUTPATIENT
Start: 2021-02-21 | End: 2021-02-21

## 2021-02-21 RX ORDER — REMDESIVIR 5 MG/ML
100 INJECTION INTRAVENOUS EVERY 24 HOURS
Refills: 0 | Status: COMPLETED | OUTPATIENT
Start: 2021-02-22 | End: 2021-02-25

## 2021-02-21 RX ORDER — DEXAMETHASONE 0.5 MG/5ML
6 ELIXIR ORAL DAILY
Refills: 0 | Status: COMPLETED | OUTPATIENT
Start: 2021-02-21 | End: 2021-02-25

## 2021-02-21 RX ORDER — ENOXAPARIN SODIUM 100 MG/ML
40 INJECTION SUBCUTANEOUS EVERY 12 HOURS
Refills: 0 | Status: DISCONTINUED | OUTPATIENT
Start: 2021-02-21 | End: 2021-03-04

## 2021-02-21 RX ORDER — ROSUVASTATIN CALCIUM 5 MG/1
1 TABLET ORAL
Qty: 0 | Refills: 0 | DISCHARGE

## 2021-02-21 RX ORDER — ACETAMINOPHEN 500 MG
650 TABLET ORAL ONCE
Refills: 0 | Status: COMPLETED | OUTPATIENT
Start: 2021-02-21 | End: 2021-02-21

## 2021-02-21 RX ORDER — ALBUTEROL 90 UG/1
2 AEROSOL, METERED ORAL EVERY 6 HOURS
Refills: 0 | Status: DISCONTINUED | OUTPATIENT
Start: 2021-02-21 | End: 2021-02-22

## 2021-02-21 RX ORDER — SIMETHICONE 80 MG/1
80 TABLET, CHEWABLE ORAL DAILY
Refills: 0 | Status: DISCONTINUED | OUTPATIENT
Start: 2021-02-21 | End: 2021-03-04

## 2021-02-21 RX ORDER — SIMVASTATIN 20 MG/1
10 TABLET, FILM COATED ORAL AT BEDTIME
Refills: 0 | Status: DISCONTINUED | OUTPATIENT
Start: 2021-02-21 | End: 2021-03-04

## 2021-02-21 RX ORDER — OMEPRAZOLE 10 MG/1
1 CAPSULE, DELAYED RELEASE ORAL
Qty: 0 | Refills: 0 | DISCHARGE

## 2021-02-21 RX ORDER — REMDESIVIR 5 MG/ML
INJECTION INTRAVENOUS
Refills: 0 | Status: COMPLETED | OUTPATIENT
Start: 2021-02-21 | End: 2021-02-25

## 2021-02-21 RX ORDER — REMDESIVIR 5 MG/ML
200 INJECTION INTRAVENOUS EVERY 24 HOURS
Refills: 0 | Status: COMPLETED | OUTPATIENT
Start: 2021-02-21 | End: 2021-02-21

## 2021-02-21 RX ORDER — PANTOPRAZOLE SODIUM 20 MG/1
40 TABLET, DELAYED RELEASE ORAL
Refills: 0 | Status: DISCONTINUED | OUTPATIENT
Start: 2021-02-21 | End: 2021-03-04

## 2021-02-21 RX ADMIN — Medication 40 MILLIGRAM(S): at 19:29

## 2021-02-21 RX ADMIN — ENOXAPARIN SODIUM 40 MILLIGRAM(S): 100 INJECTION SUBCUTANEOUS at 19:32

## 2021-02-21 RX ADMIN — SIMVASTATIN 10 MILLIGRAM(S): 20 TABLET, FILM COATED ORAL at 23:47

## 2021-02-21 RX ADMIN — Medication 6 MILLIGRAM(S): at 19:30

## 2021-02-21 RX ADMIN — ALBUTEROL 2 PUFF(S): 90 AEROSOL, METERED ORAL at 23:47

## 2021-02-21 RX ADMIN — Medication 650 MILLIGRAM(S): at 12:20

## 2021-02-21 RX ADMIN — REMDESIVIR 500 MILLIGRAM(S): 5 INJECTION INTRAVENOUS at 19:30

## 2021-02-21 NOTE — CONSULT NOTE ADULT - ASSESSMENT
71 yo F with past medical history of  Asthma (Dx after 9/11 attack) HTN, DM, HLD, L hip arthritis, Bipolar Disorder recently admitted for UTI with E. Coli bacteremia that presents from UES due to hypoxia.     #Acute Hypoxic Respiratory Failure  69 yo F with past medical history of  Asthma (Dx after 9/11 attack) HTN, DM, HLD, L hip arthritis, Bipolar Disorder recently admitted for UTI with E. Coli bacteremia that presents from UES due to hypoxia. ICU consulted for acute hypoxic respiratory failure    #Acute Hypoxic Respiratory Failure   Respiratory failure likely 2/2 to viral process with high suspicion for COVID-19  Intitally on NRB saturating 97% was able to be weaned down to 2LNC saturating 92%. Currently in NAD, no accessory muscle use, no nasal flaring, able to speak in full sentences.   - wean supplemental oxygen as tolerated  - please ensure patient does incentive spirometry during her hospital stay    #Pneumonia 2/2 to COVID PNA  Labs (d-dimer, ferritin, crp ) elevated with CXR with bilateral infiltrates coming from [facility].   - wean supplemental oxygen as tolerated  - Isolation precautions; contact and airborne  - trend COVID-19 labs; if required  - Monitor O2 saturation, monitor for respiratory distress  - albuterol MDI; avoid duonebs if needed  - c/w Decadron 6mg for 10 day course  - initiate Remdesevir  (will need approval)    Plan discussed with Fellow and attending    71 yo F with past medical history of  Asthma (Dx after 9/11 attack) HTN, DM, HLD, L hip arthritis, Bipolar Disorder recently admitted for UTI with E. Coli bacteremia that presents from UES due to hypoxia. ICU consulted for acute hypoxic respiratory failure    #Acute Hypoxic Respiratory Failure   Respiratory failure likely 2/2 to viral process with high suspicion for COVID-19  Intitally on NRB saturating 97% was able to be weaned down to 2LNC saturating 92%. Currently in NAD, no accessory muscle use, no nasal flaring, able to speak in full sentences.   - wean supplemental oxygen as tolerated  - please ensure patient does incentive spirometry during her hospital stay    #Pneumonia 2/2 to COVID PNA  Labs (d-dimer, ferritin, crp ) elevated with CXR demonstrating bilateral infiltrates; coming from [facility]. Strong suspicion for COVID-19.   - f/u swab  - wean supplemental oxygen as tolerated  - Isolation precautions; contact and airborne  - trend COVID-19 labs; if required  - Monitor O2 saturation, monitor for respiratory distress  - albuterol MDI; avoid duonebs if needed  - c/w Decadron 6mg for 10 day course  - initiate Remdesevir  (will need approval)    Plan discussed with Fellow and attending    69 yo F with past medical history of  Asthma (Dx after 9/11 attack) HTN, DM, HLD, L hip arthritis, Bipolar Disorder recently admitted for UTI with E. Coli bacteremia that presents from UES due to hypoxia. ICU consulted for acute hypoxic respiratory failure    #Acute Hypoxic Respiratory Failure   Respiratory failure likely 2/2 to viral process with high suspicion for COVID-19  Intitally on NRB saturating 97% was able to be weaned down to 2LNC saturating 92%. Currently in NAD, no accessory muscle use, no nasal flaring, able to speak in full sentences.   - wean supplemental oxygen as tolerated  - please ensure patient does incentive spirometry during her hospital stay  - stable for [facility]  - please re-consult for any acute changes     #Pneumonia 2/2 to COVID PNA  Labs (d-dimer, ferritin, crp ) elevated with CXR demonstrating bilateral infiltrates; coming from [facility]. Strong suspicion for COVID-19.   - f/u swab  - wean supplemental oxygen as tolerated  - Isolation precautions; contact and airborne  - trend COVID-19 labs; if required  - Monitor O2 saturation, monitor for respiratory distress  - albuterol MDI; avoid duonebs if needed  - c/w Decadron 6mg for 10 day course  - initiate Remdesevir  (will need approval)    Plan discussed with Fellow and attending

## 2021-02-21 NOTE — H&P ADULT - PROBLEM SELECTOR PLAN 5
-Continue home ... -On lisinopril 40mg at home (though more for leg swelling)  - Was Dc'd on previous [facility]os[facility] adm i/s/o low BP and FACUNDO  - May continue on this adm, renal function recovered    #Lower extremity edema.    Reports starting Lasix for LE edema. No Dx of CHF.  - Was held on previous admission and discharge as mentioned above    #HLD  -Continue rosuvastatin 5mg QD home med -On lisinopril 40mg at home (though more for leg swelling)  - Was Dc'd on previous [facility] hosptial adm i/s/o low BP and FACUNDO  - May continue on this adm, renal function recovered    #Lower extremity edema.    Reports starting Lasix for LE edema. No Dx of CHF.  - Was held on previous admission and discharge as mentioned above    #HLD  -Continue simvastatin 10 mg QD home med - On lisinopril 40mg at home (though more for leg swelling)  - Was Dc'd on previous january hospital adm i/s/o low BP and FACUNDO  - May continue on this adm, renal function recovered    #Lower extremity edema.    Reports starting Lasix for LE edema. No Dx of CHF.  - Was held on previous admission and discharge as mentioned above    #HLD  -Continue simvastatin 10 mg QD home med

## 2021-02-21 NOTE — H&P ADULT - PROBLEM SELECTOR PLAN 8
-Continue home - On Onglyza at home and Metformin A1c of 7.8  - mISS    #Reflux/Abdominal Gas  -Continue home pantoprazole 40 mg QD and simethicone - On Onglyza at home and Metformin A1c of 7.8  - mISS    #Reflux/Abdominal Gas  -Continue from rehab, prilosec 20 mg QD; continue probiotic; continue simethicone 80 mg qD PRN for gas

## 2021-02-21 NOTE — H&P ADULT - PROBLEM SELECTOR PLAN 4
-Continue home medications -Symptomatic starting around a week ago. Positive test 2/21  -Initially hypoxic on RA, meeting criteria for remdesivir and dexamethasone, will give remdesivir pending CC approval and continue dexamethasone at 6 mg (previously given 8 mg for 5 days, starting 2/16) -Symptomatic starting around a week ago. Positive test 2/21  -Initially hypoxic on RA, meeting criteria for remdesivir and dexamethasone, will give remdesivir pending CC approval and continue dexamethasone at 6 mg for 10 day course (previously given 8 mg for 5 days, starting 2/16)

## 2021-02-21 NOTE — H&P ADULT - NSHPPHYSICALEXAM_GEN_ALL_CORE
VITAL SIGNS:  T(C): 36.7 (02-21-21 @ 14:03), Max: 38.2 (02-21-21 @ 12:00)  T(F): 98.1 (02-21-21 @ 14:03), Max: 100.8 (02-21-21 @ 12:00)  HR: 88 (02-21-21 @ 14:03) (85 - 88)  BP: 166/73 (02-21-21 @ 14:03) (113/80 - 166/73)  BP(mean): --  RR: 20 (02-21-21 @ 14:03) (20 - 22)  SpO2: 92% (02-21-21 @ 14:03) (85% - 96%)  Wt(kg): --    PHYSICAL EXAM:    Constitutional: WDWN resting comfortably in bed; NAD  Head: NC/AT  Eyes: PERRL, EOMI, anicteric sclera  ENT: no nasal discharge; uvula midline, no oropharyngeal erythema or exudates; MMM  Neck: supple; no JVD or thyromegaly  Respiratory: CTA B/L; no W/R/R, no retractions  Cardiac: +S1/S2; RRR; no M/R/G; PMI non-displaced  Gastrointestinal: abdomen soft, NT/ND; no rebound or guarding; +BSx4  Back: spine midline, no bony tenderness or step-offs; no CVAT B/L  Extremities: WWP, no clubbing or cyanosis; no peripheral edema  Musculoskeletal: NROM x4; no joint swelling, tenderness or erythema  Vascular: 2+ radial, femoral, DP/PT pulses B/L  Dermatologic: skin warm, dry and intact; no rashes, wounds, or scars  Lymphatic: no submandibular or cervical LAD  Neurologic: AAOx3; CNII-XII grossly intact; no focal deficits  Psychiatric: affect and characteristics of appearance, verbalizations, behaviors are appropriate VITAL SIGNS:  T(C): 36.7 (02-21-21 @ 14:03), Max: 38.2 (02-21-21 @ 12:00)  T(F): 98.1 (02-21-21 @ 14:03), Max: 100.8 (02-21-21 @ 12:00)  HR: 88 (02-21-21 @ 14:03) (85 - 88)  BP: 166/73 (02-21-21 @ 14:03) (113/80 - 166/73)  BP(mean): --  RR: 20 (02-21-21 @ 14:03) (20 - 22)  SpO2: 92% (02-21-21 @ 14:03) (85% - 96%)  Wt(kg): --    PHYSICAL EXAM:  Constitutional: Obese, WDWN resting comfortably in bed on 4LNC; NAD  Head: NC/AT  Eyes: PERRL, EOMI, anicteric sclera, no conjunctival pallor   ENT: no nasal discharge; uvula midline, no oropharyngeal erythema or exudates; MM are dry  Neck: supple; no JVD or thyromegaly  Respiratory: Decreased BS throughout due to body habitus, bibasilar crackles; no W/R/R, no retractions  Cardiac: +S1/S2; RRR; no M/R/G; PMI non-displaced  Gastrointestinal: Large abdomen, slightly distended, soft, NT/ND; no rebound or guarding; +BSx4  Back: spine midline, no bony tenderness or step-offs  Extremities: WWP, no clubbing or cyanosis; peripheral edema none-to-trace in b/l LE  Musculoskeletal: NROM x4; no joint swelling, tenderness or erythema  Dermatologic: skin warm, dry and intact; no rashes, wounds, or scars  Lymphatic: no submandibular or cervical LAD  Neurologic: AAOx3; CNII-XII grossly intact; no focal deficits  Psychiatric: affect and characteristics of appearance, verbalizations, behaviors are appropriate VITAL SIGNS:  T(C): 36.7 (02-21-21 @ 14:03), Max: 38.2 (02-21-21 @ 12:00)  T(F): 98.1 (02-21-21 @ 14:03), Max: 100.8 (02-21-21 @ 12:00)  HR: 88 (02-21-21 @ 14:03) (85 - 88)  BP: 166/73 (02-21-21 @ 14:03) (113/80 - 166/73)  BP(mean): --  RR: 20 (02-21-21 @ 14:03) (20 - 22)  SpO2: 92% (02-21-21 @ 14:03) (85% - 96%)  Wt(kg): --    PHYSICAL EXAM:  Constitutional: Obese, WDWN resting comfortably in bed on 4LNC; NAD  Head: NC/AT  Eyes: PERRL, EOMI, anicteric sclera, no conjunctival pallor   ENT: no nasal discharge; uvula midline, no oropharyngeal erythema or exudates; MM are dry  Neck: supple; no JVD or thyromegaly  Respiratory: Decreased BS throughout due to body habitus, bibasilar crackles; no W/R/R, no retractions  Cardiac: +S1/S2; RRR; no M/R/G; PMI non-displaced  Gastrointestinal: Large abdomen, slightly distended, soft, NT/ND; no rebound or guarding; +BSx4  Back: spine midline, no bony tenderness or step-offs  Extremities: WWP, no clubbing or cyanosis; peripheral edema none-to-trace in b/l LE  Musculoskeletal: NROM x4; no joint swelling, tenderness or erythema  Dermatologic: skin warm, dry and intact; no rashes, wounds, or scars  Lymphatic: no submandibular or cervical LAD  Neurologic: AAOx3; CNII-XII grossly intact; b/l arm tremors that pt said has been from her abilify, worse on R  Psychiatric: affect and characteristics of appearance, verbalizations, behaviors are appropriate

## 2021-02-21 NOTE — ED ADULT TRIAGE NOTE - OTHER COMPLAINTS
SOB today, sent from [facility] rehab O2 in 80s. Room air 85, placed on nasal cannula. SOB today, sent from [facility] rehab O2 in 80s. Room air 85, placed on non rebreather

## 2021-02-21 NOTE — H&P ADULT - PROBLEM SELECTOR PLAN 7
-Continue home.. -Continue ipratropium and albuterol  -Pt previously not on home meds for the past 4-5 years as she said she didn't need it -Continue advair diskus, ipratorpium-albuterol   -Pt previously not on home meds for the past 4-5 years as she said she didn't need it

## 2021-02-21 NOTE — H&P ADULT - PROBLEM SELECTOR PLAN 2
#Acute Hypoxic Respiratory Failure   Respiratory failure likely 2/2 to viral process with high suspicion for COVID-19  Intitally on NRB saturating 97% was able to be weaned down to 2LNC saturating 92%. Currently in NAD, no accessory muscle use, no nasal flaring, able to speak in full sentences.   - wean supplemental oxygen as tolerated  - please ensure patient does incentive spirometry during her hospital stay #Acute Hypoxic Respiratory Failure   Respiratory failure likely 2/2 to viral process with high suspicion for COVID-19  Intitally on NRB saturating 97% was able to be weaned down to 2LNC saturating 92%. Currently in NAD, no accessory muscle use, no nasal flaring, able to speak in full sentences.   - wean supplemental oxygen as tolerated  - please ensure patient does incentive spirometry during her hospital stay    #Metabolic alkalosis  -Patient had been discharged in previous January adm on Na Bicarb in setting of [facility] from renal failure. Renal function found to be recovered on this adm, with no AG.  -Will not continue sodium bicarbonate #Acute Hypoxic Respiratory Failure   Respiratory failure likely 2/2 to viral process with high suspicion for COVID-19  Intitally on NRB saturating 97% was able to be weaned down to 2LNC saturating 92%. Currently in NAD, no accessory muscle use, no nasal flaring, able to speak in full sentences.   - wean supplemental oxygen as tolerated  - please ensure patient does incentive spirometry during her hospital stay  - As pt's CXR concerning for some fluid as well, and given that she was "gasping for air" in last few days while being off lasix (previous 40 oral) since the january hospital adm (dc'd due to kidney function impaired at the time). Will give lasix challenge 40 mg IV x1 tonight    #Metabolic alkalosis  -Patient had been discharged in previous January adm on Na Bicarb in setting of [facility] from renal failure. Renal function found to be recovered on this adm, with no AG. This metabolic alkalosis likely iatrogenic from the Na bicarb.   -Will not continue sodium bicarbonate anymore

## 2021-02-21 NOTE — ED ADULT NURSE NOTE - OBJECTIVE STATEMENT
Pt sent in by nursing home for hypoxia, pt told she has viral pneumonia. Pt denies chest pain, sob, dizziness, n/v. Pt denies any symptoms at this time.

## 2021-02-21 NOTE — H&P ADULT - PROBLEM SELECTOR PLAN 1
Found w/ b/l infiltrates on CXR, and 2/4 SIRS criteria, RR and WBC meeting criteria. Lactate < 2. Procal low, low suspicion for bacterial process. Sx and labs and imaging c/f COVID infection  -F/u covid swab  -F/u bcx, ucx, monitor fever curve    #Pneumonia 2/2 to COVID PNA  Labs (d-dimer, ferritin, crp ) elevated with CXR demonstrating bilateral infiltrates; coming from [facility]. Strong suspicion for COVID-19.   - f/u swab  - wean supplemental oxygen as tolerated  - Isolation precautions; contact and airborne  - trend COVID-19 labs; if required  - Monitor O2 saturation, monitor for respiratory distress  - albuterol MDI; avoid duonebs if needed  - c/w Decadron 6mg for 10 day course  - initiate Remdesevir  (will need approval) Found w/ b/l infiltrates on CXR, and 2/4 SIRS criteria, RR and WBC meeting criteria. Lactate < 2. Procal low, low suspicion for bacterial process. Sx and labs and imaging c/f COVID infection  -F/u covid swab  -F/u bcx, ucx, monitor fever curve    #Pneumonia 2/2 to COVID PNA  Labs (d-dimer, ferritin, crp ) elevated with CXR demonstrating bilateral infiltrates; coming from [facility]. Strong suspicion for COVID-19.   - swab was positive  - wean supplemental oxygen as tolerated  - Isolation precautions; contact and airborne  - trend COVID-19 labs; if required  - Monitor O2 saturation, monitor for respiratory distress  - albuterol MDI; avoid duonebs if needed  - c/w Decadron 6mg for 10 day course  - initiate Remdesevir  (will need approval) Found w/ b/l infiltrates on CXR, and 2/4 SIRS criteria, RR and WBC meeting criteria. Lactate < 2. Procal low, low suspicion for bacterial process. Sx and labs and imaging c/f COVID infection  - covid swab positive 2/21  -F/u bcx, ucx, monitor fever curve    #Pneumonia 2/2 to COVID PNA  Labs (d-dimer, ferritin, crp ) elevated with CXR demonstrating bilateral infiltrates; coming from [facility]. Strong suspicion for COVID-19.   - swab was positive  - wean supplemental oxygen as tolerated  - Isolation precautions; contact and airborne  - trend COVID-19 labs; if required  - Monitor O2 saturation, monitor for respiratory distress  - albuterol MDI; avoid duonebs if needed  - c/w Decadron 6mg for 10 day course (do 5 more days as pt has been on since 2/16)  - initiate Remdesevir taper, approved 2/21

## 2021-02-21 NOTE — ED PROVIDER NOTE - OBJECTIVE STATEMENT
69 y/o F with PMHx asthma (Dx after 9/11 attack) HTN, DM, HLD, L hip arthritis, bipolar disorder, recently admitted this January for UTI complicated by sepsis, with acute kidney injury, rhabdomyolysis, and transaminitis, dc'd to subacute rehab facility. Pt states she was initially doing well, but 1 week ago developed cough, SOB, and subjective fever. States she was recently diagnosed with viral pneumonia but does not know her covid-19 status. Currently on decadron 8mg (started on 2/16/21) and levaquin (finished today). States symptoms have been worsening every day since onset. Pt was noted to be hypoxic this morning and was sent to the ED for further evaluation. Pt's O2 sat was 93% on NRB, so O2 was increased from 10-15L. States since her symptoms started her asthma has flared as well, requiring nebulizer treatments which do help. Right now denies any chest tightness or need for nebulizers. Denies any nausea, vomiting, diarrhea, abdominal pain, back pain, or flank pain.  Pt states she does have tremors as a side affect from her abilify, and when she began to felt sick her tremors worsened.

## 2021-02-21 NOTE — ED ADULT NURSE NOTE - OTHER COMPLAINTS
SOB today, sent from [facility] rehab O2 in 80s. Room air 85, placed on non rebreather

## 2021-02-21 NOTE — H&P ADULT - ATTENDING COMMENTS
71 yo F with past medical history of  Asthma (Dx after 9/11 attack) HTN, DM, HLD, L hip arthritis, Bipolar Disorder recently admitted for UTI with E. Coli bacteremia that presents from UES due to hypoxia, admitted for management of COVID infection (+ 2/21). On Previous admission patient with significant FACUNDO which now resolved. At that time nephrotoxic medications were held including Lasix.     Currently patient afebrile, non-tachycardic, BP elevated. RR 18, 93% on 4LNC. Patient obese, lying comfortably in bed, Crackles bibasilar, abdomen soft, trace pitting edema.     Labs significant for Anemia at baseline, leukocytosis, elevated INR, elevated BMP.     70F with AHRF and COVID19, some degree of fluid overload.     #Sepsis - likely leukocytosis from steroids given at outside facility, loose call of sepsis  #AHRF - Patient would benefit from Lasix 40mg IVP stat, would restart lasix standing during this admission as Kidney function improved  #COVID-19 - 5 more days Decadron, would follow up regarding Remdesivir (late in course not sure benefit)   #Anemia - Retic and Iron Panel in morning (Anemia Chronic dz/kidney)  #Elevated INR - Patient was on Eliquis at facility, holding now, continue to monitor, no other signs of liver dysfunction to suggest nonmedication cause    Other problems as above

## 2021-02-21 NOTE — H&P ADULT - ASSESSMENT
69 yo F with past medical history of  Asthma (Dx after 9/11 attack) HTN, DM, HLD, L hip arthritis, Bipolar Disorder recently admitted for UTI with E. Coli bacteremia that presents from [facility]ES due to hypoxia. ICU consulted for acute hypoxic respiratory failure                 69 yo F with past medical history of  Asthma (Dx after 9/11 attack) HTN, DM, HLD, L hip arthritis, Bipolar Disorder recently admitted for UTI with E. Coli bacteremia that presents from UES due to hypoxia, admitted for management of COVID infection (+ 2/21)

## 2021-02-21 NOTE — H&P ADULT - PROBLEM SELECTOR PLAN 10
F: None  E: Replete PRN  N: DASH diet  GI: None  DVT: Lovenox 40 mg QD F: None  E: Replete PRN  N: DASH diet  GI: None  DVT: Lovenox 40 mg Q12 hrs

## 2021-02-21 NOTE — ED PROVIDER NOTE - PHYSICAL EXAMINATION
CONSTITUTIONAL: Well-appearing; well-nourished; in no apparent distress.   HEAD: Normocephalic; atraumatic.   EYES:  conjunctiva and sclera clear  ENT: normal nose; no rhinorrhea; normal pharynx with no erythema or lesions.   NECK: Supple; non-tender;   CARDIOVASCULAR: Normal S1, S2; no murmurs, rubs, or gallops. Regular rate and rhythm.   RESPIRATORY: Increased rate and effort of breathing on NRB; crackles heard b/l, more prominently on L side.  GI: Soft; non-distended; non-tender; no palpable organomegaly.   EXT: No cyanosis or edema; N/V intact  SKIN: Normal for age and race; warm; dry; good turgor; no apparent lesions or rash.   NEURO: A & O x 3; face symmetric; grossly unremarkable.   PSYCHOLOGICAL: The patient’s mood and manner are appropriate.

## 2021-02-21 NOTE — CONSULT NOTE ADULT - SUBJECTIVE AND OBJECTIVE BOX
[facility] SERVICE CONSULTATION NOTE    CC:    HPI:    71 yo F with past medical history of  Asthma (Dx after 9/11 attack) HTN, DM, HLD, L hip arthritis, Bipolar Disorder recently admitted for UTI with E. Coli bacteremia that presents from UES due to hypoxia.     ROS:  Otherwise negative, except as specified in HPI.    PMH:    PSH:    FH:    SH:    ALLERGIES:    MEDICATIONS:    VITAL SIGNS:  ICU Vital Signs Last 24 Hrs  T(C): 38.2 (21 Feb 2021 12:00), Max: 38.2 (21 Feb 2021 12:00)  T(F): 100.8 (21 Feb 2021 12:00), Max: 100.8 (21 Feb 2021 12:00)  HR: 85 (21 Feb 2021 12:00) (85 - 86)  BP: 113/80 (21 Feb 2021 11:43) (113/80 - 113/80)  BP(mean): --  ABP: --  ABP(mean): --  RR: 22 (21 Feb 2021 11:56) (22 - 22)  SpO2: 96% (21 Feb 2021 11:56) (85% - 96%)    CAPILLARY BLOOD GLUCOSE          PHYSICAL EXAM:  Constitutional: resting comfortably in bed, NAD  HEENT: NC/AT; PERRL, anicteric sclera; no oropharyngeal erythema or exudates; MMM  Neck: supple, no appreciable JVD  Respiratory: CTA B/L, no W/R/R; respirations appear non-labored, conversive in full sentences  Cardiovascular: +S1/S2, RRR  Gastrointestinal: abdomen soft, NT/ND  Extremities: WWP; no clubbing, cyanosis or edema  Vascular: 2+ radial, femoral, and DP/PT pulses B/L  Dermatologic: skin normal color and turgor; no visible rashes  Neurological:     LABS:                        9.2    12.46 )-----------( 266      ( 21 Feb 2021 12:08 )             28.3     02-21    135  |  95<L>  |  15  ----------------------------<  169<H>  3.5   |  30  |  0.66    Ca    8.7      21 Feb 2021 12:08    TPro  6.3  /  Alb  3.1<L>  /  TBili  0.4  /  DBili  x   /  AST  19  /  ALT  13  /  AlkPhos  67  02-21    PT/INR - ( 21 Feb 2021 12:08 )   PT: 20.4 sec;   INR: 1.75          PTT - ( 21 Feb 2021 12:08 )  PTT:32.4 sec  Lactate, Blood: 1.3 mmol/L (02-21-21 @ 12:08)              EKG: Reviewed.    RADIOLOGY & ADDITIONAL TESTS: Reviewed. [facility] SERVICE CONSULTATION NOTE    CC:    HPI:    71 yo F with past medical history of  Asthma (Dx after 9/11 attack) HTN, DM, HLD, L hip arthritis, Bipolar Disorder recently admitted for UTI with E. Coli bacteremia that presents from UES due to SOB and hypoxia. Pt states she has been having SOB and mild cough for few days and was told she had "viral PNA". She was treated decadron 8mg (started on 2/16/21) and levaquin (finished today). Pt reports minimal to no improvement in her symptoms and was noted to by hypoxic on vitals this AM. Pt was placed on NRB with improvement to 93%. Pt states that when she does not have supplemental oxygen she feels like is gasping for air.     T: HR: BP: RR: Saturating on . Labs remarkable for CXR demonstrating CT head demonstrating. Pt received IV . ICU consulted for acute hypoxic respiratory failure with high suspicion for COVID PNA.     ROS:  Otherwise negative, except as specified in HPI.    PMH:  PAST MEDICAL & SURGICAL HISTORY:  HLD (hyperlipidemia)    Bipolar disorder    Arthritis of left hip    Asthma    Diabetes mellitus    HTN (hypertension)    No significant past surgical history          ALLERGIES:  Allergies    No Known Allergies    Intolerances      MEDICATIONS:  Home Medications:  ARIPiprazole 20 mg oral tablet: 1 tab(s) orally once a day (25 Jan 2021 11:01)  insulin lispro 100 units/mL injectable solution: Sliding scale coverage three times a day with meals and before bedtime   Sugar 150-200: 2 units  201-250: 4 units   251-300: 6 units  301-350: 8 units (29 Jan 2021 16:06)  ipratropium-albuterol 0.5 mg-2.5 mg/3 mLinhalation solution: 3 milliliter(s) inhaled every 4 hours (29 Jan 2021 16:06)  pantoprazole 40 mg oral delayed release tablet: 1 tab(s) orally once a day (before a meal) (29 Jan 2021 16:06)  rosuvastatin 5 mg oral tablet: 1 tab(s) orally once a day (25 Jan 2021 11:01)  simethicone 80 mg oral tablet, chewable: 1 tab(s) orally once a day, As needed, Gas (29 Jan 2021 16:06)  sodium bicarbonate 650 mg oral tablet: 1 tab(s) orally every 12 hours (29 Jan 2021 16:06)      VITAL SIGNS:  ICU Vital Signs Last 24 Hrs  T(C): 38.2 (21 Feb 2021 12:00), Max: 38.2 (21 Feb 2021 12:00)  T(F): 100.8 (21 Feb 2021 12:00), Max: 100.8 (21 Feb 2021 12:00)  HR: 85 (21 Feb 2021 12:00) (85 - 86)  BP: 113/80 (21 Feb 2021 11:43) (113/80 - 113/80)  BP(mean): --  ABP: --  ABP(mean): --  RR: 22 (21 Feb 2021 11:56) (22 - 22)  SpO2: 96% (21 Feb 2021 11:56) (85% - 96%)    CAPILLARY BLOOD GLUCOSE          PHYSICAL EXAM:  Constitutional: resting comfortably in bed, NAD on NRB  HEENT: NC/AT; PERRL, anicteric sclera; no oropharyngeal erythema or exudates; MMM  Neck: supple, no appreciable JVD  Respiratory: crackles bilaterally; respirations appear non-labored, conversive in full sentences  Cardiovascular: +S1/S2, RRR  Gastrointestinal: obese abdomen, soft, NT/ND  Extremities: WWP; no clubbing, cyanosis or edema  Vascular: 2+ radial, femoral, and DP/PT pulses B/L  Dermatologic: skin normal color and turgor; no visible rashes  Neurological: AOX3, no focal deficits     LABS:                        9.2    12.46 )-----------( 266      ( 21 Feb 2021 12:08 )             28.3     02-21    135  |  95<L>  |  15  ----------------------------<  169<H>  3.5   |  30  |  0.66    Ca    8.7      21 Feb 2021 12:08    TPro  6.3  /  Alb  3.1<L>  /  TBili  0.4  /  DBili  x   /  AST  19  /  ALT  13  /  AlkPhos  67  02-21    PT/INR - ( 21 Feb 2021 12:08 )   PT: 20.4 sec;   INR: 1.75          PTT - ( 21 Feb 2021 12:08 )  PTT:32.4 sec  Lactate, Blood: 1.3 mmol/L (02-21-21 @ 12:08)              EKG: Reviewed.    RADIOLOGY & ADDITIONAL TESTS: Reviewed. [facility] SERVICE CONSULTATION NOTE    CC:    HPI:    69 yo F with past medical history of  Asthma (Dx after 9/11 attack) HTN, DM, HLD, L hip arthritis, Bipolar Disorder recently admitted for UTI with E. Coli bacteremia that presents from UES due to SOB and hypoxia. Pt states she has been having SOB and mild cough for few days and was told she had "viral PNA". She was treated decadron 8mg (started on 2/16/21) and levaquin (finished today). Pt reports minimal to no improvement in her symptoms and was noted to by hypoxic on vitals this AM. Pt was placed on NRB with improvement to 93%. Pt states that when she does not have supplemental oxygen she feels like is gasping for air.     T: Vitals: T 98.9, HR 86, /80, RR 22, Spo2 85% on RA -> 97% on NRB -> 92% on 4LNC  Labs: Notable for WBC 12.46, Hb 9.2, lymphocyte % 4.7, INR 1.75, , Lactate 1.3 (<2), procal 0.06, BNP 2034, VBG 7.49, Pc02 40, Po2 44, Hc03 30  Imaging: CXR mild cardiomegaly. Bilateral infiltrates.   ICU consulted for acute hypoxic respiratory failure .    ROS:  Otherwise negative, except as specified in HPI.    PMH:  PAST MEDICAL & SURGICAL HISTORY:  HLD (hyperlipidemia)    Bipolar disorder    Arthritis of left hip    Asthma    Diabetes mellitus    HTN (hypertension)    No significant past surgical history          ALLERGIES:  Allergies    No Known Allergies    Intolerances      MEDICATIONS:  Home Medications:  ARIPiprazole 20 mg oral tablet: 1 tab(s) orally once a day (25 Jan 2021 11:01)  insulin lispro 100 units/mL injectable solution: Sliding scale coverage three times a day with meals and before bedtime   Sugar 150-200: 2 units  201-250: 4 units   251-300: 6 units  301-350: 8 units (29 Jan 2021 16:06)  ipratropium-albuterol 0.5 mg-2.5 mg/3 mLinhalation solution: 3 milliliter(s) inhaled every 4 hours (29 Jan 2021 16:06)  pantoprazole 40 mg oral delayed release tablet: 1 tab(s) orally once a day (before a meal) (29 Jan 2021 16:06)  rosuvastatin 5 mg oral tablet: 1 tab(s) orally once a day (25 Jan 2021 11:01)  simethicone 80 mg oral tablet, chewable: 1 tab(s) orally once a day, As needed, Gas (29 Jan 2021 16:06)  sodium bicarbonate 650 mg oral tablet: 1 tab(s) orally every 12 hours (29 Jan 2021 16:06)      VITAL SIGNS:  ICU Vital Signs Last 24 Hrs  T(C): 38.2 (21 Feb 2021 12:00), Max: 38.2 (21 Feb 2021 12:00)  T(F): 100.8 (21 Feb 2021 12:00), Max: 100.8 (21 Feb 2021 12:00)  HR: 85 (21 Feb 2021 12:00) (85 - 86)  BP: 113/80 (21 Feb 2021 11:43) (113/80 - 113/80)  BP(mean): --  ABP: --  ABP(mean): --  RR: 22 (21 Feb 2021 11:56) (22 - 22)  SpO2: 96% (21 Feb 2021 11:56) (85% - 96%)    CAPILLARY BLOOD GLUCOSE          PHYSICAL EXAM:  Constitutional: resting comfortably in bed, NAD on NRB  HEENT: NC/AT; PERRL, anicteric sclera; no oropharyngeal erythema or exudates; MMM  Neck: supple, no appreciable JVD  Respiratory: crackles bilaterally; respirations appear non-labored, conversive in full sentences  Cardiovascular: +S1/S2, RRR  Gastrointestinal: obese abdomen, soft, NT/ND  Extremities: WWP; no clubbing, cyanosis or edema  Vascular: 2+ radial, femoral, and DP/PT pulses B/L  Dermatologic: skin normal color and turgor; no visible rashes  Neurological: AOX3, no focal deficits     LABS:                        9.2    12.46 )-----------( 266      ( 21 Feb 2021 12:08 )             28.3     02-21    135  |  95<L>  |  15  ----------------------------<  169<H>  3.5   |  30  |  0.66    Ca    8.7      21 Feb 2021 12:08    TPro  6.3  /  Alb  3.1<L>  /  TBili  0.4  /  DBili  x   /  AST  19  /  ALT  13  /  AlkPhos  67  02-21    PT/INR - ( 21 Feb 2021 12:08 )   PT: 20.4 sec;   INR: 1.75          PTT - ( 21 Feb 2021 12:08 )  PTT:32.4 sec  Lactate, Blood: 1.3 mmol/L (02-21-21 @ 12:08)              EKG: Reviewed.    RADIOLOGY & ADDITIONAL TESTS: Reviewed.

## 2021-02-21 NOTE — H&P ADULT - PROBLEM SELECTOR PLAN 6
-Continue home... -Continue home medication abilify -Continue home medication abilify    #B/l arm tremors  -Per patient, 2/2 to her abilify as a medication side effect, monitor

## 2021-02-21 NOTE — ED PROVIDER NOTE - CLINICAL SUMMARY MEDICAL DECISION MAKING FREE TEXT BOX
69 y/o F here w/ likely covid-19 infection. Will place on isolation. Will plan to send covid-19 labs, and admission for decadron, oxygen, monitoring, and consideration for advanced therapeutics.

## 2021-02-21 NOTE — H&P ADULT - NSHPLABSRESULTS_GEN_ALL_CORE
LABS:                         9.2    12.46 >-----< 266           ( 02-21-21 @ 12:08 )             28.3       135  |  95  |   15  ----------------------< 169    (02-21-21 @ 12:08)     3.5  |  30  |  0.66    Anion Gap: 10    Ca   8.7   (02-21-21 @ 12:08)  Mg   x    (02-21-21 @ 12:08)  Phos x    (02-21-21 @ 12:08)       TP 8.7     |  AST 3.1    -------------------------     Alb x     |  ALT x               (02-21-21 @ 12:08)  -------------------------     T-bili 3.1  |  AlkPh 67    D-bili x   COAGULATION STUDIES:     aPTT  32.4 sec    (02-21-21 @ 12:08)     PT     20.4 sec    (02-21-21 @ 12:08)     INR    1.75          (02-21-21 @ 12:08)     I/O SUMMARY:    < from: Xray Chest 1 View-PORTABLE IMMEDIATE (02.21.21 @ 12:48) >    Frontal examination of the chest demonstrates mild cardiomegaly. Bilateral infiltrates. Degenerative changesthoracic spine.    Impression: Bilateral infiltrates    < end of copied text >

## 2021-02-21 NOTE — H&P ADULT - HISTORY OF PRESENT ILLNESS
IN PROGRESS (INCLUDING A&P)    69 yo F with past medical history of  Asthma (Dx after 9/11 attack) HTN, DM, HLD, L hip arthritis, Bipolar Disorder recently admitted for UTI with E. Coli bacteremia (complicated by sepsis, with acute kidney injury, rhabdomyolysis, and transaminitis, dc'd to subacute rehab facility, now presenting from [facility] rehab due to SOB and hypoxia. Pt states she has been having SOB and mild cough starting a week ago and was told she had "viral PNA". She was treated decadron 8mg (started on 2/16/21) and levaquin (finished today). Pt reports minimal to no improvement in her symptoms and was noted to by hypoxic on vitals this AM. Pt was placed on NRB with improvement to 93%. Pt states that when she does not have supplemental oxygen she feels like is gasping for air. States since her symptoms started her asthma has flared as well, requiring nebulizer treatments which do help. Right now denies any chest tightness or need for nebulizers. Denies any nausea, vomiting, diarrhea, abdominal pain, back pain, or flank pain. Pt states she does have tremors as a side affect from her abilify, and when she began to felt sick her tremors worsened.    ED Course  Vitals: T 98.9, HR 86, /80, RR 22, Spo2 85% on RA  Labs: Notable for WBC 12.46, Hb 9.2, lymphocyte % 4.7, INR 1.75, , Lactate 1.3 (<2), procal 0.06, BNP 2034, VBG 7.49, Pc02 40, Po2 44, Hc03 30  Imaging: CXR mild cardiomegaly. Bilateral infiltrates. Degenerative changes thoracic spine  Received tyl 650 x1.   ICU initially consulted for hypoxia as pt was put on NRB. Was assessed, found to be in no respiratory distress, saturating 92-94%, with decision that COVID icu/tele monitoring not needed at this time.     ROS:  Otherwise negative, except as specified in HPI.  PMH:  PAST MEDICAL & SURGICAL HISTORY:  HLD (hyperlipidemia)    Bipolar disorder  Arthritis of left hip  Asthma  Diabetes mellitus  HTN (hypertension)  No significant past surgical history    ALLERGIES:  Allergies  No Known Allergies  Intolerances    MEDICATIONS:  Home Medications:  ARIPiprazole 20 mg oral tablet: 1 tab(s) orally once a day (25 Jan 2021 11:01)  insulin lispro 100 units/mL injectable solution: Sliding scale coverage three times a day with meals and before bedtime   Sugar 150-200: 2 units  201-250: 4 units   251-300: 6 units  301-350: 8 units (29 Jan 2021 16:06)  ipratropium-albuterol 0.5 mg-2.5 mg/3 mLinhalation solution: 3 milliliter(s) inhaled every 4 hours (29 Jan 2021 16:06)  pantoprazole 40 mg oral delayed release tablet: 1 tab(s) orally once a day (before a meal) (29 Jan 2021 16:06)  rosuvastatin 5 mg oral tablet: 1 tab(s) orally once a day (25 Jan 2021 11:01)  simethicone 80 mg oral tablet, chewable: 1 tab(s) orally once a day, As needed, Gas (29 Jan 2021 16:06)  sodium bicarbonate 650 mg oral tablet: 1 tab(s) orally every 12 hours (29 Jan 2021 16:06) 69 yo F with past medical history of  Asthma (Dx after 9/11 attack) HTN, DM, HLD, L hip arthritis, Bipolar Disorder recently admitted for UTI with E. Coli bacteremia (complicated by sepsis, with acute kidney injury, rhabdomyolysis, and transaminitis, dc'd to subacute rehab facility, now presenting from [facility] rehab due to SOB and hypoxia. Pt states she has been having SOB and mild cough starting a week ago (with some nighttime awakenings, gasping for air) and was told she had "viral PNA". She was treated decadron 8mg (started on 2/16/21) and levaquin (finished today). Pt reports minimal to no improvement in her symptoms and was noted to by hypoxic on vitals this AM. Pt was placed on NRB with improvement to 93%. Pt states that when she does not have supplemental oxygen she feels like is gasping for air. States since her symptoms started her asthma has flared as well, requiring nebulizer treatments which do help. Right now denies any chest tightness or need for nebulizers. Denies any nausea, vomiting, diarrhea, abdominal pain, back pain, or flank pain. Pt states she does have tremors as a side affect from her abilify, and when she began to felt sick her tremors worsened.    ED Course  Vitals: T 98.9, HR 86, /80, RR 22, Spo2 85% on RA  Labs: Notable for WBC 12.46, Hb 9.2, lymphocyte % 4.7, INR 1.75, , Lactate 1.3 (<2), procal 0.06, BNP 2034, VBG 7.49, Pc02 40, Po2 44, Hc03 30  Imaging: CXR mild cardiomegaly. Bilateral infiltrates. Degenerative changes thoracic spine  Received tyl 650 x1.   ICU initially consulted for hypoxia as pt was put on NRB. Was assessed, found to be in no respiratory distress, saturating 92-94%, with decision that COVID icu/tele monitoring not needed at this time.     ROS:  Otherwise negative, except as specified in HPI.  PMH:  PAST MEDICAL & SURGICAL HISTORY:  HLD (hyperlipidemia)    Bipolar disorder  Arthritis of left hip  Asthma  Diabetes mellitus  HTN (hypertension)  No significant past surgical history    ALLERGIES:  Allergies  No Known Allergies  Intolerances    MEDICATIONS:  Home Medications:  ARIPiprazole 20 mg oral tablet: 1 tab(s) orally once a day (25 Jan 2021 11:01)  insulin lispro 100 units/mL injectable solution: Sliding scale coverage three times a day with meals and before bedtime   Sugar 150-200: 2 units  201-250: 4 units   251-300: 6 units  301-350: 8 units (29 Jan 2021 16:06)  ipratropium-albuterol 0.5 mg-2.5 mg/3 mLinhalation solution: 3 milliliter(s) inhaled every 4 hours (29 Jan 2021 16:06)  pantoprazole 40 mg oral delayed release tablet: 1 tab(s) orally once a day (before a meal) (29 Jan 2021 16:06)  rosuvastatin 5 mg oral tablet: 1 tab(s) orally once a day (25 Jan 2021 11:01)  simethicone 80 mg oral tablet, chewable: 1 tab(s) orally once a day, As needed, Gas (29 Jan 2021 16:06)  sodium bicarbonate 650 mg oral tablet: 1 tab(s) orally every 12 hours (29 Jan 2021 16:06)

## 2021-02-22 LAB
ALBUMIN SERPL ELPH-MCNC: 2.8 G/DL — LOW (ref 3.3–5)
ALP SERPL-CCNC: 72 U/L — SIGNIFICANT CHANGE UP (ref 40–120)
ALT FLD-CCNC: 13 U/L — SIGNIFICANT CHANGE UP (ref 10–45)
ANION GAP SERPL CALC-SCNC: 11 MMOL/L — SIGNIFICANT CHANGE UP (ref 5–17)
ANISOCYTOSIS BLD QL: SLIGHT — SIGNIFICANT CHANGE UP
APPEARANCE UR: CLEAR — SIGNIFICANT CHANGE UP
AST SERPL-CCNC: 16 U/L — SIGNIFICANT CHANGE UP (ref 10–40)
BACTERIA # UR AUTO: PRESENT /HPF
BASE EXCESS BLDA CALC-SCNC: 6 MMOL/L — HIGH (ref -2–3)
BASOPHILS # BLD AUTO: 0 K/UL — SIGNIFICANT CHANGE UP (ref 0–0.2)
BASOPHILS NFR BLD AUTO: 0 % — SIGNIFICANT CHANGE UP (ref 0–2)
BILIRUB SERPL-MCNC: 0.3 MG/DL — SIGNIFICANT CHANGE UP (ref 0.2–1.2)
BILIRUB UR-MCNC: NEGATIVE — SIGNIFICANT CHANGE UP
BUN SERPL-MCNC: 18 MG/DL — SIGNIFICANT CHANGE UP (ref 7–23)
CALCIUM SERPL-MCNC: 9.2 MG/DL — SIGNIFICANT CHANGE UP (ref 8.4–10.5)
CHLORIDE SERPL-SCNC: 100 MMOL/L — SIGNIFICANT CHANGE UP (ref 96–108)
CO2 SERPL-SCNC: 29 MMOL/L — SIGNIFICANT CHANGE UP (ref 22–31)
COLOR SPEC: YELLOW — SIGNIFICANT CHANGE UP
COMMENT - URINE: SIGNIFICANT CHANGE UP
CREAT SERPL-MCNC: 0.62 MG/DL — SIGNIFICANT CHANGE UP (ref 0.5–1.3)
CRP SERPL-MCNC: 4.96 MG/DL — HIGH (ref 0–0.4)
D DIMER BLD IA.RAPID-MCNC: 307 NG/ML DDU — HIGH
DACRYOCYTES BLD QL SMEAR: SLIGHT — SIGNIFICANT CHANGE UP
DIFF PNL FLD: NEGATIVE — SIGNIFICANT CHANGE UP
EOSINOPHIL # BLD AUTO: 0 K/UL — SIGNIFICANT CHANGE UP (ref 0–0.5)
EOSINOPHIL NFR BLD AUTO: 0 % — SIGNIFICANT CHANGE UP (ref 0–6)
EPI CELLS # UR: ABNORMAL /HPF (ref 0–5)
FERRITIN SERPL-MCNC: 398 NG/ML — HIGH (ref 15–150)
GAS PNL BLDA: SIGNIFICANT CHANGE UP
GLUCOSE BLDC GLUCOMTR-MCNC: 235 MG/DL — HIGH (ref 70–99)
GLUCOSE BLDC GLUCOMTR-MCNC: 246 MG/DL — HIGH (ref 70–99)
GLUCOSE BLDC GLUCOMTR-MCNC: 297 MG/DL — HIGH (ref 70–99)
GLUCOSE SERPL-MCNC: 259 MG/DL — HIGH (ref 70–99)
GLUCOSE UR QL: NEGATIVE — SIGNIFICANT CHANGE UP
HCO3 BLDA-SCNC: 28 MMOL/L — SIGNIFICANT CHANGE UP (ref 21–28)
HCT VFR BLD CALC: 28.3 % — LOW (ref 34.5–45)
HGB BLD-MCNC: 9.2 G/DL — LOW (ref 11.5–15.5)
HYPOCHROMIA BLD QL: SLIGHT — SIGNIFICANT CHANGE UP
IRON SATN MFR SERPL: 13 % — LOW (ref 14–50)
IRON SATN MFR SERPL: 24 UG/DL — LOW (ref 30–160)
KETONES UR-MCNC: NEGATIVE — SIGNIFICANT CHANGE UP
LEUKOCYTE ESTERASE UR-ACNC: ABNORMAL
LYMPHOCYTES # BLD AUTO: 1.81 K/UL — SIGNIFICANT CHANGE UP (ref 1–3.3)
LYMPHOCYTES # BLD AUTO: 15.7 % — SIGNIFICANT CHANGE UP (ref 13–44)
MAGNESIUM SERPL-MCNC: 1.3 MG/DL — LOW (ref 1.6–2.6)
MANUAL SMEAR VERIFICATION: SIGNIFICANT CHANGE UP
MCHC RBC-ENTMCNC: 28.2 PG — SIGNIFICANT CHANGE UP (ref 27–34)
MCHC RBC-ENTMCNC: 32.5 GM/DL — SIGNIFICANT CHANGE UP (ref 32–36)
MCV RBC AUTO: 86.8 FL — SIGNIFICANT CHANGE UP (ref 80–100)
MONOCYTES # BLD AUTO: 0.5 K/UL — SIGNIFICANT CHANGE UP (ref 0–0.9)
MONOCYTES NFR BLD AUTO: 4.3 % — SIGNIFICANT CHANGE UP (ref 2–14)
NEUTROPHILS # BLD AUTO: 9.25 K/UL — HIGH (ref 1.8–7.4)
NEUTROPHILS NFR BLD AUTO: 80 % — HIGH (ref 43–77)
NITRITE UR-MCNC: NEGATIVE — SIGNIFICANT CHANGE UP
OVALOCYTES BLD QL SMEAR: SLIGHT — SIGNIFICANT CHANGE UP
PCO2 BLDA: 34 MMHG — SIGNIFICANT CHANGE UP (ref 32–45)
PH BLDA: 7.55 — HIGH (ref 7.35–7.45)
PH UR: 7 — SIGNIFICANT CHANGE UP (ref 5–8)
PHOSPHATE SERPL-MCNC: 2.5 MG/DL — SIGNIFICANT CHANGE UP (ref 2.5–4.5)
PLAT MORPH BLD: NORMAL — SIGNIFICANT CHANGE UP
PLATELET # BLD AUTO: 277 K/UL — SIGNIFICANT CHANGE UP (ref 150–400)
PO2 BLDA: 52 MMHG — CRITICAL LOW (ref 83–108)
POLYCHROMASIA BLD QL SMEAR: SLIGHT — SIGNIFICANT CHANGE UP
POTASSIUM SERPL-MCNC: 4 MMOL/L — SIGNIFICANT CHANGE UP (ref 3.5–5.3)
POTASSIUM SERPL-SCNC: 4 MMOL/L — SIGNIFICANT CHANGE UP (ref 3.5–5.3)
PROT SERPL-MCNC: 6.2 G/DL — SIGNIFICANT CHANGE UP (ref 6–8.3)
PROT UR-MCNC: 100 MG/DL
RBC # BLD: 3.26 M/UL — LOW (ref 3.8–5.2)
RBC # BLD: 3.26 M/UL — LOW (ref 3.8–5.2)
RBC # FLD: 13.2 % — SIGNIFICANT CHANGE UP (ref 10.3–14.5)
RBC BLD AUTO: ABNORMAL
RETICS #: 47.6 K/UL — SIGNIFICANT CHANGE UP (ref 25–125)
RETICS/RBC NFR: 1.5 % — SIGNIFICANT CHANGE UP (ref 0.5–2.5)
ROULEAUX BLD QL SMEAR: PRESENT
SAO2 % BLDA: 89 % — LOW (ref 95–100)
SODIUM SERPL-SCNC: 140 MMOL/L — SIGNIFICANT CHANGE UP (ref 135–145)
SP GR SPEC: 1.01 — SIGNIFICANT CHANGE UP (ref 1–1.03)
TIBC SERPL-MCNC: 190 UG/DL — LOW (ref 220–430)
TRANSFERRIN SERPL-MCNC: 155 MG/DL — LOW (ref 200–360)
UIBC SERPL-MCNC: 166 UG/DL — SIGNIFICANT CHANGE UP (ref 110–370)
UROBILINOGEN FLD QL: 0.2 E.U./DL — SIGNIFICANT CHANGE UP
WBC # BLD: 11.56 K/UL — HIGH (ref 3.8–10.5)
WBC # FLD AUTO: 11.56 K/UL — HIGH (ref 3.8–10.5)
WBC UR QL: ABNORMAL /HPF

## 2021-02-22 PROCEDURE — 99233 SBSQ HOSP IP/OBS HIGH 50: CPT | Mod: GC

## 2021-02-22 PROCEDURE — 93010 ELECTROCARDIOGRAM REPORT: CPT

## 2021-02-22 PROCEDURE — 71045 X-RAY EXAM CHEST 1 VIEW: CPT | Mod: 26

## 2021-02-22 RX ORDER — SODIUM CHLORIDE 9 MG/ML
1000 INJECTION, SOLUTION INTRAVENOUS
Refills: 0 | Status: DISCONTINUED | OUTPATIENT
Start: 2021-02-22 | End: 2021-03-04

## 2021-02-22 RX ORDER — DEXTROSE 50 % IN WATER 50 %
25 SYRINGE (ML) INTRAVENOUS ONCE
Refills: 0 | Status: DISCONTINUED | OUTPATIENT
Start: 2021-02-22 | End: 2021-03-04

## 2021-02-22 RX ORDER — IPRATROPIUM/ALBUTEROL SULFATE 18-103MCG
3 AEROSOL WITH ADAPTER (GRAM) INHALATION ONCE
Refills: 0 | Status: COMPLETED | OUTPATIENT
Start: 2021-02-22 | End: 2021-02-22

## 2021-02-22 RX ORDER — MAGNESIUM SULFATE 500 MG/ML
2 VIAL (ML) INJECTION
Refills: 0 | Status: COMPLETED | OUTPATIENT
Start: 2021-02-22 | End: 2021-02-22

## 2021-02-22 RX ORDER — DEXTROSE 50 % IN WATER 50 %
12.5 SYRINGE (ML) INTRAVENOUS ONCE
Refills: 0 | Status: DISCONTINUED | OUTPATIENT
Start: 2021-02-22 | End: 2021-03-04

## 2021-02-22 RX ORDER — ALBUTEROL 90 UG/1
2 AEROSOL, METERED ORAL EVERY 6 HOURS
Refills: 0 | Status: DISCONTINUED | OUTPATIENT
Start: 2021-02-22 | End: 2021-03-04

## 2021-02-22 RX ORDER — GLUCAGON INJECTION, SOLUTION 0.5 MG/.1ML
1 INJECTION, SOLUTION SUBCUTANEOUS ONCE
Refills: 0 | Status: DISCONTINUED | OUTPATIENT
Start: 2021-02-22 | End: 2021-03-04

## 2021-02-22 RX ORDER — IPRATROPIUM/ALBUTEROL SULFATE 18-103MCG
3 AEROSOL WITH ADAPTER (GRAM) INHALATION EVERY 6 HOURS
Refills: 0 | Status: DISCONTINUED | OUTPATIENT
Start: 2021-02-22 | End: 2021-02-28

## 2021-02-22 RX ORDER — INSULIN LISPRO 100/ML
VIAL (ML) SUBCUTANEOUS
Refills: 0 | Status: DISCONTINUED | OUTPATIENT
Start: 2021-02-22 | End: 2021-03-04

## 2021-02-22 RX ORDER — DEXTROSE 50 % IN WATER 50 %
15 SYRINGE (ML) INTRAVENOUS ONCE
Refills: 0 | Status: DISCONTINUED | OUTPATIENT
Start: 2021-02-22 | End: 2021-03-04

## 2021-02-22 RX ADMIN — Medication 3 MILLILITER(S): at 09:59

## 2021-02-22 RX ADMIN — Medication 50 GRAM(S): at 12:01

## 2021-02-22 RX ADMIN — ALBUTEROL 2 PUFF(S): 90 AEROSOL, METERED ORAL at 12:03

## 2021-02-22 RX ADMIN — ARIPIPRAZOLE 20 MILLIGRAM(S): 15 TABLET ORAL at 12:01

## 2021-02-22 RX ADMIN — ENOXAPARIN SODIUM 40 MILLIGRAM(S): 100 INJECTION SUBCUTANEOUS at 19:01

## 2021-02-22 RX ADMIN — PANTOPRAZOLE SODIUM 40 MILLIGRAM(S): 20 TABLET, DELAYED RELEASE ORAL at 06:54

## 2021-02-22 RX ADMIN — Medication 50 GRAM(S): at 09:43

## 2021-02-22 RX ADMIN — TIOTROPIUM BROMIDE 1 CAPSULE(S): 18 CAPSULE ORAL; RESPIRATORY (INHALATION) at 12:02

## 2021-02-22 RX ADMIN — Medication 6 MILLIGRAM(S): at 06:54

## 2021-02-22 RX ADMIN — Medication 4: at 09:23

## 2021-02-22 RX ADMIN — ENOXAPARIN SODIUM 40 MILLIGRAM(S): 100 INJECTION SUBCUTANEOUS at 07:00

## 2021-02-22 RX ADMIN — REMDESIVIR 500 MILLIGRAM(S): 5 INJECTION INTRAVENOUS at 19:04

## 2021-02-22 RX ADMIN — Medication 6: at 12:48

## 2021-02-22 RX ADMIN — ALBUTEROL 2 PUFF(S): 90 AEROSOL, METERED ORAL at 06:52

## 2021-02-22 NOTE — PROGRESS NOTE ADULT - PROBLEM SELECTOR PLAN 7
-Continue advair diskus, ipratorpium-albuterol   -Pt previously not on home meds for the past 4-5 years as she said she didn't need it Pt previously not on home meds for the past 4-5 years as she said she didn't need it  - c/w albuterol 2 puffs q6 hrs  - c/w spiriva qd

## 2021-02-22 NOTE — PROGRESS NOTE ADULT - PROBLEM SELECTOR PLAN 5
- On lisinopril 40mg at home (though more for leg swelling)  - [facility] i[facility]o low BP and FACUNDO  - May continue on this adm, renal function recovered    #Lower extremity edema.    Reports starting Lasix for LE edema. No Dx of CHF.  - Was held on previous admission and discharge as mentioned above    #HLD  -Continue simvastatin 10 mg QD home med Home med: lisinopril 40mg at home (though more for leg swelling), [facility] i/s[facility]o low BP and FACUNDO  - hold for now    #Lower extremity edema.    Reports starting Lasix for LE edema. No Dx of CHF.  - Was held on previous admission and discharge as mentioned above    #HLD  -Continue simvastatin 10 mg QD home med

## 2021-02-22 NOTE — PROGRESS NOTE ADULT - PROBLEM SELECTOR PLAN 7
-Continue advair diskus, ipratorpium-albuterol   -Pt previously not on home meds for the past 4-5 years as she said she didn't need it

## 2021-02-22 NOTE — PROGRESS NOTE ADULT - SUBJECTIVE AND OBJECTIVE BOX
***TRANSFER FROM [facility] TO [facility]****  INTERVAL HPI/OVERNIGHT EVENTS:  69 yo F with past medical history of Asthma (Dx after  attack) HTN, DM, HLD, L hip arthritis, Bipolar Disorder recently admitted for UTI with E. Coli bacteremia (complicated by sepsis, with acute kidney injury, rhabdomyolysis, and transaminitis), dc'd to subacute rehab facility, now presenting from [facility] rehab due to SOB, cough, and hypoxia x1 wk c/b subjective worsening of asthma requiring duonebs treatment (s/p decadron 8mg x5 days and levaquin course.) Hypoxia and dyspnea acutely worsened day of admit, 85% on presentation to ED. Pt was placed on NRB in ED with improvement to 93%. Denies any chest tightness or need for nebulizers. Denies any nausea, vomiting, diarrhea, abdominal pain, back pain, or flank pain. ICU consulted in ED, pt in no respiratory distress and able to saturate 92-94% on NC, so approved for admission to [facility] with tele/icu not required at that time. CXR showed b/l infiltrates, labs notable for WBC 12.46, D-dimer 488, BNP 2034, lactate 1.3 and procal 0.06    OSIRIS o/n and this AM pt able to speak in full sentences with no subjective SOB. Physical exam this AM notable for diffuse expiratory wheezes and bibasilar inspiratory crackles posteriorly and laterally. Pt also has baseline b/l UE and LE tremor, more pronounced on the R, which she attributes to her abilify usage. Pt had epsiode of untriggered desat to 85% on 6L NC, no subjective SOB, no tachycardia, no CP and pt in NAD. Pt place on her r side and with incentive spirometry O2 increased to 91%, however immediately decreased back to 86-88% once incentive spirometry halted. LE dopplers ordered and ICU consulted. Pt placed on NRB 15 L with O2 sat improved to 94% and placed on monitor, noted to have heart rate vacillating between 65 and 293, likely 2/2 baseline tremors but palpable radial pulse difficult to assess, EKG ordered which showed NSR at 71 bpm. ICU consult recommends step up to 2Wo/tele for closer monitoring.    VITAL SIGNS:  T(F): 97.8 (21 @ 08:43)  HR: 69 (21 @ 08:43)  BP: 155/75 (21 @ 08:43)  RR: 20 (21 @ 08:43)  SpO2: 88% (21 @ 08:43)  Wt(kg): --    Input & Output:    21 @ 07:01  -  21 @ 07:00  --------------------------------------------------------  IN: 300 mL / OUT: 800 mL / NET: -500 mL      PHYSICAL EXAM:  Constitutional: Obese, WDWN resting comfortably in bed on 6LNC; NAD, required increase to NRB per O2 sat  HEENT: NC/AT, PERRLA, EOMI, anicteric sclera, no conjunctival pallor, no nasal discharge; uvula midline, dry MM, neck supple w/ no LAD  Respiratory: Diffuse expiratory wheezes throughout all lung fields, Bibasilar inspiratory crackles posteriorly and laterally, no increased work of breathing or accessory muscle usage  Cardiac: +S1/S2; RRR; no M/R/G  Gastrointestinal: Large abdomen, slightly distended, soft, NT; no rebound or guarding; +BSx4  Extremities: WWP, no clubbing or cyanosis; nonpitting peripheral edema b/l LE  Musculoskeletal: Observed to move all 4 extremities independently, declined MSK exam; no joint swelling, tenderness or erythema  Dermatologic: skin warm, dry and intact; no rashes, wounds, or scars  Neurologic: AAOx3; CNII-XII grossly intact; b/l UE and LE tremors R>L that pt attributes to abilify,  Psychiatric: affect and characteristics of appearance, verbalizations, behaviors are appropriate    MEDICATIONS  (STANDING):  ALBUTerol    90 MICROgram(s) HFA Inhaler 2 Puff(s) Inhalation every 6 hours  ARIPiprazole 20 milliGRAM(s) Oral daily  dexAMETHasone     Tablet 6 milliGRAM(s) Oral daily  dextrose 40% Gel 15 Gram(s) Oral once  dextrose 5%. 1000 milliLiter(s) (50 mL/Hr) IV Continuous <Continuous>  dextrose 5%. 1000 milliLiter(s) (100 mL/Hr) IV Continuous <Continuous>  dextrose 50% Injectable 25 Gram(s) IV Push once  dextrose 50% Injectable 12.5 Gram(s) IV Push once  dextrose 50% Injectable 25 Gram(s) IV Push once  enoxaparin Injectable 40 milliGRAM(s) SubCutaneous every 12 hours  glucagon  Injectable 1 milliGRAM(s) IntraMuscular once  insulin lispro (ADMELOG) corrective regimen sliding scale   SubCutaneous Before meals and at bedtime  magnesium sulfate  IVPB 2 Gram(s) IV Intermittent every 2 hours  pantoprazole    Tablet 40 milliGRAM(s) Oral before breakfast  remdesivir  IVPB   IV Intermittent   remdesivir  IVPB 100 milliGRAM(s) IV Intermittent every 24 hours  simvastatin 10 milliGRAM(s) Oral at bedtime  tiotropium 18 MICROgram(s) Capsule 1 Capsule(s) Inhalation daily    MEDICATIONS  (PRN):  simethicone 80 milliGRAM(s) Chew daily PRN Gas      Allergies    No Known Allergies    Intolerances        LABS:                        9.2    11.56 )-----------( 277      ( 2021 06:42 )             28.3     02-    140  |  100  |  18  ----------------------------<  259<H>  4.0   |  29  |  0.62    Ca    9.2      2021 06:42  Phos  2.5       Mg     1.3         TPro  6.2  /  Alb  2.8<L>  /  TBili  0.3  /  DBili  x   /  AST  16  /  ALT  13  /  AlkPhos  72  -22    PT/INR - ( 2021 12:08 )   PT: 20.4 sec;   INR: 1.75          PTT - ( 2021 12:08 )  PTT:32.4 sec  Urinalysis Basic - ( 2021 07:05 )    Color: Yellow / Appearance: Clear / S.015 / pH: x  Gluc: x / Ketone: NEGATIVE  / Bili: Negative / Urobili: 0.2 E.U./dL   Blood: x / Protein: 100 mg/dL / Nitrite: NEGATIVE   Leuk Esterase: Trace / RBC: x / WBC 5-10 /HPF   Sq Epi: x / Non Sq Epi: Moderate /HPF / Bacteria: Present /HPF        RADIOLOGY & ADDITIONAL TESTS:

## 2021-02-22 NOTE — CONSULT NOTE ADULT - ATTENDING COMMENTS
Agree with exam.  Patient's respiratory status worsened now requiring NRM to maintain acceptable saturations. Will transfer to [facility] for further care.  Continue steroids and remdesivir.
Agree with exam.  Patient is in no respiratory distress, tolerating supplemental oxygen (2lpm) via NC with good saturation (92-94%).   No need for covid ICU or tele monitoring. If status changes we'll reevaluate.

## 2021-02-22 NOTE — CONSULT NOTE ADULT - ASSESSMENT
Assessment:   70F with PMH of asthma, HTN, DM, HLD, L hip arthritis, bipolar disorder (w/ chronic RUE tremor 2/2 Abilify), and recent admission for UTI with E. Coli bacteremia (01/24/21-01/29/21, now resolved), presenting from [facility] rehab with COVID-19 infection (on decadron at [facility] rehab, Remdesivir started here). ICU re-consulted for acute hypoxic respiratory failure (now saturating 87-88% on 6L NC, 91-93% on 15L NRB).     Recommendations:   #Acute Hypoxic Respiratory Failure   Respiratory failure 2/2 to COVID-19 (positive test 02/21).    ICU consulted on 02/21 and patient was on NRB (97%, so weaned back down to 2L NC 92%). Currently with worsening O2 requirements (now 87-88% on 6L NC, 91-93% on 15L NRB). In NAD and with no accessory muscle use, but becomes tachypneic when speaking, still able to speak in full sentences.   - ABG (on 6L NC): respiratory alkalosis with hypoxia (pO2 52, O2 sat 89%)   - s/p 40 mg IV Lasix x1 on 02/21   - chest x-ray worse today compared to yesterday   - will likely need HFNC at this point   - continue to encourage incentive spirometry (patient states she has been using it)   - plan for step up from [facility] to [facility] Telemetry (2 Deepali)     #Pneumonia 2/2 to COVID PNA  Labs (d-dimer, ferritin, CRP) elevated with CXR demonstrating worsening bilateral infiltrates. COVID-19 positive on 02/21.   - isolation precautions; contact and airborne  - continue trending COVID-19 labs   - continue with HFNC and wean O2 as able   - albuterol MDI; avoid duonebs if needed  - continue Decadron 6mg for full 10 day course (through 02/25)   - continue Remdesivir 100 mg IV daily (through 02/25)     Plan discussed with fellow and Dr. Johnston.

## 2021-02-22 NOTE — PROGRESS NOTE ADULT - PROBLEM SELECTOR PLAN 2
#Acute Hypoxic Respiratory Failure   Respiratory failure likely 2/2 to viral process with high suspicion for COVID-19  Intitally on NRB saturating 97% was able to be weaned down to 2LNC saturating 92%. Currently in NAD, no accessory muscle use, no nasal flaring, able to speak in full sentences.   - wean supplemental oxygen as tolerated  - please ensure patient does incentive spirometry during her hospital stay  - As pt's CXR concerning for some fluid as well, and given that she was "gasping for air" in last few days while being off lasix (previous 40 oral) since the january hospital adm (dc'd due to kidney function impaired at the time). Will give lasix challenge 40 mg IV x1 tonight    #Metabolic alkalosis  -Patient had been discharged in previous January adm on Na Bicarb in setting of HAGMa from renal failure. Renal function found to be recovered on this adm, with no AG. This metabolic alkalosis likely iatrogenic from the Na bicarb.   -Will not continue sodium bicarbonate anymore

## 2021-02-22 NOTE — PROGRESS NOTE ADULT - PROBLEM SELECTOR PLAN 1
Found w/ b/l infiltrates on CXR, and 2/4 SIRS criteria, RR and WBC meeting criteria. Lactate < 2. Procal low, low suspicion for bacterial process. Sx and labs and imaging c/f COVID infection  - covid swab positive 2/21  - F/u bcx, ucx, monitor fever curve    #Pneumonia 2/2 to COVID PNA  Labs (d-dimer, ferritin, crp ) elevated with CXR demonstrating bilateral infiltrates; coming from [facility]. Strong suspicion for COVID-19.   - swab was positive  - wean supplemental oxygen as tolerated  - Isolation precautions; contact and airborne  - trend COVID-19 labs; if required  - Monitor O2 saturation, monitor for respiratory distress  - albuterol MDI; avoid duonebs if needed  - c/w Decadron 6mg for 10 day course (do 5 more days as pt has been on since 2/16)  - initiate Remdesevir taper, approved 2/21 2/4 SIRS (RR and WBC) , CXR w/b/l infiltrate. Covid swab positive 2/21 Lactate < 2. Procal low, low suspicion for bacterial process.   - F/u bcx, ucx, monitor fever curve    #Pneumonia 2/2 to COVID PNA.   - c/w Decadron 6mg for 10 day course until 2/25 (do 5 more days as pt has been on since 2/16)  - c/w Remdesevir  2/21- 2/25

## 2021-02-22 NOTE — PROGRESS NOTE ADULT - ASSESSMENT
71 yo F with past medical history of  Asthma (Dx after 9/11 attack) HTN, DM, HLD, L hip arthritis, Bipolar Disorder recently admitted for UTI with E. Coli bacteremia that presented from [facility] due to hypoxia, admitted to [facility] for management of COVID infection (+ 2/21), now stepped up to tele for HFNC.

## 2021-02-22 NOTE — CONSULT NOTE ADULT - SUBJECTIVE AND OBJECTIVE BOX
ICU RE-CONSULT NOTE    HPI:  70F with past medical history of asthma (dx after 9/11 attack) HTN, DM, HLD, L hip arthritis, bipolar disorder (w/ chronic RUE tremor 2/2 Abilify), and recent admission for UTI with E. Coli bacteremia (complicated by sepsis, with acute kidney injury, rhabdomyolysis, and transaminitis 01/24/21-01/29/21, now resolved). Patient was discharged from [facility] to subacute rehab facility, presenting this admission from [facility] rehab due to SOB and hypoxia. Upon admission, patient described that she had been having SOB and mild cough starting a week ago (with some nighttime awakenings, gasping for air) and was told she had "viral PNA". She was treated decadron 8mg (started on 2/16/21) and Levaquin (finished full course). Pt reports minimal to no improvement in her symptoms and was noted to by hypoxic on AM vitals on 02/21, so ICU team was consulted. Pt was placed on NRB with improvement to 93% and weaned back down to NC, so was recommended to stay on floors. At that time, patient stated that when she does not have supplemental oxygen she feels like is gasping for air. States since her symptoms started her asthma has flared as well, requiring nebulizer treatments which do help in addition to incentive spirometry. ICU team re-consulted today for hypoxia on 6L NC, with maximal improvement to 93% on 15L NRB.     Current vitals: temp 97.4, HR 88 (artifact due to tremor), /76, RR 20, O2 sat 92% on 15L NRB (87-88% on 6L)  ABG (on 6L NC): pH 7.55, pCO2 34, pO2 52, bicarb 28, O2 sat 89%   Chest x-ray: increased bilateral fluffy opacities on CXR 02/22 compared to 02/21     Subjective:   Patient seen and examined at bedside on 4 Uris. States that she feels her breathing is about the same as yesterday, not appreciably worse but that she is very SOB when on 6L NC. Feels better when on 15L NRB. Has no acute complaints at this time otherwise. Is very concerned about her 94 year-old mother who is at home alone and wishes for us to speak with her legal HCP. Patient denies nausea, vomiting, diarrhea, constipation, fever, chills, night sweats, wheezing, chest pain, dysuria, or increased urinary frequency. States that if she were to need to be intubated or resuscitated, she would want both and that her legal HCP who is her friend and  has more information on this.     ROS:  Otherwise negative, except as specified in HPI.    PAST MEDICAL & SURGICAL HISTORY:  HLD (hyperlipidemia)    Bipolar disorder  Arthritis of left hip  Asthma  Diabetes mellitus  HTN (hypertension)  No significant past surgical history    ALLERGIES:  Allergies  No Known Allergies  Intolerances    MEDICATIONS:  Home Medications:  ARIPiprazole 20 mg oral tablet: 1 tab(s) orally once a day (25 Jan 2021 11:01)  insulin lispro 100 units/mL injectable solution: Sliding scale coverage three times a day with meals and before bedtime   Sugar 150-200: 2 units  201-250: 4 units   251-300: 6 units  301-350: 8 units (29 Jan 2021 16:06)  ipratropium-albuterol 0.5 mg-2.5 mg/3 mLinhalation solution: 3 milliliter(s) inhaled every 4 hours (29 Jan 2021 16:06)  pantoprazole 40 mg oral delayed release tablet: 1 tab(s) orally once a day (before a meal) (29 Jan 2021 16:06)  rosuvastatin 5 mg oral tablet: 1 tab(s) orally once a day (25 Jan 2021 11:01)  simethicone 80 mg oral tablet, chewable: 1 tab(s) orally once a day, As needed, Gas (29 Jan 2021 16:06)  sodium bicarbonate 650 mg oral tablet: 1 tab(s) orally every 12 hours (29 Jan 2021 16:06) (21 Feb 2021 16:11)    Allergies    No Known Allergies    Intolerances      Home Medications:  ARIPiprazole 20 mg oral tablet: 1 tab(s) orally once a day (25 Jan 2021 11:01)  insulin lispro 100 units/mL injectable solution: Sliding scale coverage three times a day with meals and before bedtime   Sugar 150-200: 2 units  201-250: 4 units   251-300: 6 units  301-350: 8 units (29 Jan 2021 16:06)  ipratropium-albuterol 0.5 mg-2.5 mg/3 mLinhalation solution: 3 milliliter(s) inhaled every 4 hours (29 Jan 2021 16:06)  PriLOSEC OTC 20 mg oral delayed release tablet: 1 tab(s) orally once a day (21 Feb 2021 19:04)  simethicone 80 mg oral tablet, chewable: 1 tab(s) orally once a day, As needed, Gas (29 Jan 2021 16:06)  simvastatin 10 mg oral tablet: 1 tab(s) orally once a day (at bedtime) (21 Feb 2021 19:05)  sodium bicarbonate 650 mg oral tablet: 1 tab(s) orally every 12 hours (29 Jan 2021 16:06)    MEDICATIONS:  MEDICATIONS  (STANDING):  ALBUTerol    90 MICROgram(s) HFA Inhaler 2 Puff(s) Inhalation every 6 hours  ARIPiprazole 20 milliGRAM(s) Oral daily  dexAMETHasone     Tablet 6 milliGRAM(s) Oral daily  dextrose 40% Gel 15 Gram(s) Oral once  dextrose 5%. 1000 milliLiter(s) (50 mL/Hr) IV Continuous <Continuous>  dextrose 5%. 1000 milliLiter(s) (100 mL/Hr) IV Continuous <Continuous>  dextrose 50% Injectable 25 Gram(s) IV Push once  dextrose 50% Injectable 12.5 Gram(s) IV Push once  dextrose 50% Injectable 25 Gram(s) IV Push once  enoxaparin Injectable 40 milliGRAM(s) SubCutaneous every 12 hours  glucagon  Injectable 1 milliGRAM(s) IntraMuscular once  insulin lispro (ADMELOG) corrective regimen sliding scale   SubCutaneous Before meals and at bedtime  magnesium sulfate  IVPB 2 Gram(s) IV Intermittent every 2 hours  pantoprazole    Tablet 40 milliGRAM(s) Oral before breakfast  remdesivir  IVPB   IV Intermittent   remdesivir  IVPB 100 milliGRAM(s) IV Intermittent every 24 hours  simvastatin 10 milliGRAM(s) Oral at bedtime  tiotropium 18 MICROgram(s) Capsule 1 Capsule(s) Inhalation daily    MEDICATIONS  (PRN):  simethicone 80 milliGRAM(s) Chew daily PRN Gas    PAST MEDICAL & SURGICAL HISTORY:  HLD (hyperlipidemia)    Bipolar disorder    Arthritis of left hip    Asthma    Diabetes mellitus    HTN (hypertension)    No significant past surgical history      FAMILY HISTORY:  No pertinent family history in first degree relatives      SOCIAL HISTORY:  [place]: [ ] Current, [ ] Former, [ ] Never; Pack Years:  Alcohol:  Illicit Drugs:    Vital Signs Last 24 Hrs  T(C): 36.4 (22 Feb 2021 10:39), Max: 38.2 (21 Feb 2021 12:00)  T(F): 97.5 (22 Feb 2021 10:39), Max: 100.8 (21 Feb 2021 12:00)  HR: 88 (22 Feb 2021 10:39) (61 - 88)  BP: 155/76 (22 Feb 2021 10:39) (113/80 - 166/73)  BP(mean): --  RR: 20 (22 Feb 2021 10:39) (18 - 22)  SpO2: 92% (22 Feb 2021 10:39) (85% - 99%)    02-21 @ 07:01  -  02-22 @ 07:00  --------------------------------------------------------  IN: 300 mL / OUT: 800 mL / NET: -500 mL      PHYSICAL EXAM:  General: Well developed; well nourished; mildy tachypneic when talking but in no acute distress  Eyes: Anicteric sclerae, moist conjunctivae  HENT: Moist mucous membranes  Neck: Trachea midline, supple  Lungs: increased respiratory effort, crackles b/l, no wheezing, rhonchi, or rales, tachypnea when speaking but able to converse in full sentences   Cardiovascular: RRR, +S1, +S2, no murmurs, gallops, or rubs   Abdomen: Soft, non-tender non-distended; Normal bowel sounds; No rebound or guarding  Extremities: Normal range of motion, No clubbing, cyanosis, slight edema b/l but minimal   Neurological: Alert and oriented x3, no focal deficits, moves all extremities spontaneously RUE tremor *(chronic)   Skin: Warm and dry. No obvious rash    LABS:                        9.2    11.56 )-----------( 277      ( 22 Feb 2021 06:42 )             28.3     02-22    140  |  100  |  18  ----------------------------<  259<H>  4.0   |  29  |  0.62    Ca    9.2      22 Feb 2021 06:42  Phos  2.5     02-22  Mg     1.3     02-22    TPro  6.2  /  Alb  2.8<L>  /  TBili  0.3  /  DBili  x   /  AST  16  /  ALT  13  /  AlkPhos  72  02-22      Culture Results:   No growth at 12 hours (02-21 @ 15:23)  Culture Results:   No growth at 12 hours (02-21 @ 15:23)    PT/INR - ( 21 Feb 2021 12:08 )   PT: 20.4 sec;   INR: 1.75          PTT - ( 21 Feb 2021 12:08 )  PTT:32.4 sec    Culture - Blood (collected 21 Feb 2021 15:23)  Source: .Blood Blood-Peripheral  Preliminary Report (22 Feb 2021 04:00):    No growth at 12 hours    Culture - Blood (collected 21 Feb 2021 15:23)  Source: .Blood Blood-Peripheral  Preliminary Report (22 Feb 2021 04:00):    No growth at 12 hours      RADIOLOGY & ADDITIONAL STUDIES:     Reviewed

## 2021-02-22 NOTE — PROGRESS NOTE ADULT - PROBLEM SELECTOR PLAN 3
Hemoglobin 9.2 on this adm, with hemoglobin in 9s-10s at end of previous recent admission. No active signs of bleeding, though INR 1.75 on adm.   -F/u iron studies, retic count    #Elevated INR  -INR 1.75 on admission. Has been getting apixaban 2.5 mg BID for DVT ppx while at [facility] rehab, since 2/16. Will not continue eliquis as will start on lovenox while at [facility] approx 9.. No active signs of bleeding. Ferritin elevated, haptoglobin, transferritin decreased, retic hypoproliferative      #Elevated INR  INR 1.75 as on apixaban 2.5 mg BID since 2/16 for DVT ppx while at [facility] rehab.  - c/w lovenox dvt ppx

## 2021-02-22 NOTE — PROGRESS NOTE ADULT - PROBLEM SELECTOR PLAN 6
-Continue home medication abilify    #B/l arm tremors  -Per patient, 2/2 to her abilify as a medication side effect, monitor

## 2021-02-22 NOTE — CONSULT NOTE ADULT - SUBJECTIVE AND OBJECTIVE BOX
Patient is a 70y old  Female who presents with a chief complaint of COVID (2021 06:25)       HPI:  71 yo F with past medical history of  Asthma (Dx after  attack) HTN, DM, HLD, L hip arthritis, Bipolar Disorder recently admitted for UTI with E. Coli bacteremia (complicated by sepsis, with acute kidney injury, rhabdomyolysis, and transaminitis, dc'd to subacute rehab facility, now presenting from [facility] rehab due to SOB and hypoxia. Pt states she has been having SOB and mild cough starting a week ago (with some nighttime awakenings, gasping for air) and was told she had "viral PNA". She was treated decadron 8mg (started on 21) and levaquin (finished today). Pt reports minimal to no improvement in her symptoms and was noted to by hypoxic on vitals this AM. Pt was placed on NRB with improvement to 93%. Pt states that when she does not have supplemental oxygen she feels like is gasping for air. States since her symptoms started her asthma has flared as well, requiring nebulizer treatments which do help. Right now denies any chest tightness or need for nebulizers. Denies any nausea, vomiting, diarrhea, abdominal pain, back pain, or flank pain. Pt states she does have tremors as a side affect from her abilify, and when she began to felt sick her tremors worsened.    ED Course  Vitals: T 98.9, HR 86, /80, RR 22, Spo2 85% on RA  Labs: Notable for WBC 12.46, Hb 9.2, lymphocyte % 4.7, INR 1.75, , Lactate 1.3 (<2), procal 0.06, BNP 2034, VBG 7.49, Pc02 40, Po2 44, Hc03 30  Imaging: CXR mild cardiomegaly. Bilateral infiltrates. Degenerative changes thoracic spine  Received tyl 650 x1.   ICU initially consulted for hypoxia as pt was put on NRB. Was assessed, found to be in no respiratory distress, saturating 92-94%, with decision that COVID icu/tele monitoring not needed at this time.     ROS:  Otherwise negative, except as specified in HPI.  PMH:  PAST MEDICAL & SURGICAL HISTORY:  HLD (hyperlipidemia)    Bipolar disorder  Arthritis of left hip  Asthma  Diabetes mellitus  HTN (hypertension)  No significant past surgical history    ALLERGIES:  Allergies  No Known Allergies  Intolerances    MEDICATIONS:  Home Medications:  ARIPiprazole 20 mg oral tablet: 1 tab(s) orally once a day (2021 11:01)  insulin lispro 100 units/mL injectable solution: Sliding scale coverage three times a day with meals and before bedtime   Sugar 150-200: 2 units  201-250: 4 units   251-300: 6 units  301-350: 8 units (2021 16:06)  ipratropium-albuterol 0.5 mg-2.5 mg/3 mLinhalation solution: 3 milliliter(s) inhaled every 4 hours (2021 16:06)  pantoprazole 40 mg oral delayed release tablet: 1 tab(s) orally once a day (before a meal) (2021 16:06)  rosuvastatin 5 mg oral tablet: 1 tab(s) orally once a day (2021 11:01)  simethicone 80 mg oral tablet, chewable: 1 tab(s) orally once a day, As needed, Gas (2021 16:06)  sodium bicarbonate 650 mg oral tablet: 1 tab(s) orally every 12 hours (2021 16:06) (2021 16:11)      PAST MEDICAL & SURGICAL HISTORY:  HLD (hyperlipidemia)    Bipolar disorder    Arthritis of left hip    Asthma    Diabetes mellitus    HTN (hypertension)    No significant past surgical history        MEDICATIONS  (STANDING):  ALBUTerol    90 MICROgram(s) HFA Inhaler 2 Puff(s) Inhalation every 6 hours  ARIPiprazole 20 milliGRAM(s) Oral daily  dexAMETHasone     Tablet 6 milliGRAM(s) Oral daily  dextrose 40% Gel 15 Gram(s) Oral once  dextrose 5%. 1000 milliLiter(s) (50 mL/Hr) IV Continuous <Continuous>  dextrose 5%. 1000 milliLiter(s) (100 mL/Hr) IV Continuous <Continuous>  dextrose 50% Injectable 25 Gram(s) IV Push once  dextrose 50% Injectable 12.5 Gram(s) IV Push once  dextrose 50% Injectable 25 Gram(s) IV Push once  enoxaparin Injectable 40 milliGRAM(s) SubCutaneous every 12 hours  glucagon  Injectable 1 milliGRAM(s) IntraMuscular once  insulin lispro (ADMELOG) corrective regimen sliding scale   SubCutaneous Before meals and at bedtime  magnesium sulfate  IVPB 2 Gram(s) IV Intermittent every 2 hours  pantoprazole    Tablet 40 milliGRAM(s) Oral before breakfast  remdesivir  IVPB   IV Intermittent   remdesivir  IVPB 100 milliGRAM(s) IV Intermittent every 24 hours  simvastatin 10 milliGRAM(s) Oral at bedtime  tiotropium 18 MICROgram(s) Capsule 1 Capsule(s) Inhalation daily    MEDICATIONS  (PRN):  simethicone 80 milliGRAM(s) Chew daily PRN Gas      FAMILY HISTORY:  No pertinent family history in first degree relatives        CBC Full  -  ( 2021 06:42 )  WBC Count : 11.56 K/uL  RBC Count : 3.26 M/uL  Hemoglobin : 9.2 g/dL  Hematocrit : 28.3 %  Platelet Count - Automated : 277 K/uL  Mean Cell Volume : 86.8 fl  Mean Cell Hemoglobin : 28.2 pg  Mean Cell Hemoglobin Concentration : 32.5 gm/dL  Auto Neutrophil # : 9.25 K/uL  Auto Lymphocyte # : 1.81 K/uL  Auto Monocyte # : 0.50 K/uL  Auto Eosinophil # : 0.00 K/uL  Auto Basophil # : 0.00 K/uL  Auto Neutrophil % : 80.0 %  Auto Lymphocyte % : 15.7 %  Auto Monocyte % : 4.3 %  Auto Eosinophil % : 0.0 %  Auto Basophil % : 0.0 %          140  |  100  |  18  ----------------------------<  259<H>  4.0   |  29  |  0.62    Ca    9.2      2021 06:42  Phos  2.5       Mg     1.3         TPro  6.2  /  Alb  2.8<L>  /  TBili  0.3  /  DBili  x   /  AST  16  /  ALT  13  /  AlkPhos  72        Urinalysis Basic - ( 2021 07:05 )    Color: Yellow / Appearance: Clear / S.015 / pH: x  Gluc: x / Ketone: NEGATIVE  / Bili: Negative / Urobili: 0.2 E.U./dL   Blood: x / Protein: 100 mg/dL / Nitrite: NEGATIVE   Leuk Esterase: Trace / RBC: x / WBC 5-10 /HPF   Sq Epi: x / Non Sq Epi: Moderate /HPF / Bacteria: Present /HPF          Radiology:    < from: Xray Chest 1 View-PORTABLE IMMEDIATE (21 @ 12:48) >    EXAM:  XR CHEST PORTABLE IMMED 1V                          PROCEDURE DATE:  2021          INTERPRETATION:  Clinical History: Fever    Frontal examination of the chest demonstrates mild cardiomegaly. Bilateral infiltrates. Degenerative changesthoracic spine.    Impression: Bilateral infiltrates          Vital Signs Last 24 Hrs  T(C): 36.6 (2021 08:43), Max: 38.2 (2021 12:00)  T(F): 97.8 (2021 08:43), Max: 100.8 (2021 12:00)  HR: 69 (2021 08:43) (61 - 88)  BP: 155/75 (2021 08:43) (113/80 - 166/73)  BP(mean): --  RR: 19 (2021 08:43) (18 - 22)  SpO2: 88% (2021 08:43) (85% - 99%)    REVIEW OF SYSTEMS: as per HPI      Physical Exam:  on COVID isolation, in accordance with current standards limiting patient contact, please refer to exam performed on 2021      Constitutional: Obese, WDWN resting comfortably in bed on 4LNC; NAD  Head: NC/AT  Eyes: PERRL, EOMI, anicteric sclera, no conjunctival pallor   ENT: no nasal discharge; uvula midline, no oropharyngeal erythema or exudates; MM are dry  Neck: supple; no JVD or thyromegaly  Respiratory: Decreased BS throughout due to body habitus, bibasilar crackles; no W/R/R, no retractions  Cardiac: +S1/S2; RRR; no M/R/G; PMI non-displaced  Gastrointestinal: Large abdomen, slightly distended, soft, NT/ND; no rebound or guarding; +BSx4  Back: spine midline, no bony tenderness or step-offs  Extremities: WWP, no clubbing or cyanosis; peripheral edema none-to-trace in b/l LE  Musculoskeletal: NROM x4; no joint swelling, tenderness or erythema  Dermatologic: skin warm, dry and intact; no rashes, wounds, or scars  Lymphatic: no submandibular or cervical LAD  Neurologic: AAOx3; CNII-XII grossly intact; b/l arm tremors that pt said has been from her abilify, worse on R  Psychiatric: affect and characteristics of appearance, verbalizations, behaviors are appropriate      PM&R Impression:    1) deconditioned  2) no focal weakness    Plan: cleared to begin rehab    1) Physical therapy focusing on therapeutic exercises, bed mobility/transfer out of bed evaluation, progressive ambulation with assistive devices prn.    2) Anticipated Disposition Plan/Recs:    return to [facility]/ NH/Rehab

## 2021-02-22 NOTE — PROGRESS NOTE ADULT - PROBLEM SELECTOR PLAN 5
- On lisinopril 40mg at home (though more for leg swelling)  - Was Dc'd on previous january hospital adm i/s/o low BP and FACUNDO  - May continue on this adm, renal function recovered    #Lower extremity edema.    Reports starting Lasix for LE edema. No Dx of CHF.  - Was held on previous admission and discharge as mentioned above    #HLD  -Continue simvastatin 10 mg QD home med

## 2021-02-22 NOTE — PROGRESS NOTE ADULT - PROBLEM SELECTOR PLAN 4
-Symptomatic starting around a week ago. Positive test 2/21  -Initially hypoxic on RA, meeting criteria for remdesivir and dexamethasone, will give remdesivir pending CC approval and continue dexamethasone at 6 mg for 10 day course (previously given 8 mg for 5 days, starting 2/16)

## 2021-02-22 NOTE — PROGRESS NOTE ADULT - PROBLEM SELECTOR PLAN 1
Found w/ b/l infiltrates on CXR, and 2/4 SIRS criteria, RR and WBC meeting criteria. Lactate < 2. Procal low, low suspicion for bacterial process. Sx and labs and imaging c/f COVID infection  - covid swab positive 2/21  -F/u bcx, ucx, monitor fever curve    #Pneumonia 2/2 to COVID PNA  Labs (d-dimer, ferritin, crp ) elevated with CXR demonstrating bilateral infiltrates; coming from [facility]. Strong suspicion for COVID-19.   - swab was positive  - wean supplemental oxygen as tolerated  - Isolation precautions; contact and airborne  - trend COVID-19 labs; if required  - Monitor O2 saturation, monitor for respiratory distress  - albuterol MDI; avoid duonebs if needed  - c/w Decadron 6mg for 10 day course (do 5 more days as pt has been on since 2/16)  - initiate Remdesevir taper, approved 2/21 Found w/ b/l infiltrates on CXR, and 2/4 SIRS criteria, RR and WBC meeting criteria. Lactate < 2. Procal low, low suspicion for bacterial process. Sx and labs and imaging c/f COVID infection  - covid swab positive 2/21  - F/u bcx, ucx, monitor fever curve    #Pneumonia 2/2 to COVID PNA  Labs (d-dimer, ferritin, crp ) elevated with CXR demonstrating bilateral infiltrates; coming from [facility]. Strong suspicion for COVID-19.   - swab was positive  - wean supplemental oxygen as tolerated  - Isolation precautions; contact and airborne  - trend COVID-19 labs; if required  - Monitor O2 saturation, monitor for respiratory distress  - albuterol MDI; avoid duonebs if needed  - c/w Decadron 6mg for 10 day course (do 5 more days as pt has been on since 2/16)  - initiate Remdesevir taper, approved 2/21

## 2021-02-22 NOTE — PROGRESS NOTE ADULT - PROBLEM SELECTOR PLAN 2
#Acute Hypoxic Respiratory Failure   Respiratory failure likely 2/2 to viral process with high suspicion for COVID-19  Intitally on NRB saturating 97% was able to be weaned down to 2LNC saturating 92%. Currently in NAD, no accessory muscle use, no nasal flaring, able to speak in full sentences.   - wean supplemental oxygen as tolerated  - please ensure patient does incentive spirometry during her hospital stay  - As pt's CXR concerning for some fluid as well, and given that she was "gasping for air" in last few days while being off lasix (previous 40 oral) since the january hospital adm (dc'd due to kidney function impaired at the time). Will give lasix challenge 40 mg IV x1 tonight    #Metabolic alkalosis  -Patient had been discharged in previous January adm on Na Bicarb in setting of [facility] from renal failure. Renal function found to be recovered on this adm, with no AG. This metabolic alkalosis likely iatrogenic from the Na bicarb.   -Will not continue sodium bicarbonate anymore #Acute Hypoxic Respiratory Failure   Due to COVID as per above. Lasix 40mg PO d/c'd during January hospital admission due to FACUNDO.   - consider restarting lasix 40mg PO    #Metabolic alkalosis  Patient had been discharged in previous January adm on Na Bicarb in setting of [facility] from renal failure. Renal function found to be recovered on this adm, with no AG. This metabolic alkalosis likely iatrogenic from the Na bicarb.   - hold na bicarb

## 2021-02-22 NOTE — PROGRESS NOTE ADULT - SUBJECTIVE AND OBJECTIVE BOX
IN PROGRESS, INCLUDING ASSESSMENT AND PLAN INCOMPLETE, INCLUDING ASSESSMENT AND PLAN  INTERVAL HPI/OVERNIGHT EVENTS:      On further ROS, patient did not have complaint of: Headache, Blurred Vision, Cough, Dyspnea, Palpitations, Abdominal Pain, N/V, Weakness, Change in Sensation.     VITAL SIGNS:  T(F): 97.8 (21 @ 08:43)  HR: 69 (21 @ 08:43)  BP: 155/75 (21 @ 08:43)  RR: 20 (21 @ 08:43)  SpO2: 88% (21 @ 08:43)  Wt(kg): --    Input & Output:    21 @ 07:01  -  21 @ 07:00  --------------------------------------------------------  IN: 300 mL / OUT: 800 mL / NET: -500 mL        PHYSICAL EXAM:  General: Comfortable, pleasant/anxious/agitated, Ill-appearing, well-nourished/frail/cachectic, comfortable / in distress  Neurological: AAOx3, no focal deficits  HEENT: NC/AT; EOMI, PERRL, clear conjunctiva, no nasal or oropharyngeal discharge or exudates, MMM  Neck: supple, no cervical or post-auricular lymphadenopathy  Cardiovascular: +S1/S2, no murmurs/rubs/gallops, RRR  Respiratory: CTA B/L, no diminished breath sounds, no wheezes/rales/rhonchi, no increased work of breathing or accessory muscle use  Gastrointestinal: soft, NT/ND; active BSx4 quadrants  Genitourinary: no suprapubic tenderness, no CVA tenderness  Extremities: WWP; no edema, clubbing or cyanosis  Vascular: 2+ radial, DP/PT pulses B/L  Skin: no rashes  Lines/Drains:     MEDICATIONS  (STANDING):  ALBUTerol    90 MICROgram(s) HFA Inhaler 2 Puff(s) Inhalation every 6 hours  ARIPiprazole 20 milliGRAM(s) Oral daily  dexAMETHasone     Tablet 6 milliGRAM(s) Oral daily  dextrose 40% Gel 15 Gram(s) Oral once  dextrose 5%. 1000 milliLiter(s) (50 mL/Hr) IV Continuous <Continuous>  dextrose 5%. 1000 milliLiter(s) (100 mL/Hr) IV Continuous <Continuous>  dextrose 50% Injectable 25 Gram(s) IV Push once  dextrose 50% Injectable 12.5 Gram(s) IV Push once  dextrose 50% Injectable 25 Gram(s) IV Push once  enoxaparin Injectable 40 milliGRAM(s) SubCutaneous every 12 hours  glucagon  Injectable 1 milliGRAM(s) IntraMuscular once  insulin lispro (ADMELOG) corrective regimen sliding scale   SubCutaneous Before meals and at bedtime  magnesium sulfate  IVPB 2 Gram(s) IV Intermittent every 2 hours  pantoprazole    Tablet 40 milliGRAM(s) Oral before breakfast  remdesivir  IVPB   IV Intermittent   remdesivir  IVPB 100 milliGRAM(s) IV Intermittent every 24 hours  simvastatin 10 milliGRAM(s) Oral at bedtime  tiotropium 18 MICROgram(s) Capsule 1 Capsule(s) Inhalation daily    MEDICATIONS  (PRN):  simethicone 80 milliGRAM(s) Chew daily PRN Gas      Allergies    No Known Allergies    Intolerances        LABS:                        9.2    11.56 )-----------( 277      ( 2021 06:42 )             28.3     02-    140  |  100  |  18  ----------------------------<  259<H>  4.0   |  29  |  0.62    Ca    9.2      2021 06:42  Phos  2.5     -  Mg     1.3         TPro  6.2  /  Alb  2.8<L>  /  TBili  0.3  /  DBili  x   /  AST  16  /  ALT  13  /  AlkPhos  72  02-22    PT/INR - ( 2021 12:08 )   PT: 20.4 sec;   INR: 1.75          PTT - ( 2021 12:08 )  PTT:32.4 sec  Urinalysis Basic - ( 2021 07:05 )    Color: Yellow / Appearance: Clear / S.015 / pH: x  Gluc: x / Ketone: NEGATIVE  / Bili: Negative / Urobili: 0.2 E.U./dL   Blood: x / Protein: 100 mg/dL / Nitrite: NEGATIVE   Leuk Esterase: Trace / RBC: x / WBC 5-10 /HPF   Sq Epi: x / Non Sq Epi: Moderate /HPF / Bacteria: Present /HPF        RADIOLOGY & ADDITIONAL TESTS:   INCOMPLETE, INCLUDING ASSESSMENT AND PLAN  INTERVAL HPI/OVERNIGHT EVENTS:  69 yo F with past medical history of  Asthma (Dx after  attack) HTN, DM, HLD, L hip arthritis, Bipolar Disorder recently admitted for UTI with E. Coli bacteremia (complicated by sepsis, with acute kidney injury, rhabdomyolysis, and transaminitis, dc'd to subacute rehab facility, now presenting from [facility] rehab due to SOB and hypoxia. Pt states she has been having SOB and mild cough starting a week ago (with some nighttime awakenings, gasping for air) and was told she had "viral PNA". She was treated decadron 8mg (started on 21) and levaquin (finished today). Pt reports minimal to no improvement in her symptoms and was noted to by hypoxic on vitals this AM. Pt was placed on NRB with improvement to 93%. Pt states that when she does not have supplemental oxygen she feels like is gasping for air. States since her symptoms started her asthma has flared as well, requiring nebulizer treatments which do help. Right now denies any chest tightness or need for nebulizers. Denies any nausea, vomiting, diarrhea, abdominal pain, back pain, or flank pain. Pt states she does have tremors as a side affect from her abilify, and when she began to felt sick her tremors worsened.    ED Course  Vitals: T 98.9, HR 86, /80, RR 22, Spo2 85% on RA  Labs: Notable for WBC 12.46, Hb 9.2, lymphocyte % 4.7, INR 1.75, , Lactate 1.3 (<2), procal 0.06, BNP 2034, VBG 7.49, Pc02 40, Po2 44, Hc03 30  Imaging: CXR mild cardiomegaly. Bilateral infiltrates. Degenerative changes thoracic spine  Received tyl 650 x1.   ICU initially consulted for hypoxia as pt was put on NRB. Was assessed, found to be in no respiratory distress, saturating 92-94%, with decision that COVID icu/tele monitoring not needed at this time.     On further ROS, patient did not have complaint of: Headache, Blurred Vision, Cough, Dyspnea, Palpitations, Abdominal Pain, N/V, Weakness, Change in Sensation.     VITAL SIGNS:  T(F): 97.8 (21 @ 08:43)  HR: 69 (21 @ 08:43)  BP: 155/75 (21 @ 08:43)  RR: 20 (21 @ 08:43)  SpO2: 88% (21 @ 08:43)  Wt(kg): --    Input & Output:    21 @ 07:01  -  21 @ 07:00  --------------------------------------------------------  IN: 300 mL / OUT: 800 mL / NET: -500 mL        PHYSICAL EXAM:  General: Comfortable, pleasant/anxious/agitated, Ill-appearing, well-nourished/frail/cachectic, comfortable / in distress  Neurological: AAOx3, no focal deficits  HEENT: NC/AT; EOMI, PERRL, clear conjunctiva, no nasal or oropharyngeal discharge or exudates, MMM  Neck: supple, no cervical or post-auricular lymphadenopathy  Cardiovascular: +S1/S2, no murmurs/rubs/gallops, RRR  Respiratory: CTA B/L, no diminished breath sounds, no wheezes/rales/rhonchi, no increased work of breathing or accessory muscle use  Gastrointestinal: soft, NT/ND; active BSx4 quadrants  Genitourinary: no suprapubic tenderness, no CVA tenderness  Extremities: WWP; no edema, clubbing or cyanosis  Vascular: 2+ radial, DP/PT pulses B/L  Skin: no rashes  Lines/Drains:     MEDICATIONS  (STANDING):  ALBUTerol    90 MICROgram(s) HFA Inhaler 2 Puff(s) Inhalation every 6 hours  ARIPiprazole 20 milliGRAM(s) Oral daily  dexAMETHasone     Tablet 6 milliGRAM(s) Oral daily  dextrose 40% Gel 15 Gram(s) Oral once  dextrose 5%. 1000 milliLiter(s) (50 mL/Hr) IV Continuous <Continuous>  dextrose 5%. 1000 milliLiter(s) (100 mL/Hr) IV Continuous <Continuous>  dextrose 50% Injectable 25 Gram(s) IV Push once  dextrose 50% Injectable 12.5 Gram(s) IV Push once  dextrose 50% Injectable 25 Gram(s) IV Push once  enoxaparin Injectable 40 milliGRAM(s) SubCutaneous every 12 hours  glucagon  Injectable 1 milliGRAM(s) IntraMuscular once  insulin lispro (ADMELOG) corrective regimen sliding scale   SubCutaneous Before meals and at bedtime  magnesium sulfate  IVPB 2 Gram(s) IV Intermittent every 2 hours  pantoprazole    Tablet 40 milliGRAM(s) Oral before breakfast  remdesivir  IVPB   IV Intermittent   remdesivir  IVPB 100 milliGRAM(s) IV Intermittent every 24 hours  simvastatin 10 milliGRAM(s) Oral at bedtime  tiotropium 18 MICROgram(s) Capsule 1 Capsule(s) Inhalation daily    MEDICATIONS  (PRN):  simethicone 80 milliGRAM(s) Chew daily PRN Gas      Allergies    No Known Allergies    Intolerances        LABS:                        9.2    11.56 )-----------( 277      ( 2021 06:42 )             28.3         140  |  100  |  18  ----------------------------<  259<H>  4.0   |  29  |  0.62    Ca    9.2      2021 06:42  Phos  2.5       Mg     1.3         TPro  6.2  /  Alb  2.8<L>  /  TBili  0.3  /  DBili  x   /  AST  16  /  ALT  13  /  AlkPhos  72  -    PT/INR - ( 2021 12:08 )   PT: 20.4 sec;   INR: 1.75          PTT - ( 2021 12:08 )  PTT:32.4 sec  Urinalysis Basic - ( 2021 07:05 )    Color: Yellow / Appearance: Clear / S.015 / pH: x  Gluc: x / Ketone: NEGATIVE  / Bili: Negative / Urobili: 0.2 E.U./dL   Blood: x / Protein: 100 mg/dL / Nitrite: NEGATIVE   Leuk Esterase: Trace / RBC: x / WBC 5-10 /HPF   Sq Epi: x / Non Sq Epi: Moderate /HPF / Bacteria: Present /HPF        RADIOLOGY & ADDITIONAL TESTS:   INCOMPLETE, INCLUDING ASSESSMENT AND PLAN  INTERVAL HPI/OVERNIGHT EVENTS:  71 yo F with past medical history of Asthma (Dx after  attack) HTN, DM, HLD, L hip arthritis, Bipolar Disorder recently admitted for UTI with E. Coli bacteremia (complicated by sepsis, with acute kidney injury, rhabdomyolysis, and transaminitis), dc'd to subacute rehab facility, now presenting from [facility] rehab due to SOB x1 wk and hypoxia whic worsened . Pt states she has been having SOB and mild cough starting a week ago (with some nighttime awakenings, gasping for air) and was told she had "viral PNA". She was treated decadron 8mg (started on 21) and levaquin (finished today). Pt reports minimal to no improvement in her symptoms and was noted to by hypoxic on vitals this AM. Pt was placed on NRB with improvement to 93%. Pt states that when she does not have supplemental oxygen she feels like is gasping for air. States since her symptoms started her asthma has flared as well, requiring nebulizer treatments which do help. Right now denies any chest tightness or need for nebulizers. Denies any nausea, vomiting, diarrhea, abdominal pain, back pain, or flank pain. Pt states she does have tremors as a side affect from her abilify, and when she began to felt sick her tremors worsened.    ED Course  Vitals: T 98.9, HR 86, /80, RR 22, Spo2 85% on RA  Labs: Notable for WBC 12.46, Hb 9.2, lymphocyte % 4.7, INR 1.75, , Lactate 1.3 (<2), procal 0.06, BNP 2034, VBG 7.49, Pc02 40, Po2 44, Hc03 30  Imaging: CXR mild cardiomegaly. Bilateral infiltrates. Degenerative changes thoracic spine  Received tyl 650 x1.   ICU initially consulted for hypoxia as pt was put on NRB. Was assessed, found to be in no respiratory distress, saturating 92-94%, with decision that COVID icu/tele monitoring not needed at this time.     On further ROS, patient did not have complaint of: Headache, Blurred Vision, Cough, Dyspnea, Palpitations, Abdominal Pain, N/V, Weakness, Change in Sensation.     VITAL SIGNS:  T(F): 97.8 (21 @ 08:43)  HR: 69 (21 @ 08:43)  BP: 155/75 (21 @ 08:43)  RR: 20 (21 @ 08:43)  SpO2: 88% (21 @ 08:43)  Wt(kg): --    Input & Output:    21 @ 07:01  -  21 @ 07:00  --------------------------------------------------------  IN: 300 mL / OUT: 800 mL / NET: -500 mL        PHYSICAL EXAM:  General: Comfortable, pleasant/anxious/agitated, Ill-appearing, well-nourished/frail/cachectic, comfortable / in distress  Neurological: AAOx3, no focal deficits  HEENT: NC/AT; EOMI, PERRL, clear conjunctiva, no nasal or oropharyngeal discharge or exudates, MMM  Neck: supple, no cervical or post-auricular lymphadenopathy  Cardiovascular: +S1/S2, no murmurs/rubs/gallops, RRR  Respiratory: CTA B/L, no diminished breath sounds, no wheezes/rales/rhonchi, no increased work of breathing or accessory muscle use  Gastrointestinal: soft, NT/ND; active BSx4 quadrants  Genitourinary: no suprapubic tenderness, no CVA tenderness  Extremities: WWP; no edema, clubbing or cyanosis  Vascular: 2+ radial, DP/PT pulses B/L  Skin: no rashes  Lines/Drains:     MEDICATIONS  (STANDING):  ALBUTerol    90 MICROgram(s) HFA Inhaler 2 Puff(s) Inhalation every 6 hours  ARIPiprazole 20 milliGRAM(s) Oral daily  dexAMETHasone     Tablet 6 milliGRAM(s) Oral daily  dextrose 40% Gel 15 Gram(s) Oral once  dextrose 5%. 1000 milliLiter(s) (50 mL/Hr) IV Continuous <Continuous>  dextrose 5%. 1000 milliLiter(s) (100 mL/Hr) IV Continuous <Continuous>  dextrose 50% Injectable 25 Gram(s) IV Push once  dextrose 50% Injectable 12.5 Gram(s) IV Push once  dextrose 50% Injectable 25 Gram(s) IV Push once  enoxaparin Injectable 40 milliGRAM(s) SubCutaneous every 12 hours  glucagon  Injectable 1 milliGRAM(s) IntraMuscular once  insulin lispro (ADMELOG) corrective regimen sliding scale   SubCutaneous Before meals and at bedtime  magnesium sulfate  IVPB 2 Gram(s) IV Intermittent every 2 hours  pantoprazole    Tablet 40 milliGRAM(s) Oral before breakfast  remdesivir  IVPB   IV Intermittent   remdesivir  IVPB 100 milliGRAM(s) IV Intermittent every 24 hours  simvastatin 10 milliGRAM(s) Oral at bedtime  tiotropium 18 MICROgram(s) Capsule 1 Capsule(s) Inhalation daily    MEDICATIONS  (PRN):  simethicone 80 milliGRAM(s) Chew daily PRN Gas      Allergies    No Known Allergies    Intolerances        LABS:                        9.2    11.56 )-----------( 277      ( 2021 06:42 )             28.3     02-    140  |  100  |  18  ----------------------------<  259<H>  4.0   |  29  |  0.62    Ca    9.2      2021 06:42  Phos  2.5     -  Mg     1.3         TPro  6.2  /  Alb  2.8<L>  /  TBili  0.3  /  DBili  x   /  AST  16  /  ALT  13  /  AlkPhos  72  -    PT/INR - ( 2021 12:08 )   PT: 20.4 sec;   INR: 1.75          PTT - ( 2021 12:08 )  PTT:32.4 sec  Urinalysis Basic - ( 2021 07:05 )    Color: Yellow / Appearance: Clear / S.015 / pH: x  Gluc: x / Ketone: NEGATIVE  / Bili: Negative / Urobili: 0.2 E.U./dL   Blood: x / Protein: 100 mg/dL / Nitrite: NEGATIVE   Leuk Esterase: Trace / RBC: x / WBC 5-10 /HPF   Sq Epi: x / Non Sq Epi: Moderate /HPF / Bacteria: Present /HPF        RADIOLOGY & ADDITIONAL TESTS:   INCOMPLETE, INCLUDING ASSESSMENT AND PLAN  ***TRANSFER FROM [facility] TO [facility]****  INTERVAL HPI/OVERNIGHT EVENTS:  69 yo F with past medical history of Asthma (Dx after  attack) HTN, DM, HLD, L hip arthritis, Bipolar Disorder recently admitted for UTI with E. Coli bacteremia (complicated by sepsis, with acute kidney injury, rhabdomyolysis, and transaminitis), dc'd to subacute rehab facility, now presenting from [facility] rehab due to SOB, cough, and hypoxia x1 wk c/b subjective worsening of asthma requiring duonebs treatment (s/p decadron 8mg x5 days and levaquin course.) Hypoxia and dyspnea acutely worsened day of admit, 85% on presentation to ED. Pt was placed on NRB in ED with improvement to 93%. Denies any chest tightness or need for nebulizers. Denies any nausea, vomiting, diarrhea, abdominal pain, back pain, or flank pain. ICU consulted in ED, pt in no respiratory distress and able to saturate 92-94% on NC, so approved for admission to [facility] with tele/icu not required at that time. CXR showed b/l infiltrates, labs notable for WBC 12.46, D-dimer 488, BNP 2034, lactate 1.3 and procal 0.06    OSIRIS o/n and this AM pt able to speak in full sentences with no subjective SOB. Physical exam this AM notable for diffuse expiratory wheezes and bibasilar inspiratory crackles posteriorly and laterally. Pt also has baseline b/l UE and LE tremor, more pronounced on the R, which she attributes to her abilify usage. Pt had epsiode of untriggered desat to 85% on 6L NC, no subjective SOB, no tachycardia, no CP and pt in NAD. Pt place on her r side and with incentive spirometry O2 increased to 91%, however immediately decreased back to 86-88% once incentive spirometry halted. LE dopplers ordered and ICU consulted. Pt placed on NRB and monitor, noted to have heart rate vacillating between 71 and 293, likely 2/2 baseline tremors but palpable radial pulse difficult to assess, EKG ordered which showed NSR at 71 bpm.    VITAL SIGNS:  T(F): 97.8 (21 @ 08:43)  HR: 69 (21 @ 08:43)  BP: 155/75 (21 @ 08:43)  RR: 20 (21 @ 08:43)  SpO2: 88% (21 @ 08:43)  Wt(kg): --    Input & Output:    21 @ 07:01  -  21 @ 07:00  --------------------------------------------------------  IN: 300 mL / OUT: 800 mL / NET: -500 mL        PHYSICAL EXAM:  General: Comfortable, pleasant/anxious/agitated, Ill-appearing, well-nourished/frail/cachectic, comfortable / in distress  Neurological: AAOx3, no focal deficits  HEENT: NC/AT; EOMI, PERRL, clear conjunctiva, no nasal or oropharyngeal discharge or exudates, MMM  Neck: supple, no cervical or post-auricular lymphadenopathy  Cardiovascular: +S1/S2, no murmurs/rubs/gallops, RRR  Respiratory: CTA B/L, no diminished breath sounds, no wheezes/rales/rhonchi, no increased work of breathing or accessory muscle use  Gastrointestinal: soft, NT/ND; active BSx4 quadrants  Genitourinary: no suprapubic tenderness, no CVA tenderness  Extremities: WWP; no edema, clubbing or cyanosis  Vascular: 2+ radial, DP/PT pulses B/L  Skin: no rashes  Lines/Drains:     MEDICATIONS  (STANDING):  ALBUTerol    90 MICROgram(s) HFA Inhaler 2 Puff(s) Inhalation every 6 hours  ARIPiprazole 20 milliGRAM(s) Oral daily  dexAMETHasone     Tablet 6 milliGRAM(s) Oral daily  dextrose 40% Gel 15 Gram(s) Oral once  dextrose 5%. 1000 milliLiter(s) (50 mL/Hr) IV Continuous <Continuous>  dextrose 5%. 1000 milliLiter(s) (100 mL/Hr) IV Continuous <Continuous>  dextrose 50% Injectable 25 Gram(s) IV Push once  dextrose 50% Injectable 12.5 Gram(s) IV Push once  dextrose 50% Injectable 25 Gram(s) IV Push once  enoxaparin Injectable 40 milliGRAM(s) SubCutaneous every 12 hours  glucagon  Injectable 1 milliGRAM(s) IntraMuscular once  insulin lispro (ADMELOG) corrective regimen sliding scale   SubCutaneous Before meals and at bedtime  magnesium sulfate  IVPB 2 Gram(s) IV Intermittent every 2 hours  pantoprazole    Tablet 40 milliGRAM(s) Oral before breakfast  remdesivir  IVPB   IV Intermittent   remdesivir  IVPB 100 milliGRAM(s) IV Intermittent every 24 hours  simvastatin 10 milliGRAM(s) Oral at bedtime  tiotropium 18 MICROgram(s) Capsule 1 Capsule(s) Inhalation daily    MEDICATIONS  (PRN):  simethicone 80 milliGRAM(s) Chew daily PRN Gas      Allergies    No Known Allergies    Intolerances        LABS:                        9.2    11.56 )-----------( 277      ( 2021 06:42 )             28.3         140  |  100  |  18  ----------------------------<  259<H>  4.0   |  29  |  0.62    Ca    9.2      2021 06:42  Phos  2.5       Mg     1.3         TPro  6.2  /  Alb  2.8<L>  /  TBili  0.3  /  DBili  x   /  AST  16  /  ALT  13  /  AlkPhos  72      PT/INR - ( 2021 12:08 )   PT: 20.4 sec;   INR: 1.75          PTT - ( 2021 12:08 )  PTT:32.4 sec  Urinalysis Basic - ( 2021 07:05 )    Color: Yellow / Appearance: Clear / S.015 / pH: x  Gluc: x / Ketone: NEGATIVE  / Bili: Negative / Urobili: 0.2 E.U./dL   Blood: x / Protein: 100 mg/dL / Nitrite: NEGATIVE   Leuk Esterase: Trace / RBC: x / WBC 5-10 /HPF   Sq Epi: x / Non Sq Epi: Moderate /HPF / Bacteria: Present /HPF        RADIOLOGY & ADDITIONAL TESTS:   INCOMPLETE, INCLUDING ASSESSMENT AND PLAN  ***TRANSFER FROM [facility] TO [facility]****  INTERVAL HPI/OVERNIGHT EVENTS:  69 yo F with past medical history of Asthma (Dx after  attack) HTN, DM, HLD, L hip arthritis, Bipolar Disorder recently admitted for UTI with E. Coli bacteremia (complicated by sepsis, with acute kidney injury, rhabdomyolysis, and transaminitis), dc'd to subacute rehab facility, now presenting from [facility] rehab due to SOB, cough, and hypoxia x1 wk c/b subjective worsening of asthma requiring duonebs treatment (s/p decadron 8mg x5 days and levaquin course.) Hypoxia and dyspnea acutely worsened day of admit, 85% on presentation to ED. Pt was placed on NRB in ED with improvement to 93%. Denies any chest tightness or need for nebulizers. Denies any nausea, vomiting, diarrhea, abdominal pain, back pain, or flank pain. ICU consulted in ED, pt in no respiratory distress and able to saturate 92-94% on NC, so approved for admission to [facility] with tele/icu not required at that time. CXR showed b/l infiltrates, labs notable for WBC 12.46, D-dimer 488, BNP 2034, lactate 1.3 and procal 0.06    OSIRIS o/n and this AM pt able to speak in full sentences with no subjective SOB. Physical exam this AM notable for diffuse expiratory wheezes and bibasilar inspiratory crackles posteriorly and laterally. Pt also has baseline b/l UE and LE tremor, more pronounced on the R, which she attributes to her abilify usage. Pt had epsiode of untriggered desat to 85% on 6L NC, no subjective SOB, no tachycardia, no CP and pt in NAD. Pt place on her r side and with incentive spirometry O2 increased to 91%, however immediately decreased back to 86-88% once incentive spirometry halted. LE dopplers ordered and ICU consulted. Pt placed on NRB 15 L with O2 sat improved to 94% and placed on monitor, noted to have heart rate vacillating between 65 and 293, likely 2/2 baseline tremors but palpable radial pulse difficult to assess, EKG ordered which showed NSR at 71 bpm.    VITAL SIGNS:  T(F): 97.8 (21 @ 08:43)  HR: 69 (21 @ 08:43)  BP: 155/75 (21 @ 08:43)  RR: 20 (21 @ 08:43)  SpO2: 88% (21 @ 08:43)  Wt(kg): --    Input & Output:    21 @ 07:01  -  21 @ 07:00  --------------------------------------------------------  IN: 300 mL / OUT: 800 mL / NET: -500 mL        PHYSICAL EXAM:  General: Comfortable, pleasant/anxious/agitated, Ill-appearing, well-nourished/frail/cachectic, comfortable / in distress  Neurological: AAOx3, no focal deficits  HEENT: NC/AT; EOMI, PERRL, clear conjunctiva, no nasal or oropharyngeal discharge or exudates, MMM  Neck: supple, no cervical or post-auricular lymphadenopathy  Cardiovascular: +S1/S2, no murmurs/rubs/gallops, RRR  Respiratory: CTA B/L, no diminished breath sounds, no wheezes/rales/rhonchi, no increased work of breathing or accessory muscle use  Gastrointestinal: soft, NT/ND; active BSx4 quadrants  Genitourinary: no suprapubic tenderness, no CVA tenderness  Extremities: WWP; no edema, clubbing or cyanosis  Vascular: 2+ radial, DP/PT pulses B/L  Skin: no rashes  Lines/Drains:     MEDICATIONS  (STANDING):  ALBUTerol    90 MICROgram(s) HFA Inhaler 2 Puff(s) Inhalation every 6 hours  ARIPiprazole 20 milliGRAM(s) Oral daily  dexAMETHasone     Tablet 6 milliGRAM(s) Oral daily  dextrose 40% Gel 15 Gram(s) Oral once  dextrose 5%. 1000 milliLiter(s) (50 mL/Hr) IV Continuous <Continuous>  dextrose 5%. 1000 milliLiter(s) (100 mL/Hr) IV Continuous <Continuous>  dextrose 50% Injectable 25 Gram(s) IV Push once  dextrose 50% Injectable 12.5 Gram(s) IV Push once  dextrose 50% Injectable 25 Gram(s) IV Push once  enoxaparin Injectable 40 milliGRAM(s) SubCutaneous every 12 hours  glucagon  Injectable 1 milliGRAM(s) IntraMuscular once  insulin lispro (ADMELOG) corrective regimen sliding scale   SubCutaneous Before meals and at bedtime  magnesium sulfate  IVPB 2 Gram(s) IV Intermittent every 2 hours  pantoprazole    Tablet 40 milliGRAM(s) Oral before breakfast  remdesivir  IVPB   IV Intermittent   remdesivir  IVPB 100 milliGRAM(s) IV Intermittent every 24 hours  simvastatin 10 milliGRAM(s) Oral at bedtime  tiotropium 18 MICROgram(s) Capsule 1 Capsule(s) Inhalation daily    MEDICATIONS  (PRN):  simethicone 80 milliGRAM(s) Chew daily PRN Gas      Allergies    No Known Allergies    Intolerances        LABS:                        9.2    11.56 )-----------( 277      ( 2021 06:42 )             28.3         140  |  100  |  18  ----------------------------<  259<H>  4.0   |  29  |  0.62    Ca    9.2      2021 06:42  Phos  2.5       Mg     1.3         TPro  6.2  /  Alb  2.8<L>  /  TBili  0.3  /  DBili  x   /  AST  16  /  ALT  13  /  AlkPhos  72  -    PT/INR - ( 2021 12:08 )   PT: 20.4 sec;   INR: 1.75          PTT - ( 2021 12:08 )  PTT:32.4 sec  Urinalysis Basic - ( 2021 07:05 )    Color: Yellow / Appearance: Clear / S.015 / pH: x  Gluc: x / Ketone: NEGATIVE  / Bili: Negative / Urobili: 0.2 E.U./dL   Blood: x / Protein: 100 mg/dL / Nitrite: NEGATIVE   Leuk Esterase: Trace / RBC: x / WBC 5-10 /HPF   Sq Epi: x / Non Sq Epi: Moderate /HPF / Bacteria: Present /HPF        RADIOLOGY & ADDITIONAL TESTS:   ***TRANSFER FROM [facility] TO [facility]****  INTERVAL HPI/OVERNIGHT EVENTS:  71 yo F with past medical history of Asthma (Dx after  attack) HTN, DM, HLD, L hip arthritis, Bipolar Disorder recently admitted for UTI with E. Coli bacteremia (complicated by sepsis, with acute kidney injury, rhabdomyolysis, and transaminitis), dc'd to subacute rehab facility, now presenting from [facility] rehab due to SOB, cough, and hypoxia x1 wk c/b subjective worsening of asthma requiring duonebs treatment (s/p decadron 8mg x5 days and levaquin course.) Hypoxia and dyspnea acutely worsened day of admit, 85% on presentation to ED. Pt was placed on NRB in ED with improvement to 93%. Denies any chest tightness or need for nebulizers. Denies any nausea, vomiting, diarrhea, abdominal pain, back pain, or flank pain. ICU consulted in ED, pt in no respiratory distress and able to saturate 92-94% on NC, so approved for admission to [facility] with tele/icu not required at that time. CXR showed b/l infiltrates, labs notable for WBC 12.46, D-dimer 488, BNP 2034, lactate 1.3 and procal 0.06    OSIRIS o/n and this AM pt able to speak in full sentences with no subjective SOB. Physical exam this AM notable for diffuse expiratory wheezes and bibasilar inspiratory crackles posteriorly and laterally. Pt also has baseline b/l UE and LE tremor, more pronounced on the R, which she attributes to her abilify usage. Pt had epsiode of untriggered desat to 85% on 6L NC, no subjective SOB, no tachycardia, no CP and pt in NAD. Pt place on her r side and with incentive spirometry O2 increased to 91%, however immediately decreased back to 86-88% once incentive spirometry halted. LE dopplers ordered and ICU consulted. Pt placed on NRB 15 L with O2 sat improved to 94% and placed on monitor, noted to have heart rate vacillating between 65 and 293, likely 2/2 baseline tremors but palpable radial pulse difficult to assess, EKG ordered which showed NSR at 71 bpm. ICU consult recommends step up to 2Wo/tele for closer monitoring.    VITAL SIGNS:  T(F): 97.8 (21 @ 08:43)  HR: 69 (21 @ 08:43)  BP: 155/75 (21 @ 08:43)  RR: 20 (21 @ 08:43)  SpO2: 88% (21 @ 08:43)  Wt(kg): --    Input & Output:    21 @ 07:01  -  21 @ 07:00  --------------------------------------------------------  IN: 300 mL / OUT: 800 mL / NET: -500 mL      PHYSICAL EXAM:  Constitutional: Obese, WDWN resting comfortably in bed on 6LNC; NAD, required increase to NRB per O2 sat  HEENT: NC/AT, PERRLA, EOMI, anicteric sclera, no conjunctival pallor, no nasal discharge; uvula midline, dry MM, neck supple w/ no LAD  Respiratory: Diffuse expiratory wheezes throughout all lung fields, Bibasilar inspiratory crackles posteriorly and laterally, no increased work of breathing or accessory muscle usage  Cardiac: +S1/S2; RRR; no M/R/G  Gastrointestinal: Large abdomen, slightly distended, soft, NT; no rebound or guarding; +BSx4  Extremities: WWP, no clubbing or cyanosis; nonpitting peripheral edema b/l LE  Musculoskeletal: Observed to move all 4 extremities independently, declined MSK exam; no joint swelling, tenderness or erythema  Dermatologic: skin warm, dry and intact; no rashes, wounds, or scars  Neurologic: AAOx3; CNII-XII grossly intact; b/l UE and LE tremors R>L that pt attributes to abilify,  Psychiatric: affect and characteristics of appearance, verbalizations, behaviors are appropriate    MEDICATIONS  (STANDING):  ALBUTerol    90 MICROgram(s) HFA Inhaler 2 Puff(s) Inhalation every 6 hours  ARIPiprazole 20 milliGRAM(s) Oral daily  dexAMETHasone     Tablet 6 milliGRAM(s) Oral daily  dextrose 40% Gel 15 Gram(s) Oral once  dextrose 5%. 1000 milliLiter(s) (50 mL/Hr) IV Continuous <Continuous>  dextrose 5%. 1000 milliLiter(s) (100 mL/Hr) IV Continuous <Continuous>  dextrose 50% Injectable 25 Gram(s) IV Push once  dextrose 50% Injectable 12.5 Gram(s) IV Push once  dextrose 50% Injectable 25 Gram(s) IV Push once  enoxaparin Injectable 40 milliGRAM(s) SubCutaneous every 12 hours  glucagon  Injectable 1 milliGRAM(s) IntraMuscular once  insulin lispro (ADMELOG) corrective regimen sliding scale   SubCutaneous Before meals and at bedtime  magnesium sulfate  IVPB 2 Gram(s) IV Intermittent every 2 hours  pantoprazole    Tablet 40 milliGRAM(s) Oral before breakfast  remdesivir  IVPB   IV Intermittent   remdesivir  IVPB 100 milliGRAM(s) IV Intermittent every 24 hours  simvastatin 10 milliGRAM(s) Oral at bedtime  tiotropium 18 MICROgram(s) Capsule 1 Capsule(s) Inhalation daily    MEDICATIONS  (PRN):  simethicone 80 milliGRAM(s) Chew daily PRN Gas      Allergies    No Known Allergies    Intolerances        LABS:                        9.2    11.56 )-----------( 277      ( 2021 06:42 )             28.3     02-    140  |  100  |  18  ----------------------------<  259<H>  4.0   |  29  |  0.62    Ca    9.2      2021 06:42  Phos  2.5       Mg     1.3         TPro  6.2  /  Alb  2.8<L>  /  TBili  0.3  /  DBili  x   /  AST  16  /  ALT  13  /  AlkPhos  72  -22    PT/INR - ( 2021 12:08 )   PT: 20.4 sec;   INR: 1.75          PTT - ( 2021 12:08 )  PTT:32.4 sec  Urinalysis Basic - ( 2021 07:05 )    Color: Yellow / Appearance: Clear / S.015 / pH: x  Gluc: x / Ketone: NEGATIVE  / Bili: Negative / Urobili: 0.2 E.U./dL   Blood: x / Protein: 100 mg/dL / Nitrite: NEGATIVE   Leuk Esterase: Trace / RBC: x / WBC 5-10 /HPF   Sq Epi: x / Non Sq Epi: Moderate /HPF / Bacteria: Present /HPF        RADIOLOGY & ADDITIONAL TESTS:

## 2021-02-22 NOTE — PROGRESS NOTE ADULT - PROBLEM SELECTOR PLAN 8
- On Onglyza at home and Metformin A1c of 7.8  - mISS    #Reflux/Abdominal Gas  -Continue from rehab, prilosec 20 mg QD; continue probiotic; continue simethicone 80 mg qD PRN for gas On Onglyza at home and Metformin A1c of 7.8  - mISS    #Reflux/Abdominal Gas  -Continue from rehab, prilosec 20 mg QD; continue probiotic; continue simethicone 80 mg qD PRN for gas

## 2021-02-22 NOTE — CONSULT NOTE ADULT - ASSESSMENT
Patient Education     Headache, Unspecified    A number of things can cause headaches. The cause of your headache isn’t clear. But it doesn’t seem to be a sign of any serious illness.  You could have a tension headache or a migraine headache.  Stress can cause a tension headache. This can happen if you tense the muscles of your shoulders, neck, and scalp without knowing it. If this stress lasts long enough, you may develop a tension headache.  It is not clear why migraines occur, but certain things called\" triggers\" can raise the risk of having a migraine attack. Migraine triggers may include emotional stress or depression, or by hormone changes during the menstrual cycle. Other triggers include birth control pills and other medicines, alcohol or caffeine, foods with tyramine (such as aged cheese, wine), eyestrain, weather changes, missed meals, and lack of sleep or oversleeping.  Other causes of headache include:  · Viral illness with high fever  · Head injury with concussion  · Sinus, ear, or throat infection  · Dental pain and jaw joint (TMJ) pain  More serious but less common causes of headache include stroke, brain hemorrhage, brain tumor, meningitis, and encephalitis.  Home care  Follow these tips when taking care of yourself at home:  · Don’t drive yourself home if you were given pain medicine for your headache. Instead, have someone else drive you home. Try to sleep when you get home. You should feel much better when you wake up.  · Apply heat to the back of your neck to ease a neck muscle spasm. Take care of a migraine headache by putting an ice pack on your forehead or at the base of your skull.  · If you have nausea or vomiting, eat a light diet until your headache eases.  · If you have a migraine headache, use sunglasses when in the daylight or around bright indoor lighting until your symptoms get better. Bright glaring light can make this type of headache worse.  Follow-up care  Follow up with your  per Internal Medicine    69 yo F with past medical history of  Asthma (Dx after 9/11 attack) HTN, DM, HLD, L hip arthritis, Bipolar Disorder recently admitted for UTI with E. Coli bacteremia that presents from [facility] due to hypoxia, admitted for management of COVID infection (+ 2/21)      Problem/Plan - 1:  ·  Problem: Sepsis.  Plan: Found w/ b/l infiltrates on CXR, and 2/4 SIRS criteria, RR and WBC meeting criteria. Lactate < 2. Procal low, low suspicion for bacterial process. Sx and labs and imaging c/f COVID infection  - covid swab positive 2/21  -F/u bcx, ucx, monitor fever curve    #Pneumonia 2/2 to COVID PNA  Labs (d-dimer, ferritin, crp ) elevated with CXR demonstrating bilateral infiltrates; coming from [facility]. Strong suspicion for COVID-19.   - swab was positive  - wean supplemental oxygen as tolerated  - Isolation precautions; contact and airborne  - trend COVID-19 labs; if required  - Monitor O2 saturation, monitor for respiratory distress  - albuterol MDI; avoid duonebs if needed  - c/w Decadron 6mg for 10 day course (do 5 more days as pt has been on since 2/16)  - initiate Remdesevir taper, approved 2/21.     Problem/Plan - 2:  ·  Problem: Acute respiratory failure with hypoxia.  Plan: #Acute Hypoxic Respiratory Failure   Respiratory failure likely 2/2 to viral process with high suspicion for COVID-19  Intitally on NRB saturating 97% was able to be weaned down to 2LNC saturating 92%. Currently in NAD, no accessory muscle use, no nasal flaring, able to speak in full sentences.   - wean supplemental oxygen as tolerated  - please ensure patient does incentive spirometry during her hospital stay  - As pt's CXR concerning for some fluid as well, and given that she was "gasping for air" in last few days while being off lasix (previous 40 oral) since the january hospital adm (dc'd due to kidney function impaired at the time). Will give lasix challenge 40 mg IV x1 tonight    #Metabolic alkalosis  -Patient had been discharged in previous January adm on Na Bicarb in setting of HAGMa from renal failure. Renal function found to be recovered on this adm, with no AG. This metabolic alkalosis likely iatrogenic from the Na bicarb.   -Will not continue sodium bicarbonate anymore.     Problem/Plan - 3:  ·  Problem: Anemia.  Plan: Hemoglobin 9.2 on this adm, with hemoglobin in 9s-10s at end of previous recent admission. No active signs of bleeding, though INR 1.75 on adm.   -F/u iron studies, retic count    #Elevated INR  -INR 1.75 on admission. Has been getting apixaban 2.5 mg BID for DVT ppx while at [facility] rehab, since 2/16. Will not continue eliquis as will start on lovenox while at [facility].     Problem/Plan - 4:  ·  Problem: COVID-19.  Plan: -Symptomatic starting around a week ago. Positive test 2/21  -Initially hypoxic on RA, meeting criteria for remdesivir and dexamethasone, will give remdesivir pending CC approval and continue dexamethasone at 6 mg for 10 day course (previously given 8 mg for 5 days, starting 2/16).     Problem/Plan - 5:  ·  Problem: HTN (hypertension).  Plan: - On lisinopril 40mg at home (though more for leg swelling)  - Was Dc'd on previous january hospital adm i/s/o low BP and FACUNDO  - May continue on this adm, renal function recovered    #Lower extremity edema.    Reports starting Lasix for LE edema. No Dx of CHF.  - Was held on previous admission and discharge as mentioned above    #HLD  -Continue simvastatin 10 mg QD home med.     Problem/Plan - 6:  Problem: Bipolar disorder. Plan: -Continue home medication abilify    #B/l arm tremors  -Per patient, 2/2 to her abilify as a medication side effect, monitor.    Problem/Plan - 7:  ·  Problem: Asthma.  Plan: -Continue advair diskus, ipratorpium-albuterol   -Pt previously not on home meds for the past 4-5 years as she said she didn't need it.     Problem/Plan - 8:  ·  Problem: Diabetes mellitus.  Plan: - On Onglyza at home and Metformin A1c of 7.8  - mISS    #Reflux/Abdominal Gas  -Continue from rehab, prilosec 20 mg QD; continue probiotic; continue simethicone 80 mg qD PRN for gas.     Problem/Plan - 9:  ·  Problem: Arthritis of left hip.  Plan: Stable.     Problem/Plan - 10:  Problem: Nutrition, metabolism, and development symptoms. Plan; F: None  E: Replete PRN  N: DASH diet  GI: None  DVT: Lovenox 40 mg Q12 hrs. healthcare provider, or as advised. Talk with your provider if you have frequent headaches. He or she can help figure out a treatment plan. By knowing the earliest signs of headache, and starting treatment right away, you may be able to stop the pain yourself.  When to seek medical advice  Call your healthcare provider right away if any of these occur:  · Your head pain suddenly gets worse after sexual intercourse or strenuous activity  · Your head pain doesn’t get better within 24 hours  · You aren’t able to keep liquids down (repeated vomiting)  · Fever of 100.4ºF (38ºC) or higher, or as directed by your healthcare provider  · Stiff neck  · Extreme drowsiness, confusion, or fainting  · Dizziness or dizziness with spinning sensation (vertigo)  · Weakness in an arm or leg or one side of your face  · You have trouble talking or seeing  Date Last Reviewed: 8/1/2016  © 2149-3466 [employer]. [address] [place] [address]. All rights reserved. This information is not intended as a substitute for professional medical care. Always follow your healthcare professional's instructions.           Patient Education     Prevention Guidelines, Men Ages 50 to 64  Screening tests and vaccines are an important part of managing your health. A screening test is done to find possible disorders or diseases in people who don't have any symptoms. The goal is to find a disease early so lifestyle changes can be made and you can be watched more closely to reduce the risk of disease, or to detect it early enough to treat it most effectively. Screening tests are not considered diagnostic, but are used to determine if more testing is needed. Health counseling is essential, too. Below are guidelines for these, for men ages 50 to 64. Talk with your healthcare provider to make sure you’re up-to-date on what you need.  Screening Who needs it How often   Alcohol misuse All men in this age group At routine exams   Blood  pressure All men in this age group Yearly checkup if your blood pressure is normal  Normal blood pressure is less than 120/80 mm Hg  If your blood pressure reading is higher than normal, follow the advice of your healthcare provider      Colorectal cancer All men in this age group Flexible sigmoidoscopy every 5 years, or colonoscopy every 10 years, or double-contrast barium enema every 5 years; yearly fecal occult blood test or fecal immunochemical test; or a stool DNA test as often as your healthcare provider advises; talk with your healthcare provider about which tests are best for you   Depression All men in this age group At routine exams   Type 2 diabetes or prediabetes All men beginning at age 45 and men without symptoms at any age who are overweight or obese and have 1 or more other risk factors for diabetes At least every 3 years (yearly if your blood sugar has already begun to rise)   Type 2 diabetes All men with prediabetes Every year   Hepatitis C Men at increased risk for infection - talk with your healthcare provider At routine exams. All men ages 50 to 70 should be tested at least once for hepatitis C.   High cholesterol or triglycerides All men in this age group At least every 5 years   HIV Men at increased risk for infection - talk with your healthcare provider At routine exams   Lung cancer Adults age 55 to 80 who have smoked Yearly screening in smokers with 30 pack-year history of smoking or who quit within 15 years   Obesity All men in this age group At routine exams   Prostate cancer Starting at age 45, talk to healthcare provider about risks and benefits of digital rectal exam (JONY) and prostate-specific antigen (PSA) screening1 At routine exams   Syphilis Men at increased risk for infection - talk with your healthcare provider At routine exams   Tuberculosis Men at increased risk for infection - talk with your healthcare provider Ask your healthcare provider   Vision All men in this age group  Ask your healthcare provider   Vaccine Who needs it How often   Chickenpox (varicella) All men in this age group who have no record of this infection or vaccine 2 doses; second dose should be given at least 4 weeks after the first dose   Hepatitis A Men at increased risk for infection - talk with your healthcare provider 2 doses given at least 6 months apart   Hepatitis B Men at increased risk for infection - talk with your healthcare provider 3 doses over 6 months; second dose should be given 1 month after the first dose; the third dose should be given at least 2 months after the second dose and at least 4 months after the first dose   Haemophilus influenzae Type B (HIB) Men at increased risk for infection - talk with your healthcare provider 1 to 3 doses   Influenza (flu) All men in this age group Once a year   Measles, mumps, rubella (MMR) Men in this age group through their late 50s who have no record of these infections or vaccines 1 or 2 doses; ask your healthcare provider   Meningococcal Men at increased risk for infection - talk with your healthcare provider 1 or more doses   Pneumococcal conjugate vaccine (PCV13) and pneumococcal polysaccharide vaccine (PPSV23) Men at increased risk for infection - talk with your healthcare provider PCV13: 1 dose ages 19 to 65 (protects against 13 types of pneumococcal bacteria)     PPSV23: 1 to 2 doses through age 64, or 1 dose at 65 or older (protects against 23 types of pneumococcal bacteria)      Tetanus/diphtheria/  pertussis (Td/Tdap) booster All men in this age group Td every 10 years, or a one-time dose of Tdap instead of a Td booster after age 18, then Td every 10 years   Zoster All men ages 60 and older 1 dose   Counseling Who needs it How often   Diet and exercise Men who are overweight or obese When diagnosed, and then at routine exams   Sexually transmitted infection prevention Men at increased risk for infection - talk with your healthcare provider At routine  exams   Use of daily aspirin Men in this age group at risk for cardiovascular health problems At routine exams   Use of tobacco and the health effects it can cause All men in this age group Every visit   [address] [place] [address] [facility]  Date Last Reviewed: 2/1/2017  © 4690-9739 [employer]. [address] [place] [address]. All rights reserved. This information is not intended as a substitute for professional medical care. Always follow your healthcare professional's instructions.

## 2021-02-22 NOTE — PROGRESS NOTE ADULT - PROBLEM SELECTOR PLAN 8
- On Onglyza at home and Metformin A1c of 7.8  - mISS    #Reflux/Abdominal Gas  -Continue from rehab, prilosec 20 mg QD; continue probiotic; continue simethicone 80 mg qD PRN for gas

## 2021-02-22 NOTE — PROGRESS NOTE ADULT - PROBLEM SELECTOR PLAN 3
Hemoglobin 9.2 on this adm, with hemoglobin in 9s-10s at end of previous recent admission. No active signs of bleeding, though INR 1.75 on adm.   -F/u iron studies, retic count    #Elevated INR  -INR 1.75 on admission. Has been getting apixaban 2.5 mg BID for DVT ppx while at [facility] rehab, since 2/16. Will not continue eliquis as will start on lovenox while at [facility]

## 2021-02-23 LAB
ALBUMIN SERPL ELPH-MCNC: 2.5 G/DL — LOW (ref 3.3–5)
ALP SERPL-CCNC: 64 U/L — SIGNIFICANT CHANGE UP (ref 40–120)
ALT FLD-CCNC: 10 U/L — SIGNIFICANT CHANGE UP (ref 10–45)
ANION GAP SERPL CALC-SCNC: 10 MMOL/L — SIGNIFICANT CHANGE UP (ref 5–17)
AST SERPL-CCNC: 13 U/L — SIGNIFICANT CHANGE UP (ref 10–40)
BASOPHILS # BLD AUTO: 0.02 K/UL — SIGNIFICANT CHANGE UP (ref 0–0.2)
BASOPHILS NFR BLD AUTO: 0.2 % — SIGNIFICANT CHANGE UP (ref 0–2)
BILIRUB SERPL-MCNC: 0.3 MG/DL — SIGNIFICANT CHANGE UP (ref 0.2–1.2)
BLD GP AB SCN SERPL QL: NEGATIVE — SIGNIFICANT CHANGE UP
BUN SERPL-MCNC: 23 MG/DL — SIGNIFICANT CHANGE UP (ref 7–23)
CALCIUM SERPL-MCNC: 9.2 MG/DL — SIGNIFICANT CHANGE UP (ref 8.4–10.5)
CHLORIDE SERPL-SCNC: 102 MMOL/L — SIGNIFICANT CHANGE UP (ref 96–108)
CO2 SERPL-SCNC: 29 MMOL/L — SIGNIFICANT CHANGE UP (ref 22–31)
CREAT SERPL-MCNC: 0.71 MG/DL — SIGNIFICANT CHANGE UP (ref 0.5–1.3)
CRP SERPL-MCNC: 2.74 MG/DL — HIGH (ref 0–0.4)
D DIMER BLD IA.RAPID-MCNC: 309 NG/ML DDU — HIGH
EOSINOPHIL # BLD AUTO: 0 K/UL — SIGNIFICANT CHANGE UP (ref 0–0.5)
EOSINOPHIL NFR BLD AUTO: 0 % — SIGNIFICANT CHANGE UP (ref 0–6)
FERRITIN SERPL-MCNC: 439 NG/ML — HIGH (ref 15–150)
GLUCOSE BLDC GLUCOMTR-MCNC: 118 MG/DL — HIGH (ref 70–99)
GLUCOSE BLDC GLUCOMTR-MCNC: 222 MG/DL — HIGH (ref 70–99)
GLUCOSE BLDC GLUCOMTR-MCNC: 249 MG/DL — HIGH (ref 70–99)
GLUCOSE BLDC GLUCOMTR-MCNC: 284 MG/DL — HIGH (ref 70–99)
GLUCOSE BLDC GLUCOMTR-MCNC: 300 MG/DL — HIGH (ref 70–99)
GLUCOSE SERPL-MCNC: 106 MG/DL — HIGH (ref 70–99)
HCT VFR BLD CALC: 28.5 % — LOW (ref 34.5–45)
HGB BLD-MCNC: 9 G/DL — LOW (ref 11.5–15.5)
IMM GRANULOCYTES NFR BLD AUTO: 5.4 % — HIGH (ref 0–1.5)
LYMPHOCYTES # BLD AUTO: 1.24 K/UL — SIGNIFICANT CHANGE UP (ref 1–3.3)
LYMPHOCYTES # BLD AUTO: 11.4 % — LOW (ref 13–44)
MAGNESIUM SERPL-MCNC: 1.8 MG/DL — SIGNIFICANT CHANGE UP (ref 1.6–2.6)
MCHC RBC-ENTMCNC: 28.3 PG — SIGNIFICANT CHANGE UP (ref 27–34)
MCHC RBC-ENTMCNC: 31.6 GM/DL — LOW (ref 32–36)
MCV RBC AUTO: 89.6 FL — SIGNIFICANT CHANGE UP (ref 80–100)
MONOCYTES # BLD AUTO: 0.96 K/UL — HIGH (ref 0–0.9)
MONOCYTES NFR BLD AUTO: 8.9 % — SIGNIFICANT CHANGE UP (ref 2–14)
NEUTROPHILS # BLD AUTO: 8.02 K/UL — HIGH (ref 1.8–7.4)
NEUTROPHILS NFR BLD AUTO: 74.1 % — SIGNIFICANT CHANGE UP (ref 43–77)
NRBC # BLD: 0 /100 WBCS — SIGNIFICANT CHANGE UP (ref 0–0)
PHOSPHATE SERPL-MCNC: 2.3 MG/DL — LOW (ref 2.5–4.5)
PLATELET # BLD AUTO: 330 K/UL — SIGNIFICANT CHANGE UP (ref 150–400)
POTASSIUM SERPL-MCNC: 3.4 MMOL/L — LOW (ref 3.5–5.3)
POTASSIUM SERPL-SCNC: 3.4 MMOL/L — LOW (ref 3.5–5.3)
PROT SERPL-MCNC: 5.8 G/DL — LOW (ref 6–8.3)
RBC # BLD: 3.18 M/UL — LOW (ref 3.8–5.2)
RBC # FLD: 13.2 % — SIGNIFICANT CHANGE UP (ref 10.3–14.5)
RH IG SCN BLD-IMP: POSITIVE — SIGNIFICANT CHANGE UP
SODIUM SERPL-SCNC: 141 MMOL/L — SIGNIFICANT CHANGE UP (ref 135–145)
WBC # BLD: 10.83 K/UL — HIGH (ref 3.8–10.5)
WBC # FLD AUTO: 10.83 K/UL — HIGH (ref 3.8–10.5)

## 2021-02-23 PROCEDURE — 93970 EXTREMITY STUDY: CPT | Mod: 26

## 2021-02-23 PROCEDURE — 99233 SBSQ HOSP IP/OBS HIGH 50: CPT | Mod: GC

## 2021-02-23 RX ORDER — SODIUM,POTASSIUM PHOSPHATES 278-250MG
1 POWDER IN PACKET (EA) ORAL ONCE
Refills: 0 | Status: COMPLETED | OUTPATIENT
Start: 2021-02-23 | End: 2021-02-23

## 2021-02-23 RX ORDER — MAGNESIUM SULFATE 500 MG/ML
2 VIAL (ML) INJECTION ONCE
Refills: 0 | Status: COMPLETED | OUTPATIENT
Start: 2021-02-23 | End: 2021-02-23

## 2021-02-23 RX ORDER — POTASSIUM CHLORIDE 20 MEQ
20 PACKET (EA) ORAL ONCE
Refills: 0 | Status: COMPLETED | OUTPATIENT
Start: 2021-02-23 | End: 2021-02-23

## 2021-02-23 RX ORDER — SAXAGLIPTIN 5 MG/1
1 TABLET, FILM COATED ORAL
Qty: 0 | Refills: 0 | DISCHARGE

## 2021-02-23 RX ADMIN — Medication 0: at 06:27

## 2021-02-23 RX ADMIN — ALBUTEROL 2 PUFF(S): 90 AEROSOL, METERED ORAL at 06:37

## 2021-02-23 RX ADMIN — Medication 6: at 21:51

## 2021-02-23 RX ADMIN — Medication 1 PACKET(S): at 11:39

## 2021-02-23 RX ADMIN — Medication 3 MILLILITER(S): at 17:38

## 2021-02-23 RX ADMIN — PANTOPRAZOLE SODIUM 40 MILLIGRAM(S): 20 TABLET, DELAYED RELEASE ORAL at 06:27

## 2021-02-23 RX ADMIN — Medication 6 MILLIGRAM(S): at 06:28

## 2021-02-23 RX ADMIN — TIOTROPIUM BROMIDE 1 CAPSULE(S): 18 CAPSULE ORAL; RESPIRATORY (INHALATION) at 11:43

## 2021-02-23 RX ADMIN — SIMVASTATIN 10 MILLIGRAM(S): 20 TABLET, FILM COATED ORAL at 21:51

## 2021-02-23 RX ADMIN — Medication 4: at 16:57

## 2021-02-23 RX ADMIN — Medication 50 GRAM(S): at 11:17

## 2021-02-23 RX ADMIN — Medication 6: at 11:35

## 2021-02-23 RX ADMIN — Medication 20 MILLIEQUIVALENT(S): at 11:18

## 2021-02-23 RX ADMIN — ENOXAPARIN SODIUM 40 MILLIGRAM(S): 100 INJECTION SUBCUTANEOUS at 06:28

## 2021-02-23 RX ADMIN — ENOXAPARIN SODIUM 40 MILLIGRAM(S): 100 INJECTION SUBCUTANEOUS at 17:38

## 2021-02-23 RX ADMIN — Medication 3 MILLILITER(S): at 11:39

## 2021-02-23 RX ADMIN — REMDESIVIR 500 MILLIGRAM(S): 5 INJECTION INTRAVENOUS at 17:38

## 2021-02-23 RX ADMIN — ARIPIPRAZOLE 20 MILLIGRAM(S): 15 TABLET ORAL at 11:43

## 2021-02-23 RX ADMIN — Medication 3 MILLILITER(S): at 21:52

## 2021-02-23 RX ADMIN — Medication 4: at 00:34

## 2021-02-23 NOTE — PROGRESS NOTE ADULT - PROBLEM SELECTOR PLAN 7
Pt previously not on home meds for the past 4-5 years as she said she didn't need it  - c/w albuterol 2 puffs q6 hrs  - c/w spiriva qd

## 2021-02-23 NOTE — PROGRESS NOTE ADULT - PROBLEM SELECTOR PLAN 2
#Acute Hypoxic Respiratory Failure   Due to COVID as per above. Lasix 40mg PO d/c'd during January hospital admission due to FACUNDO.   - consider restarting lasix 40mg PO    #Metabolic alkalosis  Patient had been discharged in previous January adm on Na Bicarb in setting of HAGMa from renal failure. Renal function found to be recovered on this adm, with no AG. This metabolic alkalosis likely iatrogenic from the Na bicarb.   - hold na bicarb

## 2021-02-23 NOTE — PROGRESS NOTE ADULT - PROBLEM SELECTOR PLAN 3
Baseline approx 9.. No active signs of bleeding. Ferritin elevated, haptoglobin, transferritin decreased, retic hypoproliferative      #Elevated INR  INR 1.75 as on apixaban 2.5 mg BID since 2/16 for DVT ppx while at [facility] rehab.  - c/w lovenox dvt ppx

## 2021-02-23 NOTE — PROGRESS NOTE ADULT - PROBLEM SELECTOR PLAN 5
Home med: lisinopril 40mg at home (though more for leg swelling), dc'ed on previous january hospital adm i/s/o low BP and FACUNDO  - hold for now    #Lower extremity edema.    Reports starting Lasix for LE edema. No Dx of CHF.  - Was held on previous admission and discharge as mentioned above    #HLD  -Continue simvastatin 10 mg QD home med

## 2021-02-23 NOTE — PROGRESS NOTE ADULT - PROBLEM SELECTOR PLAN 1
2/4 SIRS (RR and WBC) , CXR w/b/l infiltrate. Covid swab positive 2/21 Lactate < 2. Procal low, low suspicion for bacterial process.   - F/u bcx, ucx, monitor fever curve    #Pneumonia 2/2 to COVID PNA.   - c/w Decadron 6mg for 10 day course until 2/25 (do 5 more days as pt has been on since 2/16)  - c/w Remdesevir  2/21- 2/25

## 2021-02-23 NOTE — PROGRESS NOTE ADULT - ASSESSMENT
per Internal Medicine    71 yo F with past medical history of  Asthma (Dx after 9/11 attack) HTN, DM, HLD, L hip arthritis, Bipolar Disorder recently admitted for UTI with E. Coli bacteremia that presented from [facility] due to hypoxia, admitted to [facility] for management of COVID infection (+ 2/21), now stepped up to tele for HFNC.      Problem/Plan - 1:  ·  Problem: Sepsis.  Plan: 2/4 SIRS (RR and WBC) , CXR w/b/l infiltrate. Covid swab positive 2/21 Lactate < 2. Procal low, low suspicion for bacterial process.   - F/u bcx, ucx, monitor fever curve    #Pneumonia 2/2 to COVID PNA.   - c/w Decadron 6mg for 10 day course until 2/25 (do 5 more days as pt has been on since 2/16)  - c/w Remdesevir  2/21- 2/25.     Problem/Plan - 2:  ·  Problem: Acute respiratory failure with hypoxia.  Plan: #Acute Hypoxic Respiratory Failure   Due to COVID as per above. Lasix 40mg PO d/c'd during January hospital admission due to FACUNDO.   - consider restarting lasix 40mg PO    #Metabolic alkalosis  Patient had been discharged in previous January adm on Na Bicarb in setting of HAGMa from renal failure. Renal function found to be recovered on this adm, with no AG. This metabolic alkalosis likely iatrogenic from the Na bicarb.   - hold na bicarb.     Problem/Plan - 3:  ·  Problem: Anemia.  Plan: Baseline approx 9.. No active signs of bleeding. Ferritin elevated, haptoglobin, transferritin decreased, retic hypoproliferative      #Elevated INR  INR 1.75 as on apixaban 2.5 mg BID since 2/16 for DVT ppx while at [facility] rehab.  - c/w lovenox dvt ppx.     Problem/Plan - 4:  ·  Problem: COVID-19.  Plan: -Symptomatic starting around a week ago. Positive test 2/21  -Initially hypoxic on RA, meeting criteria for remdesivir and dexamethasone, will give remdesivir pending CC approval and continue dexamethasone at 6 mg for 10 day course (previously given 8 mg for 5 days, starting 2/16).     Problem/Plan - 5:  ·  Problem: HTN (hypertension).  Plan: Home med: lisinopril 40mg at home (though more for leg swelling), MN'ed on previous january hospital adm i/s/o low BP and FACUNDO  - hold for now    #Lower extremity edema.    Reports starting Lasix for LE edema. No Dx of CHF.  - Was held on previous admission and discharge as mentioned above    #HLD  -Continue simvastatin 10 mg QD home med.     Problem/Plan - 6:  Problem: Bipolar disorder. Plan: -Continue home medication abilify    #B/l arm tremors  -Per patient, 2/2 to her abilify as a medication side effect, monitor.    Problem/Plan - 7:  ·  Problem: Asthma.  Plan: Pt previously not on home meds for the past 4-5 years as she said she didn't need it  - c/w albuterol 2 puffs q6 hrs  - c/w spiriva qd.     Problem/Plan - 8:  ·  Problem: Diabetes mellitus.  Plan: On Onglyza at home and Metformin A1c of 7.8  - mISS    #Reflux/Abdominal Gas  -Continue from rehab, prilosec 20 mg QD; continue probiotic; continue simethicone 80 mg qD PRN for gas.     Problem/Plan - 9:  ·  Problem: Arthritis of left hip.  Plan: Stable.     Problem/Plan - 10:  Problem: Nutrition, metabolism, and development symptoms. Plan; F: None  E: Replete PRN  N: DASH diet  GI: None  DVT: Lovenox 40 mg Q12 hrs.

## 2021-02-23 NOTE — PROGRESS NOTE ADULT - ASSESSMENT
71 yo F with past medical history of  Asthma (Dx after 9/11 attack) HTN, DM, HLD, L hip arthritis, Bipolar Disorder recently admitted for UTI with E. Coli bacteremia that presented from [facility] due to [facility], admitted to [facility] for management of COVID infection (+ 2/21), now stepped up to tele for HFNC.

## 2021-02-23 NOTE — PROGRESS NOTE ADULT - SUBJECTIVE AND OBJECTIVE BOX
Physical Medicine and Rehabilitation Progress Note:    Patient is a 70y old  Female who presents with a chief complaint of COVID (23 Feb 2021 08:32)      HPI:  71 yo F with past medical history of  Asthma (Dx after 9/11 attack) HTN, DM, HLD, L hip arthritis, Bipolar Disorder recently admitted for UTI with E. Coli bacteremia (complicated by sepsis, with acute kidney injury, rhabdomyolysis, and transaminitis, dc'd to subacute rehab facility, now presenting from [facility] rehab due to SOB and hypoxia. Pt states she has been having SOB and mild cough starting a week ago (with some nighttime awakenings, gasping for air) and was told she had "viral PNA". She was treated decadron 8mg (started on 2/16/21) and levaquin (finished today). Pt reports minimal to no improvement in her symptoms and was noted to by hypoxic on vitals this AM. Pt was placed on NRB with improvement to 93%. Pt states that when she does not have supplemental oxygen she feels like is gasping for air. States since her symptoms started her asthma has flared as well, requiring nebulizer treatments which do help. Right now denies any chest tightness or need for nebulizers. Denies any nausea, vomiting, diarrhea, abdominal pain, back pain, or flank pain. Pt states she does have tremors as a side affect from her abilify, and when she began to felt sick her tremors worsened.    ED Course  Vitals: T 98.9, HR 86, /80, RR 22, Spo2 85% on RA  Labs: Notable for WBC 12.46, Hb 9.2, lymphocyte % 4.7, INR 1.75, , Lactate 1.3 (<2), procal 0.06, BNP 2034, VBG 7.49, Pc02 40, Po2 44, Hc03 30  Imaging: CXR mild cardiomegaly. Bilateral infiltrates. Degenerative changes thoracic spine  Received tyl 650 x1.   ICU initially consulted for hypoxia as pt was put on NRB. Was assessed, found to be in no respiratory distress, saturating 92-94%, with decision that COVID icu/tele monitoring not needed at this time.     ROS:  Otherwise negative, except as specified in HPI.  PMH:  PAST MEDICAL & SURGICAL HISTORY:  HLD (hyperlipidemia)    Bipolar disorder  Arthritis of left hip  Asthma  Diabetes mellitus  HTN (hypertension)  No significant past surgical history    ALLERGIES:  Allergies  No Known Allergies  Intolerances    MEDICATIONS:  Home Medications:  ARIPiprazole 20 mg oral tablet: 1 tab(s) orally once a day (25 Jan 2021 11:01)  insulin lispro 100 units/mL injectable solution: Sliding scale coverage three times a day with meals and before bedtime   Sugar 150-200: 2 units  201-250: 4 units   251-300: 6 units  301-350: 8 units (29 Jan 2021 16:06)  ipratropium-albuterol 0.5 mg-2.5 mg/3 mLinhalation solution: 3 milliliter(s) inhaled every 4 hours (29 Jan 2021 16:06)  pantoprazole 40 mg oral delayed release tablet: 1 tab(s) orally once a day (before a meal) (29 Jan 2021 16:06)  rosuvastatin 5 mg oral tablet: 1 tab(s) orally once a day (25 Jan 2021 11:01)  simethicone 80 mg oral tablet, chewable: 1 tab(s) orally once a day, As needed, Gas (29 Jan 2021 16:06)  sodium bicarbonate 650 mg oral tablet: 1 tab(s) orally every 12 hours (29 Jan 2021 16:06) (21 Feb 2021 16:11)                            9.0    10.83 )-----------( 330      ( 23 Feb 2021 06:54 )             28.5       02-23    141  |  102  |  23  ----------------------------<  106<H>  3.4<L>   |  29  |  0.71    Ca    9.2      23 Feb 2021 06:54  Phos  2.3     02-23  Mg     1.8     02-23    TPro  5.8<L>  /  Alb  2.5<L>  /  TBili  0.3  /  DBili  x   /  AST  13  /  ALT  10  /  AlkPhos  64  02-23    Vital Signs Last 24 Hrs  T(C): 36.8 (23 Feb 2021 13:45), Max: 36.8 (23 Feb 2021 01:23)  T(F): 98.3 (23 Feb 2021 13:45), Max: 98.3 (23 Feb 2021 01:23)  HR: 73 (23 Feb 2021 13:48) (50 - 107)  BP: 147/88 (23 Feb 2021 13:48) (131/81 - 159/77)  BP(mean): 109 (23 Feb 2021 12:12) (81 - 109)  RR: 19 (23 Feb 2021 12:12) (18 - 23)  SpO2: 91% (23 Feb 2021 13:48) (88% - 96%)    MEDICATIONS  (STANDING):  albuterol/ipratropium for Nebulization 3 milliLiter(s) Nebulizer every 6 hours  ARIPiprazole 20 milliGRAM(s) Oral daily  dexAMETHasone     Tablet 6 milliGRAM(s) Oral daily  dextrose 40% Gel 15 Gram(s) Oral once  dextrose 5%. 1000 milliLiter(s) (50 mL/Hr) IV Continuous <Continuous>  dextrose 5%. 1000 milliLiter(s) (100 mL/Hr) IV Continuous <Continuous>  dextrose 50% Injectable 25 Gram(s) IV Push once  dextrose 50% Injectable 12.5 Gram(s) IV Push once  dextrose 50% Injectable 25 Gram(s) IV Push once  enoxaparin Injectable 40 milliGRAM(s) SubCutaneous every 12 hours  glucagon  Injectable 1 milliGRAM(s) IntraMuscular once  insulin lispro (ADMELOG) corrective regimen sliding scale   SubCutaneous Before meals and at bedtime  pantoprazole    Tablet 40 milliGRAM(s) Oral before breakfast  remdesivir  IVPB 100 milliGRAM(s) IV Intermittent every 24 hours  remdesivir  IVPB   IV Intermittent   simvastatin 10 milliGRAM(s) Oral at bedtime  tiotropium 18 MICROgram(s) Capsule 1 Capsule(s) Inhalation daily    MEDICATIONS  (PRN):  ALBUTerol    90 MICROgram(s) HFA Inhaler 2 Puff(s) Inhalation every 6 hours PRN SOB  simethicone 80 milliGRAM(s) Chew daily PRN Gas    Currently Undergoing Physical/ Occupational Therapy at bedside.    Functional Status Assessment:       Previous Level of Function:     · Ambulation Skills	needed assist; needs device; ambulating with 2 person asssit and rolling walker  · Transfer Skills	needed assist  · ADL Skills	needed assist  · Work/Leisure Activity	needed assist  · Additional Comments	prior to [facility] rehab, patient living with mother, ambulating with rollator, independent, apartment, elevator    Cognitive Status Examination:   · Orientation	oriented to person, place, time and situation  · Level of Consciousness	alert  · Follows Commands and Answers Questions	100% of the time  · Personal Safety and Judgment	intact  · Short Term Memory	intact  · Long Term Memory	intact    Range of Motion Exam:   · Range of Motion Examination	no ROM deficits were identified  · Active Range of Motion Examination	no Active ROM deficits were identified    Manual Muscle Testing:   · Manual Muscle Testing Results	grossly assessed due to  functional observation against gravity > 3/5 MMT    Bed Mobility: Rolling/Turning:     · Level of [place]	minimum assist (75% patients effort)  · Physical Assist/Nonphysical Assist	1 person assist    Bed Mobility: Scooting/Bridging:     · Level of [place]	contact guard    Bed Mobility: Sit to Supine:     · Level of [place]	moderate assist (50% patients effort)  · Physical Assist/Nonphysical Assist	1 person assist    Bed Mobility: Supine to Sit:     · Level of [place]	moderate assist (50% patients effort)  · Physical Assist/Nonphysical Assist	2 person assist    Transfer: Sit to Stand:     · Level of [place]	minimum assist (75% patients effort)  · Physical Assist/Nonphysical Assist	2 person assist  · Assistive Device	rolling walker    Transfer: Stand to Sit:     · Level of [place]	minimum assist (75% patients effort)  · Physical Assist/Nonphysical Assist	2 person assist  · Assistive Device	rolling walker    Gait Skills:     · Level of [place]	minimum assist (75% patients effort)  · Physical Assist/Nonphysical Assist	2 person assist  · Assistive Device	rolling walker  · Gait Distance	5 side steps; FIM1    Gait Analysis:     · Gait Deviations Noted	decreased latoya; decreased step length; decreased weight-shifting ability  · Impairments Contributing to Gait Deviations	impaired balance; decreased strength    Balance Skills Assessment:     · Sitting Balance: Static	good balance  · Sitting Balance: Dynamic	good balance  · Sit-to-Stand Balance	poor plus  · Standing Balance: Static	poor plus  · Standing Balance: Dynamic	poor balance  · Systems Impairment Contributing to Balance Disturbance	musculoskeletal  · Identified Impairments Contributing to Balance Disturbance	decreased strength    Clinical Impressions:   · Criteria for Skilled Therapeutic Interventions	impairments found; functional limitations in following categories; anticipated discharge recommendation  · Impairments Found (describe specific impairments)	aerobic capacity/endurance; gait, locomotion, and balance; ventilation and respiration/gas exchange  · Functional Limitations in Following Categories (describe specific limitations)	self-care; home management; community/leisure  · Risk Reduction/Prevention (Describe Specific Areas of risk reduction/prevention)	risk factors  · Risk Areas	fall  · Rehab Potential	good, to achieve stated therapy goals  · Therapy Frequency	2-3x/week        PM&R Impression: as above    Current Disposition Plan:    return to [facility]/subacute rehab

## 2021-02-23 NOTE — PROGRESS NOTE ADULT - SUBJECTIVE AND OBJECTIVE BOX
O/N: No acute events    Subjective: Patient seen and examined at bedside. Denies fever/chills, chest pain, abdominal pain, diarrhea. SOB stable. Reports taking lasix PRN for a few days this month, denies weight gain, reports legs currently non-swollen.    VITAL SIGNS:  T(F): 98.1 (21 @ 05:43)  HR: 50 (21 @ 07:59)  BP: 150/57 (21 @ 06:20)  RR: 18 (21 @ 07:59)  SpO2: 89% (21 @ 07:59)  Wt(kg): --    PHYSICAL EXAM:  Neurologic: AAOx3; CNII-XII grossly intact; R>L upper extremity resting and intention tremor   Psychiatric: affect and characteristics of appearance, verbalizations, behaviors are appropriate  Constitutional: WDWN resting comfortably in bed; NAD  Head: NC/AT  Eyes: PERRL, EOMI, clear conjunctiva  ENT: no nasal discharge; uvula midline, no oropharyngeal erythema or exudates; MMM  Neck: supple; no JVD or thyromegaly  Respiratory: CTA B/L, no retractions  Cardiac: +S1/S2; RRR; no M/R/G; PMI non-displaced  Gastrointestinal: soft, NT/ND; no rebound or guarding; +BSx4  Genitourinary: normal external genitalia  Back: spine midline, no bony tenderness or step-offs; no CVAT B/L  Extremities: no clubbing or cyanosis; no peripheral edema  Musculoskeletal: no joint swelling, tenderness or erythema  Vascular: 2+ radial, femoral, DP/PT pulses B/L  Dermatologic: skin warm, dry and intact; no rashes, wounds, or scars  Lymphatic: no submandibular or cervical LAD      MEDICATIONS  (STANDING):  albuterol/ipratropium for Nebulization 3 milliLiter(s) Nebulizer every 6 hours  ARIPiprazole 20 milliGRAM(s) Oral daily  dexAMETHasone     Tablet 6 milliGRAM(s) Oral daily  dextrose 40% Gel 15 Gram(s) Oral once  dextrose 5%. 1000 milliLiter(s) (50 mL/Hr) IV Continuous <Continuous>  dextrose 5%. 1000 milliLiter(s) (100 mL/Hr) IV Continuous <Continuous>  dextrose 50% Injectable 25 Gram(s) IV Push once  dextrose 50% Injectable 12.5 Gram(s) IV Push once  dextrose 50% Injectable 25 Gram(s) IV Push once  enoxaparin Injectable 40 milliGRAM(s) SubCutaneous every 12 hours  glucagon  Injectable 1 milliGRAM(s) IntraMuscular once  insulin lispro (ADMELOG) corrective regimen sliding scale   SubCutaneous Before meals and at bedtime  pantoprazole    Tablet 40 milliGRAM(s) Oral before breakfast  remdesivir  IVPB   IV Intermittent   remdesivir  IVPB 100 milliGRAM(s) IV Intermittent every 24 hours  simvastatin 10 milliGRAM(s) Oral at bedtime  tiotropium 18 MICROgram(s) Capsule 1 Capsule(s) Inhalation daily    MEDICATIONS  (PRN):  ALBUTerol    90 MICROgram(s) HFA Inhaler 2 Puff(s) Inhalation every 6 hours PRN SOB  simethicone 80 milliGRAM(s) Chew daily PRN Gas      Allergies    No Known Allergies    Intolerances        LABS:                        9.0    10.83 )-----------( 330      ( 2021 06:54 )             28.5         141  |  102  |  23  ----------------------------<  106<H>  3.4<L>   |  29  |  0.71    Ca    9.2      2021 06:54  Phos  2.3       Mg     1.8         TPro  5.8<L>  /  Alb  x   /  TBili  0.3  /  DBili  x   /  AST  13  /  ALT  10  /  AlkPhos  64  23    PT/INR - ( 2021 12:08 )   PT: 20.4 sec;   INR: 1.75          PTT - ( 2021 12:08 )  PTT:32.4 sec  Urinalysis Basic - ( 2021 07:05 )    Color: Yellow / Appearance: Clear / S.015 / pH: x  Gluc: x / Ketone: NEGATIVE  / Bili: Negative / Urobili: 0.2 E.U./dL   Blood: x / Protein: 100 mg/dL / Nitrite: NEGATIVE   Leuk Esterase: Trace / RBC: x / WBC 5-10 /HPF   Sq Epi: x / Non Sq Epi: Moderate /HPF / Bacteria: Present /HPF        RADIOLOGY & ADDITIONAL TESTS: Reviewed

## 2021-02-23 NOTE — PROGRESS NOTE ADULT - PROBLEM SELECTOR PLAN 8
On Onglyza at home and Metformin A1c of 7.8  - mISS    #Reflux/Abdominal Gas  -Continue from rehab, prilosec 20 mg QD; continue probiotic; continue simethicone 80 mg qD PRN for gas

## 2021-02-24 LAB
-  AMPICILLIN: SIGNIFICANT CHANGE UP
-  CIPROFLOXACIN: SIGNIFICANT CHANGE UP
-  LEVOFLOXACIN: SIGNIFICANT CHANGE UP
-  NITROFURANTOIN: SIGNIFICANT CHANGE UP
-  TETRACYCLINE: SIGNIFICANT CHANGE UP
-  VANCOMYCIN: SIGNIFICANT CHANGE UP
ALBUMIN SERPL ELPH-MCNC: 2.6 G/DL — LOW (ref 3.3–5)
ALP SERPL-CCNC: 62 U/L — SIGNIFICANT CHANGE UP (ref 40–120)
ALT FLD-CCNC: 10 U/L — SIGNIFICANT CHANGE UP (ref 10–45)
ANION GAP SERPL CALC-SCNC: 7 MMOL/L — SIGNIFICANT CHANGE UP (ref 5–17)
AST SERPL-CCNC: 14 U/L — SIGNIFICANT CHANGE UP (ref 10–40)
BASOPHILS # BLD AUTO: 0.03 K/UL — SIGNIFICANT CHANGE UP (ref 0–0.2)
BASOPHILS NFR BLD AUTO: 0.3 % — SIGNIFICANT CHANGE UP (ref 0–2)
BILIRUB SERPL-MCNC: 0.4 MG/DL — SIGNIFICANT CHANGE UP (ref 0.2–1.2)
BUN SERPL-MCNC: 23 MG/DL — SIGNIFICANT CHANGE UP (ref 7–23)
CALCIUM SERPL-MCNC: 8.8 MG/DL — SIGNIFICANT CHANGE UP (ref 8.4–10.5)
CHLORIDE SERPL-SCNC: 104 MMOL/L — SIGNIFICANT CHANGE UP (ref 96–108)
CO2 SERPL-SCNC: 29 MMOL/L — SIGNIFICANT CHANGE UP (ref 22–31)
CREAT SERPL-MCNC: 0.63 MG/DL — SIGNIFICANT CHANGE UP (ref 0.5–1.3)
CULTURE RESULTS: SIGNIFICANT CHANGE UP
EOSINOPHIL # BLD AUTO: 0.01 K/UL — SIGNIFICANT CHANGE UP (ref 0–0.5)
EOSINOPHIL NFR BLD AUTO: 0.1 % — SIGNIFICANT CHANGE UP (ref 0–6)
GLUCOSE BLDC GLUCOMTR-MCNC: 110 MG/DL — HIGH (ref 70–99)
GLUCOSE BLDC GLUCOMTR-MCNC: 209 MG/DL — HIGH (ref 70–99)
GLUCOSE BLDC GLUCOMTR-MCNC: 250 MG/DL — HIGH (ref 70–99)
GLUCOSE BLDC GLUCOMTR-MCNC: 289 MG/DL — HIGH (ref 70–99)
GLUCOSE SERPL-MCNC: 101 MG/DL — HIGH (ref 70–99)
HCT VFR BLD CALC: 26.7 % — LOW (ref 34.5–45)
HGB BLD-MCNC: 8.4 G/DL — LOW (ref 11.5–15.5)
IMM GRANULOCYTES NFR BLD AUTO: 6 % — HIGH (ref 0–1.5)
LYMPHOCYTES # BLD AUTO: 1.82 K/UL — SIGNIFICANT CHANGE UP (ref 1–3.3)
LYMPHOCYTES # BLD AUTO: 16.7 % — SIGNIFICANT CHANGE UP (ref 13–44)
MAGNESIUM SERPL-MCNC: 1.8 MG/DL — SIGNIFICANT CHANGE UP (ref 1.6–2.6)
MCHC RBC-ENTMCNC: 27.5 PG — SIGNIFICANT CHANGE UP (ref 27–34)
MCHC RBC-ENTMCNC: 31.5 GM/DL — LOW (ref 32–36)
MCV RBC AUTO: 87.5 FL — SIGNIFICANT CHANGE UP (ref 80–100)
METHOD TYPE: SIGNIFICANT CHANGE UP
MONOCYTES # BLD AUTO: 0.92 K/UL — HIGH (ref 0–0.9)
MONOCYTES NFR BLD AUTO: 8.5 % — SIGNIFICANT CHANGE UP (ref 2–14)
NEUTROPHILS # BLD AUTO: 7.45 K/UL — HIGH (ref 1.8–7.4)
NEUTROPHILS NFR BLD AUTO: 68.4 % — SIGNIFICANT CHANGE UP (ref 43–77)
NRBC # BLD: 0 /100 WBCS — SIGNIFICANT CHANGE UP (ref 0–0)
ORGANISM # SPEC MICROSCOPIC CNT: SIGNIFICANT CHANGE UP
ORGANISM # SPEC MICROSCOPIC CNT: SIGNIFICANT CHANGE UP
PHOSPHATE SERPL-MCNC: 2.4 MG/DL — LOW (ref 2.5–4.5)
PLATELET # BLD AUTO: 325 K/UL — SIGNIFICANT CHANGE UP (ref 150–400)
POTASSIUM SERPL-MCNC: 3.7 MMOL/L — SIGNIFICANT CHANGE UP (ref 3.5–5.3)
POTASSIUM SERPL-SCNC: 3.7 MMOL/L — SIGNIFICANT CHANGE UP (ref 3.5–5.3)
PROT SERPL-MCNC: 5.4 G/DL — LOW (ref 6–8.3)
RBC # BLD: 3.05 M/UL — LOW (ref 3.8–5.2)
RBC # FLD: 13.4 % — SIGNIFICANT CHANGE UP (ref 10.3–14.5)
SODIUM SERPL-SCNC: 140 MMOL/L — SIGNIFICANT CHANGE UP (ref 135–145)
SPECIMEN SOURCE: SIGNIFICANT CHANGE UP
WBC # BLD: 10.88 K/UL — HIGH (ref 3.8–10.5)
WBC # FLD AUTO: 10.88 K/UL — HIGH (ref 3.8–10.5)

## 2021-02-24 PROCEDURE — 99233 SBSQ HOSP IP/OBS HIGH 50: CPT | Mod: GC

## 2021-02-24 RX ORDER — LANOLIN ALCOHOL/MO/W.PET/CERES
5 CREAM (GRAM) TOPICAL AT BEDTIME
Refills: 0 | Status: DISCONTINUED | OUTPATIENT
Start: 2021-02-24 | End: 2021-03-04

## 2021-02-24 RX ORDER — SODIUM,POTASSIUM PHOSPHATES 278-250MG
1 POWDER IN PACKET (EA) ORAL ONCE
Refills: 0 | Status: COMPLETED | OUTPATIENT
Start: 2021-02-24 | End: 2021-02-24

## 2021-02-24 RX ORDER — INSULIN LISPRO 100/ML
4 VIAL (ML) SUBCUTANEOUS
Refills: 0 | Status: DISCONTINUED | OUTPATIENT
Start: 2021-02-25 | End: 2021-03-04

## 2021-02-24 RX ORDER — MAGNESIUM SULFATE 500 MG/ML
2 VIAL (ML) INJECTION ONCE
Refills: 0 | Status: COMPLETED | OUTPATIENT
Start: 2021-02-24 | End: 2021-02-24

## 2021-02-24 RX ADMIN — ARIPIPRAZOLE 20 MILLIGRAM(S): 15 TABLET ORAL at 12:04

## 2021-02-24 RX ADMIN — Medication 6 MILLIGRAM(S): at 06:40

## 2021-02-24 RX ADMIN — Medication 3 MILLILITER(S): at 12:04

## 2021-02-24 RX ADMIN — SIMVASTATIN 10 MILLIGRAM(S): 20 TABLET, FILM COATED ORAL at 20:39

## 2021-02-24 RX ADMIN — REMDESIVIR 500 MILLIGRAM(S): 5 INJECTION INTRAVENOUS at 20:02

## 2021-02-24 RX ADMIN — ENOXAPARIN SODIUM 40 MILLIGRAM(S): 100 INJECTION SUBCUTANEOUS at 06:40

## 2021-02-24 RX ADMIN — Medication 3 MILLILITER(S): at 06:40

## 2021-02-24 RX ADMIN — ENOXAPARIN SODIUM 40 MILLIGRAM(S): 100 INJECTION SUBCUTANEOUS at 20:02

## 2021-02-24 RX ADMIN — Medication 50 GRAM(S): at 14:37

## 2021-02-24 RX ADMIN — TIOTROPIUM BROMIDE 1 CAPSULE(S): 18 CAPSULE ORAL; RESPIRATORY (INHALATION) at 12:04

## 2021-02-24 RX ADMIN — Medication 3 MILLILITER(S): at 22:06

## 2021-02-24 RX ADMIN — PANTOPRAZOLE SODIUM 40 MILLIGRAM(S): 20 TABLET, DELAYED RELEASE ORAL at 06:40

## 2021-02-24 RX ADMIN — Medication 3 MILLILITER(S): at 18:20

## 2021-02-24 RX ADMIN — Medication 4: at 22:05

## 2021-02-24 RX ADMIN — Medication 1 TABLET(S): at 14:37

## 2021-02-24 RX ADMIN — Medication 6: at 11:30

## 2021-02-24 RX ADMIN — Medication 4: at 16:40

## 2021-02-24 RX ADMIN — Medication 5 MILLIGRAM(S): at 00:43

## 2021-02-24 NOTE — DIETITIAN INITIAL EVALUATION ADULT. - OTHER INFO
69 yo F with past medical history of  Asthma (Dx after 9/11 attack) HTN, DM, HLD, L hip arthritis, Bipolar Disorder recently admitted for UTI with E. Coli bacteremia that presented from [facility] due to hypoxia, admitted to [facility] for management of COVID infection (+ 2/21), now stepped up to tele for HFNC.    On assessment, pt seen in room eating lunch, pleasant. She endorses good louann/ intake of hospital food stating she likes to "have a few bites of all the sides and the main dish." She enjoys the pudding and eats 100%. NKFA. Denies following any dietary restrictions at home; does not check her blood sugar stating she "doesn't have to" (noted A1c: 7.8). Attempted to provide DM edu however pt not receptive at this time. Pt endorses good louann/ intake PTA, stating she intentionally lost 6 lbs x unknown time frame while at Rehab. Reporting UBW of 207 lbs (which would indicate only 1.5 lb wt loss from UBW per EMR wt). Provided edu on current diet rx and rationale (DASH TLC CST CHO)- pt demonstrated some understanding however was preoccupied with eating lunch. Skin: WDL Pain: no complaints of pain during RD assessment GI: Fecal incontinence, +bm today per flowsheet. See recs below, RD to follow.

## 2021-02-24 NOTE — DIETITIAN INITIAL EVALUATION ADULT. - PROBLEM SELECTOR PLAN 1
Found w/ b/l infiltrates on CXR, and 2/4 SIRS criteria, RR and WBC meeting criteria. Lactate < 2. Procal low, low suspicion for bacterial process. Sx and labs and imaging c/f COVID infection  - covid swab positive 2/21  -F/u bcx, ucx, monitor fever curve    #Pneumonia 2/2 to COVID PNA  Labs (d-dimer, ferritin, crp ) elevated with CXR demonstrating bilateral infiltrates; coming from [facility]. Strong suspicion for COVID-19.   - swab was positive  - wean supplemental oxygen as tolerated  - Isolation precautions; contact and airborne  - trend COVID-19 labs; if required  - Monitor O2 saturation, monitor for respiratory distress  - albuterol MDI; avoid duonebs if needed  - c/w Decadron 6mg for 10 day course (do 5 more days as pt has been on since 2/16)  - initiate Remdesevir taper, approved 2/21

## 2021-02-24 NOTE — PROGRESS NOTE ADULT - PROBLEM SELECTOR PLAN 2
Baseline approx 9. No active signs of bleeding. Ferritin elevated, haptoglobin, transferritin decreased, retic hypoproliferative      #Elevated INR  INR 1.75 as on apixaban 2.5 mg BID since 2/16 for DVT ppx while at [facility] rehab.  - c/w lovenox dvt ppx -Symptomatic starting around a week ago. Positive test 2/21  -Initially hypoxic on RA, meeting criteria for remdesivir and dexamethasone, will give remdesivir pending CC approval and continue dexamethasone at 6 mg for 10 day course (previously given 8 mg for 5 days, starting 2/16)

## 2021-02-24 NOTE — DIETITIAN INITIAL EVALUATION ADULT. - PROBLEM SELECTOR PLAN 3
Hemoglobin 9.2 on this adm, with hemoglobin in 9s-10s at end of previous recent admission. No active signs of bleeding, though INR 1.75 on adm.   -F/u iron studies, retic count    #Elevated INR  -INR 1.75 on admission. Has been getting apixaban 2.5 mg BID for DVT ppx while at [facility] rehab, since 2/16. Will not continue eliquis as will start on lovenox while at [facility]

## 2021-02-24 NOTE — PROGRESS NOTE ADULT - PROBLEM SELECTOR PLAN 4
Home med: lisinopril 40mg at home (though more for leg swelling), dc'ed [facility] adm i/s/o low BP and FACUNDO  - hold for now    #Lower extremity edema.    Reports starting Lasix for LE edema. No Dx of CHF.  - Was held on previous admission and discharge as mentioned above    #HLD  -Continue simvastatin 10 mg QD home med Pt previously not on home meds for the past 4-5 years as she said she didn't need it  - c/w albuterol 2 puffs q6 hrs  - c/w spiriva qd

## 2021-02-24 NOTE — PROGRESS NOTE ADULT - PROBLEM SELECTOR PLAN 6
Pt previously not on home meds for the past 4-5 years as she said she didn't need it  - c/w albuterol 2 puffs q6 hrs  - c/w spiriva qd Stable

## 2021-02-24 NOTE — PROGRESS NOTE ADULT - SUBJECTIVE AND OBJECTIVE BOX
O/N: No acute events    Subjective: Patient seen and examined at bedside. Denies fever/chills, chest pain, abdominal pain, diarrhea, poly/dysuria. Endorses mild persistent SOB.     VITAL SIGNS:  T(F): 97.4 (02-24-21 @ 09:20)  HR: 70 (02-24-21 @ 12:15)  BP: 153/76 (02-24-21 @ 12:15)  RR: 18 (02-24-21 @ 12:15)  SpO2: 94% (02-24-21 @ 12:15)  Wt(kg): --    PHYSICAL EXAM:  Neurologic: AAOx3; CNII-XII grossly intact; R>L upper extremity resting and intention tremor   Psychiatric: affect and characteristics of appearance, verbalizations, behaviors are appropriate  Constitutional: WDWN resting comfortably in bed; NAD  Head: NC/AT  Eyes: PERRL, EOMI, clear conjunctiva  ENT: no nasal discharge; uvula midline, no oropharyngeal erythema or exudates; MMM  Neck: supple; no JVD or thyromegaly  Respiratory: CTA B/L, no retractions  Cardiac: +S1/S2; RRR; no M/R/G; PMI non-displaced  Gastrointestinal: soft, NT/ND; no rebound or guarding; +BSx4  Genitourinary: normal external genitalia  Back: spine midline, no bony tenderness or step-offs; no CVAT B/L  Extremities: no clubbing or cyanosis; no peripheral edema  Musculoskeletal: no joint swelling, tenderness or erythema  Vascular: 2+ radial, femoral, DP/PT pulses B/L  Dermatologic: skin warm, dry and intact; no rashes, wounds, or scars  Lymphatic: no submandibular or cervical LAD        MEDICATIONS  (STANDING):  albuterol/ipratropium for Nebulization 3 milliLiter(s) Nebulizer every 6 hours  ARIPiprazole 20 milliGRAM(s) Oral daily  dexAMETHasone     Tablet 6 milliGRAM(s) Oral daily  dextrose 40% Gel 15 Gram(s) Oral once  dextrose 5%. 1000 milliLiter(s) (50 mL/Hr) IV Continuous <Continuous>  dextrose 5%. 1000 milliLiter(s) (100 mL/Hr) IV Continuous <Continuous>  dextrose 50% Injectable 25 Gram(s) IV Push once  dextrose 50% Injectable 12.5 Gram(s) IV Push once  dextrose 50% Injectable 25 Gram(s) IV Push once  enoxaparin Injectable 40 milliGRAM(s) SubCutaneous every 12 hours  glucagon  Injectable 1 milliGRAM(s) IntraMuscular once  insulin lispro (ADMELOG) corrective regimen sliding scale   SubCutaneous Before meals and at bedtime  magnesium sulfate  IVPB 2 Gram(s) IV Intermittent once  melatonin 5 milliGRAM(s) Oral at bedtime  pantoprazole    Tablet 40 milliGRAM(s) Oral before breakfast  potassium phosphate / sodium phosphate Tablet (K-PHOS No. 2) 1 Tablet(s) Oral once  remdesivir  IVPB 100 milliGRAM(s) IV Intermittent every 24 hours  remdesivir  IVPB   IV Intermittent   simvastatin 10 milliGRAM(s) Oral at bedtime  tiotropium 18 MICROgram(s) Capsule 1 Capsule(s) Inhalation daily    MEDICATIONS  (PRN):  ALBUTerol    90 MICROgram(s) HFA Inhaler 2 Puff(s) Inhalation every 6 hours PRN SOB  simethicone 80 milliGRAM(s) Chew daily PRN Gas      Allergies    No Known Allergies    Intolerances        LABS:                        8.4    10.88 )-----------( 325      ( 24 Feb 2021 06:59 )             26.7     02-24    140  |  104  |  23  ----------------------------<  101<H>  3.7   |  29  |  0.63    Ca    8.8      24 Feb 2021 06:59  Phos  2.4     02-24  Mg     1.8     02-24    TPro  5.4<L>  /  Alb  2.6<L>  /  TBili  0.4  /  DBili  x   /  AST  14  /  ALT  10  /  AlkPhos  62  02-24          RADIOLOGY & ADDITIONAL TESTS: Reviewed

## 2021-02-24 NOTE — PROGRESS NOTE ADULT - PROBLEM SELECTOR PLAN 5
-Continue home medication abilify    #B/l arm tremors  -Per patient, 2/2 to her abilify as a medication side effect, monitor On Onglyza at home and Metformin A1c of 7.8  - mISS    #Reflux/Abdominal Gas  -Continue from rehab, prilosec 20 mg QD; continue probiotic; continue simethicone 80 mg qD PRN for gas

## 2021-02-24 NOTE — PROGRESS NOTE ADULT - PROBLEM SELECTOR PLAN 3
-Symptomatic starting around a week ago. Positive test 2/21  -Initially hypoxic on RA, meeting criteria for remdesivir and dexamethasone, will give remdesivir pending CC approval and continue dexamethasone at 6 mg for 10 day course (previously given 8 mg for 5 days, starting 2/16) -Continue home medication abilify    #B/l arm tremors  -Per patient, 2/2 to her abilify as a medication side effect, monitor

## 2021-02-24 NOTE — PROGRESS NOTE ADULT - PROBLEM SELECTOR PLAN 9
RESOLVED  2/4 SIRS (RR and WBC) , CXR w/b/l infiltrate. Covid swab positive 2/21 Lactate < 2. Procal low, low suspicion for bacterial process.   - Blood cx NGTD  - Urine cx growing >100k CFU e. faecalis, however, patient denies polyuria/dyusira, cause of initial sepsis likely COVID as patient improving w/o abx therapy.

## 2021-02-24 NOTE — PROGRESS NOTE ADULT - PROBLEM SELECTOR PLAN 8
Stable Baseline approx 9. No active signs of bleeding. Ferritin elevated, haptoglobin, transferritin decreased, retic hypoproliferative      #Elevated INR  INR 1.75 as on apixaban 2.5 mg BID since 2/16 for DVT ppx while at [facility] rehab.  - c/w lovenox dvt ppx

## 2021-02-24 NOTE — PROGRESS NOTE ADULT - ASSESSMENT
71 yo F with past medical history of  Asthma (Dx after 9/11 attack) HTN, DM, HLD, L hip arthritis, Bipolar Disorder recently admitted for UTI with E. Coli bacteremia that presented from [facility] due to hypoxia, admitted to [facility] for management of COVID infection (+ 2/21), now stepped up to tele for HFNC.

## 2021-02-24 NOTE — PROGRESS NOTE ADULT - PROBLEM SELECTOR PLAN 1
#Acute Hypoxic Respiratory Failure   Due to COVID as per below. Lasix 40mg PO d/c'd during January hospital admission due to FACUNDO.   - consider restarting lasix 40mg PO if evidence of fluid overload    #Pneumonia 2/2 to COVID PNA.   - c/w Decadron 6mg for 10 day course until 2/25 (do 5 more days as pt has been on since 2/16)  - c/w Remdesevir  2/21- 2/25    #Metabolic alkalosis  Patient had been discharged in previous January adm on Na Bicarb in setting of HAGMa from renal failure. Renal function found to be recovered on this adm, with no AG. This metabolic alkalosis likely iatrogenic from the Na bicarb.   - hold na bicarb #Acute Hypoxic Respiratory Failure   Due to COVID as per below. Lasix 40mg PO d/c'd during January hospital admission due to FACUNDO.   - Low suspicion for PE as 2/23 LE doppler negative for DVT, stable O2 requirements, low d-dimer,.  - consider restarting lasix 40mg PO if evidence of fluid overload    #Pneumonia 2/2 to COVID PNA.   - c/w Decadron 6mg for 10 day course until 2/25 (do 5 more days as pt has been on since 2/16)  - c/w Remdesevir  2/21- 2/25    #Metabolic alkalosis  Patient had been discharged in previous January adm on Na Bicarb in setting of HAGMa from renal failure. Renal function found to be recovered on this adm, with no AG. This metabolic alkalosis likely iatrogenic from the Na bicarb.   - hold na bicarb

## 2021-02-24 NOTE — DIETITIAN INITIAL EVALUATION ADULT. - ADD RECOMMEND
1. C/w DASH TLC CST CHO 2. Monitor %PO intake and need for addition of ONS 3. Pt w/ hx of DM + currently ordered for Decadron- bs control per team 4. Monitor weights, GI symptoms, labs 5. RD to remain available prn

## 2021-02-24 NOTE — PROGRESS NOTE ADULT - PROBLEM SELECTOR PLAN 7
On Onglyza at home and Metformin A1c of 7.8  - mISS    #Reflux/Abdominal Gas  -Continue from rehab, prilosec 20 mg QD; continue probiotic; continue simethicone 80 mg qD PRN for gas Home med: lisinopril 40mg at home (though more for leg swelling), dc'ed on previous january hospital adm i/s/o low BP and FACUNDO  - hold for now    #Lower extremity edema.    Reports starting Lasix for LE edema. No Dx of CHF.  - Was held on previous admission and discharge as mentioned above    #HLD  -Continue simvastatin 10 mg QD home med

## 2021-02-25 LAB
ALBUMIN SERPL ELPH-MCNC: 2.5 G/DL — LOW (ref 3.3–5)
ALP SERPL-CCNC: 76 U/L — SIGNIFICANT CHANGE UP (ref 40–120)
ALT FLD-CCNC: 11 U/L — SIGNIFICANT CHANGE UP (ref 10–45)
ANION GAP SERPL CALC-SCNC: 8 MMOL/L — SIGNIFICANT CHANGE UP (ref 5–17)
AST SERPL-CCNC: 15 U/L — SIGNIFICANT CHANGE UP (ref 10–40)
BASOPHILS # BLD AUTO: 0.06 K/UL — SIGNIFICANT CHANGE UP (ref 0–0.2)
BASOPHILS NFR BLD AUTO: 0.4 % — SIGNIFICANT CHANGE UP (ref 0–2)
BILIRUB SERPL-MCNC: 0.4 MG/DL — SIGNIFICANT CHANGE UP (ref 0.2–1.2)
BUN SERPL-MCNC: 21 MG/DL — SIGNIFICANT CHANGE UP (ref 7–23)
CALCIUM SERPL-MCNC: 8.8 MG/DL — SIGNIFICANT CHANGE UP (ref 8.4–10.5)
CHLORIDE SERPL-SCNC: 102 MMOL/L — SIGNIFICANT CHANGE UP (ref 96–108)
CO2 SERPL-SCNC: 27 MMOL/L — SIGNIFICANT CHANGE UP (ref 22–31)
CREAT SERPL-MCNC: 0.61 MG/DL — SIGNIFICANT CHANGE UP (ref 0.5–1.3)
EOSINOPHIL # BLD AUTO: 0.04 K/UL — SIGNIFICANT CHANGE UP (ref 0–0.5)
EOSINOPHIL NFR BLD AUTO: 0.3 % — SIGNIFICANT CHANGE UP (ref 0–6)
GLUCOSE BLDC GLUCOMTR-MCNC: 123 MG/DL — HIGH (ref 70–99)
GLUCOSE BLDC GLUCOMTR-MCNC: 268 MG/DL — HIGH (ref 70–99)
GLUCOSE BLDC GLUCOMTR-MCNC: 306 MG/DL — HIGH (ref 70–99)
GLUCOSE BLDC GLUCOMTR-MCNC: 341 MG/DL — HIGH (ref 70–99)
GLUCOSE SERPL-MCNC: 130 MG/DL — HIGH (ref 70–99)
HCT VFR BLD CALC: 29.6 % — LOW (ref 34.5–45)
HGB BLD-MCNC: 9 G/DL — LOW (ref 11.5–15.5)
IMM GRANULOCYTES NFR BLD AUTO: 6.7 % — HIGH (ref 0–1.5)
LYMPHOCYTES # BLD AUTO: 1.42 K/UL — SIGNIFICANT CHANGE UP (ref 1–3.3)
LYMPHOCYTES # BLD AUTO: 9.8 % — LOW (ref 13–44)
MAGNESIUM SERPL-MCNC: 1.7 MG/DL — SIGNIFICANT CHANGE UP (ref 1.6–2.6)
MCHC RBC-ENTMCNC: 27.9 PG — SIGNIFICANT CHANGE UP (ref 27–34)
MCHC RBC-ENTMCNC: 30.4 GM/DL — LOW (ref 32–36)
MCV RBC AUTO: 91.6 FL — SIGNIFICANT CHANGE UP (ref 80–100)
MONOCYTES # BLD AUTO: 1.01 K/UL — HIGH (ref 0–0.9)
MONOCYTES NFR BLD AUTO: 7 % — SIGNIFICANT CHANGE UP (ref 2–14)
NEUTROPHILS # BLD AUTO: 10.96 K/UL — HIGH (ref 1.8–7.4)
NEUTROPHILS NFR BLD AUTO: 75.8 % — SIGNIFICANT CHANGE UP (ref 43–77)
NRBC # BLD: 0 /100 WBCS — SIGNIFICANT CHANGE UP (ref 0–0)
PHOSPHATE SERPL-MCNC: 2.4 MG/DL — LOW (ref 2.5–4.5)
PLATELET # BLD AUTO: 355 K/UL — SIGNIFICANT CHANGE UP (ref 150–400)
POTASSIUM SERPL-MCNC: 4.3 MMOL/L — SIGNIFICANT CHANGE UP (ref 3.5–5.3)
POTASSIUM SERPL-SCNC: 4.3 MMOL/L — SIGNIFICANT CHANGE UP (ref 3.5–5.3)
PROT SERPL-MCNC: 5.6 G/DL — LOW (ref 6–8.3)
RBC # BLD: 3.23 M/UL — LOW (ref 3.8–5.2)
RBC # FLD: 13.4 % — SIGNIFICANT CHANGE UP (ref 10.3–14.5)
SODIUM SERPL-SCNC: 137 MMOL/L — SIGNIFICANT CHANGE UP (ref 135–145)
WBC # BLD: 14.46 K/UL — HIGH (ref 3.8–10.5)
WBC # FLD AUTO: 14.46 K/UL — HIGH (ref 3.8–10.5)

## 2021-02-25 PROCEDURE — 99233 SBSQ HOSP IP/OBS HIGH 50: CPT | Mod: GC

## 2021-02-25 RX ORDER — MAGNESIUM SULFATE 500 MG/ML
2 VIAL (ML) INJECTION ONCE
Refills: 0 | Status: COMPLETED | OUTPATIENT
Start: 2021-02-25 | End: 2021-02-25

## 2021-02-25 RX ORDER — LISINOPRIL 2.5 MG/1
40 TABLET ORAL DAILY
Refills: 0 | Status: DISCONTINUED | OUTPATIENT
Start: 2021-02-25 | End: 2021-02-25

## 2021-02-25 RX ORDER — AMLODIPINE BESYLATE 2.5 MG/1
5 TABLET ORAL EVERY 24 HOURS
Refills: 0 | Status: DISCONTINUED | OUTPATIENT
Start: 2021-02-25 | End: 2021-03-01

## 2021-02-25 RX ADMIN — Medication 4 UNIT(S): at 12:57

## 2021-02-25 RX ADMIN — Medication 50 GRAM(S): at 09:18

## 2021-02-25 RX ADMIN — TIOTROPIUM BROMIDE 1 CAPSULE(S): 18 CAPSULE ORAL; RESPIRATORY (INHALATION) at 13:06

## 2021-02-25 RX ADMIN — Medication 3 MILLILITER(S): at 18:39

## 2021-02-25 RX ADMIN — ENOXAPARIN SODIUM 40 MILLIGRAM(S): 100 INJECTION SUBCUTANEOUS at 05:25

## 2021-02-25 RX ADMIN — Medication 3 MILLILITER(S): at 22:07

## 2021-02-25 RX ADMIN — Medication 4 UNIT(S): at 17:08

## 2021-02-25 RX ADMIN — Medication 3 MILLILITER(S): at 12:57

## 2021-02-25 RX ADMIN — SIMVASTATIN 10 MILLIGRAM(S): 20 TABLET, FILM COATED ORAL at 20:30

## 2021-02-25 RX ADMIN — REMDESIVIR 500 MILLIGRAM(S): 5 INJECTION INTRAVENOUS at 18:39

## 2021-02-25 RX ADMIN — Medication 5 MILLIGRAM(S): at 20:31

## 2021-02-25 RX ADMIN — Medication 6: at 22:07

## 2021-02-25 RX ADMIN — Medication 6 MILLIGRAM(S): at 05:25

## 2021-02-25 RX ADMIN — Medication 62.5 MILLIMOLE(S): at 10:38

## 2021-02-25 RX ADMIN — ENOXAPARIN SODIUM 40 MILLIGRAM(S): 100 INJECTION SUBCUTANEOUS at 18:39

## 2021-02-25 RX ADMIN — Medication 8: at 12:56

## 2021-02-25 RX ADMIN — AMLODIPINE BESYLATE 5 MILLIGRAM(S): 2.5 TABLET ORAL at 12:56

## 2021-02-25 RX ADMIN — Medication 3 MILLILITER(S): at 05:25

## 2021-02-25 RX ADMIN — Medication 8: at 17:08

## 2021-02-25 RX ADMIN — PANTOPRAZOLE SODIUM 40 MILLIGRAM(S): 20 TABLET, DELAYED RELEASE ORAL at 05:25

## 2021-02-25 RX ADMIN — ARIPIPRAZOLE 20 MILLIGRAM(S): 15 TABLET ORAL at 13:05

## 2021-02-25 NOTE — PROGRESS NOTE ADULT - PROBLEM SELECTOR PLAN 8
Baseline approx 9. No active signs of bleeding. Ferritin elevated, haptoglobin, transferritin decreased, retic hypoproliferative      #Elevated INR  INR 1.75 as on apixaban 2.5 mg BID since 2/16 for DVT ppx while at [facility] rehab.  - c/w lovenox dvt ppx

## 2021-02-25 NOTE — PROGRESS NOTE ADULT - PROBLEM SELECTOR PLAN 1
#Acute Hypoxic Respiratory Failure   Due to COVID as per below. Lasix 40mg PO d/c'd during January hospital admission due to FACUNDO.   - Low suspicion for PE as 2/23 LE doppler negative for DVT, stable O2 requirements, low d-dimer,.  - consider restarting lasix 40mg PO if evidence of fluid overload    #Pneumonia 2/2 to COVID PNA.   - c/w Decadron 6mg for 10 day course until 2/25 (do 5 more days as pt has been on since 2/16)  - c/w Remdesevir  2/21- 2/25    #Metabolic alkalosis  Patient had been discharged in previous January adm on Na Bicarb in setting of HAGMa from renal failure. Renal function found to be recovered on this adm, with no AG. This metabolic alkalosis likely iatrogenic from the Na bicarb.   - hold na bicarb

## 2021-02-25 NOTE — PROGRESS NOTE ADULT - ASSESSMENT
69 yo F with past medical history of  Asthma (Dx after 9/11 attack) HTN, DM, HLD, L hip arthritis, Bipolar Disorder recently admitted for UTI with E. Coli bacteremia that presented from [facility] due to hypoxia, admitted to [facility] for management of COVID infection (+ 2/21), now stepped up to tele for HFNC.

## 2021-02-25 NOTE — PROGRESS NOTE ADULT - PROBLEM SELECTOR PLAN 7
Home med: lisinopril 40mg at home (though more for leg swelling), dc'ed on previous january hospital adm i/s/o low BP and FACUNDO  - hold for now    #Lower extremity edema.    Reports starting Lasix for LE edema. No Dx of CHF.  - Was held on previous admission and discharge as mentioned above    #HLD  -Continue simvastatin 10 mg QD home med Home med: lisinopril 40mg at home (though more for leg swelling), W[place]'ed on previous january hospital adm i/s/o low BP and FACUNDO  - Will consider starting on amlodipine 2.5mg daily     #Lower extremity edema.    Reports starting Lasix for LE edema. No Dx of CHF.  - Was held on previous admission and discharge as mentioned above    #HLD  -Continue simvastatin 10 mg QD home med Home med: lisinopril 40mg at home (though more for leg swelling), KS'ed on previous january hospital adm i/s/o low BP and FACUNDO  - Starting on amlodipine 5mg    #Lower extremity edema.    Reports starting Lasix for LE edema. No Dx of CHF.  - Was held on previous admission and discharge as mentioned above    #HLD  -Continue simvastatin 10 mg QD home med

## 2021-02-25 NOTE — PROGRESS NOTE ADULT - PROBLEM SELECTOR PLAN 2
-Symptomatic starting around a week ago. Positive test 2/21  -Initially hypoxic on RA, meeting criteria for remdesivir and dexamethasone, will give remdesivir pending CC approval and continue dexamethasone at 6 mg for 10 day course (previously given 8 mg for 5 days, starting 2/16)  - Currently on HFNC 40L 50%, wean as tolerated -Symptomatic starting around a week ago. Positive test 2/21  -Initially hypoxic on RA, meeting criteria for remdesivir and dexamethasone, will give remdesivir pending CC approval and continue dexamethasone at 6 mg for 10 day course (previously given 8 mg for 5 days, starting 2/16)  - Currently on HFNC 40L 50%, will decrease back down to 40L 40% and then trial on NC if tolerating

## 2021-02-25 NOTE — PROGRESS NOTE ADULT - SUBJECTIVE AND OBJECTIVE BOX
INCOMPLETE INCOMPLETE    O/N Events: OSIRIS    Subjective/ROS: Patient seen and examined at bedside. Says that she is breathing well on the HFNC, no cough. Reports that she has had trouble sleeping, would like melatonin tonight,     Denies Fever/Chills, HA, CP, SOB, n/v, changes in bowel/urinary habits.  12pt ROS otherwise negative.    VITALS  Vital Signs Last 24 Hrs  T(C): 36.8 (25 Feb 2021 04:51), Max: 36.8 (24 Feb 2021 13:32)  T(F): 98.2 (25 Feb 2021 04:51), Max: 98.3 (24 Feb 2021 13:32)  HR: 66 (25 Feb 2021 04:17) (54 - 78)  BP: 143/59 (25 Feb 2021 04:17) (97/51 - 158/72)  BP(mean): 81 (25 Feb 2021 04:17) (81 - 81)  RR: 24 (25 Feb 2021 04:17) (16 - 24)  SpO2: 94% (25 Feb 2021 04:17) (90% - 94%)    CAPILLARY BLOOD GLUCOSE      POCT Blood Glucose.: 123 mg/dL (25 Feb 2021 06:57)  POCT Blood Glucose.: 209 mg/dL (24 Feb 2021 21:54)  POCT Blood Glucose.: 250 mg/dL (24 Feb 2021 16:15)  POCT Blood Glucose.: 289 mg/dL (24 Feb 2021 10:54)      PHYSICAL EXAM  General: NAD  Head: NC/AT; MMM; PERRL; EOMI;  Neck: Supple; no JVD  Respiratory: CTAB; no wheezes/rales/rhonchi  Cardiovascular: Regular rhythm/rate; S1/S2+, no murmurs, rubs gallops   Gastrointestinal: Soft; NTND; bowel sounds normal and present  Extremities: WWP; no edema/cyanosis  Neurological: A&Ox3, R>L upper extremity resting and intention tremor     MEDICATIONS  (STANDING):  albuterol/ipratropium for Nebulization 3 milliLiter(s) Nebulizer every 6 hours  ARIPiprazole 20 milliGRAM(s) Oral daily  dextrose 40% Gel 15 Gram(s) Oral once  dextrose 5%. 1000 milliLiter(s) (50 mL/Hr) IV Continuous <Continuous>  dextrose 5%. 1000 milliLiter(s) (100 mL/Hr) IV Continuous <Continuous>  dextrose 50% Injectable 25 Gram(s) IV Push once  dextrose 50% Injectable 12.5 Gram(s) IV Push once  dextrose 50% Injectable 25 Gram(s) IV Push once  enoxaparin Injectable 40 milliGRAM(s) SubCutaneous every 12 hours  glucagon  Injectable 1 milliGRAM(s) IntraMuscular once  insulin lispro (ADMELOG) corrective regimen sliding scale   SubCutaneous Before meals and at bedtime  insulin lispro Injectable (ADMELOG) 4 Unit(s) SubCutaneous before lunch  insulin lispro Injectable (ADMELOG) 4 Unit(s) SubCutaneous before dinner  melatonin 5 milliGRAM(s) Oral at bedtime  pantoprazole    Tablet 40 milliGRAM(s) Oral before breakfast  remdesivir  IVPB 100 milliGRAM(s) IV Intermittent every 24 hours  remdesivir  IVPB   IV Intermittent   simvastatin 10 milliGRAM(s) Oral at bedtime  tiotropium 18 MICROgram(s) Capsule 1 Capsule(s) Inhalation daily    MEDICATIONS  (PRN):  ALBUTerol    90 MICROgram(s) HFA Inhaler 2 Puff(s) Inhalation every 6 hours PRN SOB  simethicone 80 milliGRAM(s) Chew daily PRN Gas      No Known Allergies      LABS                        8.4    10.88 )-----------( 325      ( 24 Feb 2021 06:59 )             26.7     02-24    140  |  104  |  23  ----------------------------<  101<H>  3.7   |  29  |  0.63    Ca    8.8      24 Feb 2021 06:59  Phos  2.4     02-24  Mg     1.8     02-24    TPro  5.4<L>  /  Alb  2.6<L>  /  TBili  0.4  /  DBili  x   /  AST  14  /  ALT  10  /  AlkPhos  62  02-24                IMAGING/EKG/ETC   INCOMPLETE    O/N Events: OSIRIS    Subjective/ROS: Patient seen and examined at bedside. Says that she is breathing well on the HFNC, no cough. Reports that she has had trouble sleeping, would like melatonin tonight,     Denies Fever/Chills, HA, CP, SOB, n/v, changes in bowel/urinary habits.  12pt ROS otherwise negative.    VITALS  Vital Signs Last 24 Hrs  T(C): 36.8 (25 Feb 2021 04:51), Max: 36.8 (24 Feb 2021 13:32)  T(F): 98.2 (25 Feb 2021 04:51), Max: 98.3 (24 Feb 2021 13:32)  HR: 66 (25 Feb 2021 04:17) (54 - 78)  BP: 143/59 (25 Feb 2021 04:17) (97/51 - 158/72)  BP(mean): 81 (25 Feb 2021 04:17) (81 - 81)  RR: 24 (25 Feb 2021 04:17) (16 - 24)  SpO2: 94% (25 Feb 2021 04:17) (90% - 94%)    CAPILLARY BLOOD GLUCOSE      POCT Blood Glucose.: 123 mg/dL (25 Feb 2021 06:57)  POCT Blood Glucose.: 209 mg/dL (24 Feb 2021 21:54)  POCT Blood Glucose.: 250 mg/dL (24 Feb 2021 16:15)  POCT Blood Glucose.: 289 mg/dL (24 Feb 2021 10:54)      PHYSICAL EXAM  General: NAD  Head: NC/AT; MMM; PERRL; EOMI;  Neck: Supple; no JVD  Respiratory: CTAB; no wheezes/rales/rhonchi  Cardiovascular: Regular rhythm/rate; S1/S2+, no murmurs, rubs gallops   Gastrointestinal: Soft; NTND; bowel sounds normal and present  Extremities: WWP; no edema/cyanosis  Neurological: A&Ox3, R>L upper extremity resting and intention tremor     MEDICATIONS  (STANDING):  albuterol/ipratropium for Nebulization 3 milliLiter(s) Nebulizer every 6 hours  ARIPiprazole 20 milliGRAM(s) Oral daily  dextrose 40% Gel 15 Gram(s) Oral once  dextrose 5%. 1000 milliLiter(s) (50 mL/Hr) IV Continuous <Continuous>  dextrose 5%. 1000 milliLiter(s) (100 mL/Hr) IV Continuous <Continuous>  dextrose 50% Injectable 25 Gram(s) IV Push once  dextrose 50% Injectable 12.5 Gram(s) IV Push once  dextrose 50% Injectable 25 Gram(s) IV Push once  enoxaparin Injectable 40 milliGRAM(s) SubCutaneous every 12 hours  glucagon  Injectable 1 milliGRAM(s) IntraMuscular once  insulin lispro (ADMELOG) corrective regimen sliding scale   SubCutaneous Before meals and at bedtime  insulin lispro Injectable (ADMELOG) 4 Unit(s) SubCutaneous before lunch  insulin lispro Injectable (ADMELOG) 4 Unit(s) SubCutaneous before dinner  melatonin 5 milliGRAM(s) Oral at bedtime  pantoprazole    Tablet 40 milliGRAM(s) Oral before breakfast  remdesivir  IVPB 100 milliGRAM(s) IV Intermittent every 24 hours  remdesivir  IVPB   IV Intermittent   simvastatin 10 milliGRAM(s) Oral at bedtime  tiotropium 18 MICROgram(s) Capsule 1 Capsule(s) Inhalation daily    MEDICATIONS  (PRN):  ALBUTerol    90 MICROgram(s) HFA Inhaler 2 Puff(s) Inhalation every 6 hours PRN SOB  simethicone 80 milliGRAM(s) Chew daily PRN Gas      No Known Allergies      LABS                        9.0    14.46 )-----------( 355      ( 25 Feb 2021 08:20 )             29.6     02-25    137  |  102  |  21  ----------------------------<  130<H>  4.3   |  27  |  0.61    Ca    8.8      25 Feb 2021 08:20  Phos  2.4     02-25  Mg     1.7     02-25    TPro  5.6<L>  /  Alb  2.5<L>  /  TBili  0.4  /  DBili  x   /  AST  15  /  ALT  11  /  AlkPhos  76  02-25               O/N Events: OSIRIS    Subjective/ROS: Patient seen and examined at bedside. Says that she is breathing well on the HFNC, no cough. Reports that she has had trouble sleeping, would like melatonin tonight    Denies Fever/Chills, HA, CP, SOB, n/v, changes in bowel/urinary habits.  12pt ROS otherwise negative.    VITALS  Vital Signs Last 24 Hrs  T(C): 36.8 (25 Feb 2021 04:51), Max: 36.8 (24 Feb 2021 13:32)  T(F): 98.2 (25 Feb 2021 04:51), Max: 98.3 (24 Feb 2021 13:32)  HR: 66 (25 Feb 2021 04:17) (54 - 78)  BP: 143/59 (25 Feb 2021 04:17) (97/51 - 158/72)  BP(mean): 81 (25 Feb 2021 04:17) (81 - 81)  RR: 24 (25 Feb 2021 04:17) (16 - 24)  SpO2: 94% (25 Feb 2021 04:17) (90% - 94%)    CAPILLARY BLOOD GLUCOSE      POCT Blood Glucose.: 123 mg/dL (25 Feb 2021 06:57)  POCT Blood Glucose.: 209 mg/dL (24 Feb 2021 21:54)  POCT Blood Glucose.: 250 mg/dL (24 Feb 2021 16:15)  POCT Blood Glucose.: 289 mg/dL (24 Feb 2021 10:54)      PHYSICAL EXAM  General: NAD  Head: NC/AT; MMM; PERRL; EOMI;  Neck: Supple; no JVD  Respiratory: CTAB; no wheezes/rales/rhonchi  Cardiovascular: Regular rhythm/rate; S1/S2+, no murmurs, rubs gallops   Gastrointestinal: Soft; NTND; bowel sounds normal and present  Extremities: WWP; no edema/cyanosis  Neurological: A&Ox3, R>L upper extremity resting and intention tremor     MEDICATIONS  (STANDING):  albuterol/ipratropium for Nebulization 3 milliLiter(s) Nebulizer every 6 hours  ARIPiprazole 20 milliGRAM(s) Oral daily  dextrose 40% Gel 15 Gram(s) Oral once  dextrose 5%. 1000 milliLiter(s) (50 mL/Hr) IV Continuous <Continuous>  dextrose 5%. 1000 milliLiter(s) (100 mL/Hr) IV Continuous <Continuous>  dextrose 50% Injectable 25 Gram(s) IV Push once  dextrose 50% Injectable 12.5 Gram(s) IV Push once  dextrose 50% Injectable 25 Gram(s) IV Push once  enoxaparin Injectable 40 milliGRAM(s) SubCutaneous every 12 hours  glucagon  Injectable 1 milliGRAM(s) IntraMuscular once  insulin lispro (ADMELOG) corrective regimen sliding scale   SubCutaneous Before meals and at bedtime  insulin lispro Injectable (ADMELOG) 4 Unit(s) SubCutaneous before lunch  insulin lispro Injectable (ADMELOG) 4 Unit(s) SubCutaneous before dinner  melatonin 5 milliGRAM(s) Oral at bedtime  pantoprazole    Tablet 40 milliGRAM(s) Oral before breakfast  remdesivir  IVPB 100 milliGRAM(s) IV Intermittent every 24 hours  remdesivir  IVPB   IV Intermittent   simvastatin 10 milliGRAM(s) Oral at bedtime  tiotropium 18 MICROgram(s) Capsule 1 Capsule(s) Inhalation daily    MEDICATIONS  (PRN):  ALBUTerol    90 MICROgram(s) HFA Inhaler 2 Puff(s) Inhalation every 6 hours PRN SOB  simethicone 80 milliGRAM(s) Chew daily PRN Gas      No Known Allergies      LABS                        9.0    14.46 )-----------( 355      ( 25 Feb 2021 08:20 )             29.6     02-25    137  |  102  |  21  ----------------------------<  130<H>  4.3   |  27  |  0.61    Ca    8.8      25 Feb 2021 08:20  Phos  2.4     02-25  Mg     1.7     02-25    TPro  5.6<L>  /  Alb  2.5<L>  /  TBili  0.4  /  DBili  x   /  AST  15  /  ALT  11  /  AlkPhos  76  02-25

## 2021-02-26 LAB
ALBUMIN SERPL ELPH-MCNC: 2.4 G/DL — LOW (ref 3.3–5)
ALP SERPL-CCNC: 75 U/L — SIGNIFICANT CHANGE UP (ref 40–120)
ALT FLD-CCNC: 9 U/L — LOW (ref 10–45)
ANION GAP SERPL CALC-SCNC: 7 MMOL/L — SIGNIFICANT CHANGE UP (ref 5–17)
ANISOCYTOSIS BLD QL: SLIGHT — SIGNIFICANT CHANGE UP
AST SERPL-CCNC: 12 U/L — SIGNIFICANT CHANGE UP (ref 10–40)
BASOPHILS # BLD AUTO: 0 K/UL — SIGNIFICANT CHANGE UP (ref 0–0.2)
BASOPHILS NFR BLD AUTO: 0 % — SIGNIFICANT CHANGE UP (ref 0–2)
BILIRUB SERPL-MCNC: 0.5 MG/DL — SIGNIFICANT CHANGE UP (ref 0.2–1.2)
BLD GP AB SCN SERPL QL: NEGATIVE — SIGNIFICANT CHANGE UP
BUN SERPL-MCNC: 19 MG/DL — SIGNIFICANT CHANGE UP (ref 7–23)
BURR CELLS BLD QL SMEAR: PRESENT — SIGNIFICANT CHANGE UP
CALCIUM SERPL-MCNC: 9 MG/DL — SIGNIFICANT CHANGE UP (ref 8.4–10.5)
CHLORIDE SERPL-SCNC: 102 MMOL/L — SIGNIFICANT CHANGE UP (ref 96–108)
CO2 SERPL-SCNC: 26 MMOL/L — SIGNIFICANT CHANGE UP (ref 22–31)
CREAT SERPL-MCNC: 0.58 MG/DL — SIGNIFICANT CHANGE UP (ref 0.5–1.3)
CRP SERPL-MCNC: 0.78 MG/DL — HIGH (ref 0–0.4)
CULTURE RESULTS: SIGNIFICANT CHANGE UP
CULTURE RESULTS: SIGNIFICANT CHANGE UP
D DIMER BLD IA.RAPID-MCNC: 314 NG/ML DDU — HIGH
DACRYOCYTES BLD QL SMEAR: SLIGHT — SIGNIFICANT CHANGE UP
EOSINOPHIL # BLD AUTO: 0.15 K/UL — SIGNIFICANT CHANGE UP (ref 0–0.5)
EOSINOPHIL NFR BLD AUTO: 0.9 % — SIGNIFICANT CHANGE UP (ref 0–6)
FERRITIN SERPL-MCNC: 377 NG/ML — HIGH (ref 15–150)
GLUCOSE BLDC GLUCOMTR-MCNC: 110 MG/DL — HIGH (ref 70–99)
GLUCOSE BLDC GLUCOMTR-MCNC: 161 MG/DL — HIGH (ref 70–99)
GLUCOSE BLDC GLUCOMTR-MCNC: 171 MG/DL — HIGH (ref 70–99)
GLUCOSE BLDC GLUCOMTR-MCNC: 204 MG/DL — HIGH (ref 70–99)
GLUCOSE SERPL-MCNC: 109 MG/DL — HIGH (ref 70–99)
HCT VFR BLD CALC: 28.8 % — LOW (ref 34.5–45)
HGB BLD-MCNC: 9.1 G/DL — LOW (ref 11.5–15.5)
HYPOCHROMIA BLD QL: SLIGHT — SIGNIFICANT CHANGE UP
LYMPHOCYTES # BLD AUTO: 1.98 K/UL — SIGNIFICANT CHANGE UP (ref 1–3.3)
LYMPHOCYTES # BLD AUTO: 12.2 % — LOW (ref 13–44)
MACROCYTES BLD QL: SLIGHT — SIGNIFICANT CHANGE UP
MAGNESIUM SERPL-MCNC: 1.5 MG/DL — LOW (ref 1.6–2.6)
MANUAL SMEAR VERIFICATION: SIGNIFICANT CHANGE UP
MCHC RBC-ENTMCNC: 28 PG — SIGNIFICANT CHANGE UP (ref 27–34)
MCHC RBC-ENTMCNC: 31.6 GM/DL — LOW (ref 32–36)
MCV RBC AUTO: 88.6 FL — SIGNIFICANT CHANGE UP (ref 80–100)
METAMYELOCYTES # FLD: 1.7 % — HIGH (ref 0–0)
MICROCYTES BLD QL: SLIGHT — SIGNIFICANT CHANGE UP
MONOCYTES # BLD AUTO: 0.7 K/UL — SIGNIFICANT CHANGE UP (ref 0–0.9)
MONOCYTES NFR BLD AUTO: 4.3 % — SIGNIFICANT CHANGE UP (ref 2–14)
MYELOCYTES NFR BLD: 0.9 % — HIGH (ref 0–0)
NEUTROPHILS # BLD AUTO: 13 K/UL — HIGH (ref 1.8–7.4)
NEUTROPHILS NFR BLD AUTO: 79.1 % — HIGH (ref 43–77)
NEUTS BAND # BLD: 0.9 % — SIGNIFICANT CHANGE UP (ref 0–8)
OVALOCYTES BLD QL SMEAR: SLIGHT — SIGNIFICANT CHANGE UP
PHOSPHATE SERPL-MCNC: 2.4 MG/DL — LOW (ref 2.5–4.5)
PLAT MORPH BLD: NORMAL — SIGNIFICANT CHANGE UP
PLATELET # BLD AUTO: 363 K/UL — SIGNIFICANT CHANGE UP (ref 150–400)
POIKILOCYTOSIS BLD QL AUTO: SIGNIFICANT CHANGE UP
POLYCHROMASIA BLD QL SMEAR: SLIGHT — SIGNIFICANT CHANGE UP
POTASSIUM SERPL-MCNC: 4.6 MMOL/L — SIGNIFICANT CHANGE UP (ref 3.5–5.3)
POTASSIUM SERPL-SCNC: 4.6 MMOL/L — SIGNIFICANT CHANGE UP (ref 3.5–5.3)
PROT SERPL-MCNC: 5.6 G/DL — LOW (ref 6–8.3)
RBC # BLD: 3.25 M/UL — LOW (ref 3.8–5.2)
RBC # FLD: 13.5 % — SIGNIFICANT CHANGE UP (ref 10.3–14.5)
RBC BLD AUTO: ABNORMAL
RH IG SCN BLD-IMP: POSITIVE — SIGNIFICANT CHANGE UP
SCHISTOCYTES BLD QL AUTO: SLIGHT — SIGNIFICANT CHANGE UP
SODIUM SERPL-SCNC: 135 MMOL/L — SIGNIFICANT CHANGE UP (ref 135–145)
SPECIMEN SOURCE: SIGNIFICANT CHANGE UP
SPECIMEN SOURCE: SIGNIFICANT CHANGE UP
WBC # BLD: 16.25 K/UL — HIGH (ref 3.8–10.5)
WBC # FLD AUTO: 16.25 K/UL — HIGH (ref 3.8–10.5)

## 2021-02-26 PROCEDURE — 99233 SBSQ HOSP IP/OBS HIGH 50: CPT | Mod: GC

## 2021-02-26 RX ORDER — MAGNESIUM SULFATE 500 MG/ML
2 VIAL (ML) INJECTION EVERY 4 HOURS
Refills: 0 | Status: COMPLETED | OUTPATIENT
Start: 2021-02-26 | End: 2021-02-26

## 2021-02-26 RX ADMIN — Medication 4 UNIT(S): at 18:32

## 2021-02-26 RX ADMIN — Medication 50 GRAM(S): at 09:35

## 2021-02-26 RX ADMIN — Medication 5 MILLIGRAM(S): at 21:39

## 2021-02-26 RX ADMIN — Medication 3 MILLILITER(S): at 12:58

## 2021-02-26 RX ADMIN — Medication 2: at 22:27

## 2021-02-26 RX ADMIN — TIOTROPIUM BROMIDE 1 CAPSULE(S): 18 CAPSULE ORAL; RESPIRATORY (INHALATION) at 18:28

## 2021-02-26 RX ADMIN — Medication 50 GRAM(S): at 12:57

## 2021-02-26 RX ADMIN — ENOXAPARIN SODIUM 40 MILLIGRAM(S): 100 INJECTION SUBCUTANEOUS at 05:48

## 2021-02-26 RX ADMIN — Medication 4: at 13:00

## 2021-02-26 RX ADMIN — Medication 2: at 18:30

## 2021-02-26 RX ADMIN — SIMVASTATIN 10 MILLIGRAM(S): 20 TABLET, FILM COATED ORAL at 21:40

## 2021-02-26 RX ADMIN — Medication 3 MILLILITER(S): at 05:48

## 2021-02-26 RX ADMIN — Medication 62.5 MILLIMOLE(S): at 10:10

## 2021-02-26 RX ADMIN — AMLODIPINE BESYLATE 5 MILLIGRAM(S): 2.5 TABLET ORAL at 12:57

## 2021-02-26 RX ADMIN — Medication 4 UNIT(S): at 13:04

## 2021-02-26 RX ADMIN — ENOXAPARIN SODIUM 40 MILLIGRAM(S): 100 INJECTION SUBCUTANEOUS at 18:28

## 2021-02-26 RX ADMIN — PANTOPRAZOLE SODIUM 40 MILLIGRAM(S): 20 TABLET, DELAYED RELEASE ORAL at 05:48

## 2021-02-26 RX ADMIN — Medication 3 MILLILITER(S): at 18:28

## 2021-02-26 RX ADMIN — ARIPIPRAZOLE 20 MILLIGRAM(S): 15 TABLET ORAL at 13:04

## 2021-02-26 NOTE — PROGRESS NOTE ADULT - PROBLEM SELECTOR PLAN 1
#Acute Hypoxic Respiratory Failure   Due to COVID as per below. Lasix 40mg PO d/c'd during January hospital admission due to FACUNDO.   - Low suspicion for PE as 2/23 LE doppler negative for DVT, stable O2 requirements, low d-dimer,.  - consider restarting lasix 40mg PO if evidence of fluid overload    #Pneumonia 2/2 to COVID PNA.   - s/p Decadron 6mg for 10 day course until 2/25 (do 5 more days as pt has been on since 2/16)  - s/p Remdesevir  2/21- 2/25    #Metabolic alkalosis  Patient had been discharged in previous January adm on Na Bicarb in setting of HAGMa from renal failure. Renal function found to be recovered on this adm, with no AG. This metabolic alkalosis likely iatrogenic from the Na bicarb.   - hold na bicarb

## 2021-02-26 NOTE — PROGRESS NOTE ADULT - ASSESSMENT
71 yo F with past medical history of  Asthma (Dx after 9/11 attack) HTN, DM, HLD, L hip arthritis, Bipolar Disorder recently admitted for UTI with E. Coli bacteremia that presented from U due to hypoxia, admitted to [facility] for management of COVID infection (+ 2/21), now stepped up to tele for HFNC.

## 2021-02-26 NOTE — PROGRESS NOTE ADULT - PROBLEM SELECTOR PLAN 2
-Symptomatic starting around a week ago. Positive test 2/21  -Initially hypoxic on RA, s/p remdesivir and dexamethasone  - Currently on HFNC 40L 35%, will trial on NC if tolerating -Symptomatic starting around a week ago. Positive test 2/21  -Initially hypoxic on RA, s/p remdesivir and dexamethasone  - Currently on HFNC 40L 35%, will trial on NRB and/or NC today, OOBTC

## 2021-02-26 NOTE — PROGRESS NOTE ADULT - PROBLEM SELECTOR PLAN 8
Baseline approx 9. No active signs of bleeding. Ferritin elevated, haptoglobin, transferritin decreased, retic hypoproliferative      #Elevated INR  INR 1.75 as on apixaban 2.5 mg BID since 2/16 for DVT ppx while at [facility] rehab.  - c/w lovenox dvt ppx

## 2021-02-26 NOTE — PROGRESS NOTE ADULT - SUBJECTIVE AND OBJECTIVE BOX
Physical Medicine and Rehabilitation Progress Note:    Patient is a 70y old  Female who presents with a chief complaint of COVID (26 Feb 2021 05:13)      HPI:  69 yo F with past medical history of  Asthma (Dx after 9/11 attack) HTN, DM, HLD, L hip arthritis, Bipolar Disorder recently admitted for UTI with E. Coli bacteremia (complicated by sepsis, with acute kidney injury, rhabdomyolysis, and transaminitis, dc'd to subacute rehab facility, now presenting from [facility] rehab due to SOB and hypoxia. Pt states she has been having SOB and mild cough starting a week ago (with some nighttime awakenings, gasping for air) and was told she had "viral PNA". She was treated decadron 8mg (started on 2/16/21) and levaquin (finished today). Pt reports minimal to no improvement in her symptoms and was noted to by hypoxic on vitals this AM. Pt was placed on NRB with improvement to 93%. Pt states that when she does not have supplemental oxygen she feels like is gasping for air. States since her symptoms started her asthma has flared as well, requiring nebulizer treatments which do help. Right now denies any chest tightness or need for nebulizers. Denies any nausea, vomiting, diarrhea, abdominal pain, back pain, or flank pain. Pt states she does have tremors as a side affect from her abilify, and when she began to felt sick her tremors worsened.    ED Course  Vitals: T 98.9, HR 86, /80, RR 22, Spo2 85% on RA  Labs: Notable for WBC 12.46, Hb 9.2, lymphocyte % 4.7, INR 1.75, , Lactate 1.3 (<2), procal 0.06, BNP 2034, VBG 7.49, Pc02 40, Po2 44, Hc03 30  Imaging: CXR mild cardiomegaly. Bilateral infiltrates. Degenerative changes thoracic spine  Received tyl 650 x1.   ICU initially consulted for hypoxia as pt was put on NRB. Was assessed, found to be in no respiratory distress, saturating 92-94%, with decision that COVID icu/tele monitoring not needed at this time.     ROS:  Otherwise negative, except as specified in HPI.  PMH:  PAST MEDICAL & SURGICAL HISTORY:  HLD (hyperlipidemia)    Bipolar disorder  Arthritis of left hip  Asthma  Diabetes mellitus  HTN (hypertension)  No significant past surgical history    ALLERGIES:  Allergies  No Known Allergies  Intolerances    MEDICATIONS:  Home Medications:  ARIPiprazole 20 mg oral tablet: 1 tab(s) orally once a day (25 Jan 2021 11:01)  insulin lispro 100 units/mL injectable solution: Sliding scale coverage three times a day with meals and before bedtime   Sugar 150-200: 2 units  201-250: 4 units   251-300: 6 units  301-350: 8 units (29 Jan 2021 16:06)  ipratropium-albuterol 0.5 mg-2.5 mg/3 mLinhalation solution: 3 milliliter(s) inhaled every 4 hours (29 Jan 2021 16:06)  pantoprazole 40 mg oral delayed release tablet: 1 tab(s) orally once a day (before a meal) (29 Jan 2021 16:06)  rosuvastatin 5 mg oral tablet: 1 tab(s) orally once a day (25 Jan 2021 11:01)  simethicone 80 mg oral tablet, chewable: 1 tab(s) orally once a day, As needed, Gas (29 Jan 2021 16:06)  sodium bicarbonate 650 mg oral tablet: 1 tab(s) orally every 12 hours (29 Jan 2021 16:06) (21 Feb 2021 16:11)                            9.1    16.25 )-----------( 363      ( 26 Feb 2021 07:53 )             28.8       02-26    135  |  102  |  19  ----------------------------<  109<H>  4.6   |  26  |  0.58    Ca    9.0      26 Feb 2021 07:53  Phos  2.4     02-26  Mg     1.5     02-26    TPro  5.6<L>  /  Alb  2.4<L>  /  TBili  0.5  /  DBili  x   /  AST  12  /  ALT  9<L>  /  AlkPhos  75  02-26    Vital Signs Last 24 Hrs  T(C): 36.4 (26 Feb 2021 09:28), Max: 37 (25 Feb 2021 17:17)  T(F): 97.6 (26 Feb 2021 09:28), Max: 98.6 (25 Feb 2021 17:17)  HR: 64 (26 Feb 2021 09:30) (64 - 96)  BP: 145/59 (26 Feb 2021 05:36) (145/59 - 161/56)  BP(mean): 81 (26 Feb 2021 05:36) (81 - 83)  RR: 23 (26 Feb 2021 09:30) (18 - 24)  SpO2: 93% (26 Feb 2021 09:30) (91% - 94%)    MEDICATIONS  (STANDING):  albuterol/ipratropium for Nebulization 3 milliLiter(s) Nebulizer every 6 hours  amLODIPine   Tablet 5 milliGRAM(s) Oral every 24 hours  ARIPiprazole 20 milliGRAM(s) Oral daily  dextrose 40% Gel 15 Gram(s) Oral once  dextrose 5%. 1000 milliLiter(s) (50 mL/Hr) IV Continuous <Continuous>  dextrose 5%. 1000 milliLiter(s) (100 mL/Hr) IV Continuous <Continuous>  dextrose 50% Injectable 25 Gram(s) IV Push once  dextrose 50% Injectable 12.5 Gram(s) IV Push once  dextrose 50% Injectable 25 Gram(s) IV Push once  enoxaparin Injectable 40 milliGRAM(s) SubCutaneous every 12 hours  glucagon  Injectable 1 milliGRAM(s) IntraMuscular once  insulin lispro (ADMELOG) corrective regimen sliding scale   SubCutaneous Before meals and at bedtime  insulin lispro Injectable (ADMELOG) 4 Unit(s) SubCutaneous before lunch  insulin lispro Injectable (ADMELOG) 4 Unit(s) SubCutaneous before dinner  magnesium sulfate  IVPB 2 Gram(s) IV Intermittent every 4 hours  melatonin 5 milliGRAM(s) Oral at bedtime  pantoprazole    Tablet 40 milliGRAM(s) Oral before breakfast  simvastatin 10 milliGRAM(s) Oral at bedtime  tiotropium 18 MICROgram(s) Capsule 1 Capsule(s) Inhalation daily    MEDICATIONS  (PRN):  ALBUTerol    90 MICROgram(s) HFA Inhaler 2 Puff(s) Inhalation every 6 hours PRN SOB  simethicone 80 milliGRAM(s) Chew daily PRN Gas    Currently Undergoing Physical/ Occupational Therapy at bedside.    Functional Status Assessment:   2/25/2021    Cognitive/Perceptual/Neuro  Cognitive/Neuro/Behavioral [WDL Definition: Alert; opens eyes spontaneously; arouses to voice or touch; oriented x 4; follows commands; speech spontaneous, logical; purposeful motor response; behavior appropriate to situation]: WDL  Level of Consciousness: alert  Orientation: oriented x 4  Speech: logical;  clear;  spontaneous  Mood/Behavior: behavior appropriate to situation    Language Assistance  Preferred Language to Address Healthcare Preferred Language to Address Healthcare: English    Therapeutic Interventions      Bed Mobility  Bed Mobility Training Rehab Potential: good, to achieve stated therapy goals  Bed Mobility Training Rolling/Turning: independent;  verbal cues;  bed rails  Bed Mobility Training Scooting: maximum assist (25% patient effort);  moderate assist (50% patient effort);  2 person assist  Bed Mobility Training Sit-to-Supine: moderate assist (50% patient effort);  2 person assist  Bed Mobility Training Supine-to-Sit: moderate assist (50% patient effort);  2 person assist;  verbal cues  Bed Mobility Training Limitations: decreased strength    Sit-Stand Transfer Training  Sit-to-Stand Transfer Training Rehab Potential: good, to achieve stated therapy goals  Transfer Training Sit-to-Stand Transfer: minimum assist (75% patient effort);  2 person assist;  verbal cues;  weight-bearing as tolerated   rolling walker  Transfer Training Stand-to-Sit Transfer: minimum assist (75% patient effort);  2 person assist;  verbal cues;  weight-bearing as tolerated   rolling walker  Sit-to-Stand Transfer Training Transfer Safety Analysis: decreased weight-shifting ability;  decreased strength;  impaired balance    Gait Training  Gait Training Rehab Potential: good, to achieve stated therapy goals  Gait Training: minimum assist (75% patient effort);  2 person assist;  weight-bearing as tolerated   rolling walker;  8 side steps  Gait Analysis: increased time in double stance;  decreased latoya;  decreased step length;  decreased weight-shifting ability;  impaired balance;  decreased strength    Therapeutic Exercise  Therapeutic Exercise Rehab Effort: good  Therapeutic Exercise Detail: AP, LAQ, hip flexion x 10 bilat.           PM&R Impression: as above    Current Disposition Plan Recommendations:    subacute rehab placement

## 2021-02-26 NOTE — PROGRESS NOTE ADULT - PROBLEM SELECTOR PLAN 7
Home med: lisinopril 40mg at home (though more for leg swelling), AK'ed [facility] i/s/o low BP and FACUNDO  - Continue amlodipine 5mg    #Lower extremity edema.    Reports starting Lasix for LE edema. No Dx of CHF.  - Was held on previous admission and discharge as mentioned above    #HLD  -Continue simvastatin 10 mg QD home med

## 2021-02-26 NOTE — PROGRESS NOTE ADULT - SUBJECTIVE AND OBJECTIVE BOX
INCOMPLETE INCOMPLETE    O/N Events: OSIRIS    Subjective/ROS: Patient seen and examined at bedside. Says that she believes her breathing is good today. Not having any cough. Slept better last night with melatonin. Discussed the plan to try to wean her oxygen today     Denies Fever/Chills, HA, CP, SOB, n/v, changes in bowel/urinary habits.  12pt ROS otherwise negative.    VITALS  Vital Signs Last 24 Hrs  T(C): 36.7 (26 Feb 2021 04:45), Max: 37 (25 Feb 2021 17:17)  T(F): 98.1 (26 Feb 2021 04:45), Max: 98.6 (25 Feb 2021 17:17)  HR: 68 (26 Feb 2021 05:36) (60 - 96)  BP: 145/59 (26 Feb 2021 05:36) (145/58 - 161/56)  BP(mean): 81 (26 Feb 2021 05:36) (81 - 83)  RR: 22 (26 Feb 2021 05:36) (18 - 24)  SpO2: 92% (26 Feb 2021 05:36) (90% - 94%)    CAPILLARY BLOOD GLUCOSE      POCT Blood Glucose.: 110 mg/dL (26 Feb 2021 06:48)  POCT Blood Glucose.: 268 mg/dL (25 Feb 2021 21:30)  POCT Blood Glucose.: 306 mg/dL (25 Feb 2021 16:23)  POCT Blood Glucose.: 341 mg/dL (25 Feb 2021 12:37)      PHYSICAL EXAM  General: NAD, lying in bed with eye mask on  Head: NC/AT  Neck: Supple; no JVD  Respiratory: CTAB; no wheezes/rales/rhonchi  Cardiovascular: Regular rhythm/rate; S1/S2+, no murmurs, rubs gallops   Gastrointestinal: Soft; NTND; bowel sounds normal and present  Extremities: WWP; no edema/cyanosis  Neurological: A&Ox3, CNII-XII grossly intact; no obvious focal deficits    MEDICATIONS  (STANDING):  albuterol/ipratropium for Nebulization 3 milliLiter(s) Nebulizer every 6 hours  amLODIPine   Tablet 5 milliGRAM(s) Oral every 24 hours  ARIPiprazole 20 milliGRAM(s) Oral daily  dextrose 40% Gel 15 Gram(s) Oral once  dextrose 5%. 1000 milliLiter(s) (50 mL/Hr) IV Continuous <Continuous>  dextrose 5%. 1000 milliLiter(s) (100 mL/Hr) IV Continuous <Continuous>  dextrose 50% Injectable 25 Gram(s) IV Push once  dextrose 50% Injectable 12.5 Gram(s) IV Push once  dextrose 50% Injectable 25 Gram(s) IV Push once  enoxaparin Injectable 40 milliGRAM(s) SubCutaneous every 12 hours  glucagon  Injectable 1 milliGRAM(s) IntraMuscular once  insulin lispro (ADMELOG) corrective regimen sliding scale   SubCutaneous Before meals and at bedtime  insulin lispro Injectable (ADMELOG) 4 Unit(s) SubCutaneous before lunch  insulin lispro Injectable (ADMELOG) 4 Unit(s) SubCutaneous before dinner  melatonin 5 milliGRAM(s) Oral at bedtime  pantoprazole    Tablet 40 milliGRAM(s) Oral before breakfast  simvastatin 10 milliGRAM(s) Oral at bedtime  tiotropium 18 MICROgram(s) Capsule 1 Capsule(s) Inhalation daily    MEDICATIONS  (PRN):  ALBUTerol    90 MICROgram(s) HFA Inhaler 2 Puff(s) Inhalation every 6 hours PRN SOB  simethicone 80 milliGRAM(s) Chew daily PRN Gas      No Known Allergies      LABS                        9.0    14.46 )-----------( 355      ( 25 Feb 2021 08:20 )             29.6     02-25    137  |  102  |  21  ----------------------------<  130<H>  4.3   |  27  |  0.61    Ca    8.8      25 Feb 2021 08:20  Phos  2.4     02-25  Mg     1.7     02-25    TPro  5.6<L>  /  Alb  2.5<L>  /  TBili  0.4  /  DBili  x   /  AST  15  /  ALT  11  /  AlkPhos  76  02-25                IMAGING/EKG/ETC   O/N Events: OSIRIS    Subjective/ROS: Patient seen and examined at bedside. Says that she believes her breathing is good today. Not having any cough. Slept better last night with melatonin. Discussed the plan to try to wean her oxygen today     Denies Fever/Chills, HA, CP, SOB, n/v, changes in bowel/urinary habits.  12pt ROS otherwise negative.    VITALS  Vital Signs Last 24 Hrs  T(C): 36.7 (26 Feb 2021 04:45), Max: 37 (25 Feb 2021 17:17)  T(F): 98.1 (26 Feb 2021 04:45), Max: 98.6 (25 Feb 2021 17:17)  HR: 68 (26 Feb 2021 05:36) (60 - 96)  BP: 145/59 (26 Feb 2021 05:36) (145/58 - 161/56)  BP(mean): 81 (26 Feb 2021 05:36) (81 - 83)  RR: 22 (26 Feb 2021 05:36) (18 - 24)  SpO2: 92% (26 Feb 2021 05:36) (90% - 94%)    CAPILLARY BLOOD GLUCOSE      POCT Blood Glucose.: 110 mg/dL (26 Feb 2021 06:48)  POCT Blood Glucose.: 268 mg/dL (25 Feb 2021 21:30)  POCT Blood Glucose.: 306 mg/dL (25 Feb 2021 16:23)  POCT Blood Glucose.: 341 mg/dL (25 Feb 2021 12:37)      PHYSICAL EXAM  General: NAD, lying in bed with eye mask on  Head: NC/AT  Neck: Supple; no JVD  Respiratory: CTAB; no wheezes/rales/rhonchi  Cardiovascular: Regular rhythm/rate; S1/S2+, no murmurs, rubs gallops   Gastrointestinal: Soft; NTND; bowel sounds normal and present  Extremities: WWP; no edema/cyanosis  Neurological: A&Ox3, CNII-XII grossly intact; no obvious focal deficits    MEDICATIONS  (STANDING):  albuterol/ipratropium for Nebulization 3 milliLiter(s) Nebulizer every 6 hours  amLODIPine   Tablet 5 milliGRAM(s) Oral every 24 hours  ARIPiprazole 20 milliGRAM(s) Oral daily  dextrose 40% Gel 15 Gram(s) Oral once  dextrose 5%. 1000 milliLiter(s) (50 mL/Hr) IV Continuous <Continuous>  dextrose 5%. 1000 milliLiter(s) (100 mL/Hr) IV Continuous <Continuous>  dextrose 50% Injectable 25 Gram(s) IV Push once  dextrose 50% Injectable 12.5 Gram(s) IV Push once  dextrose 50% Injectable 25 Gram(s) IV Push once  enoxaparin Injectable 40 milliGRAM(s) SubCutaneous every 12 hours  glucagon  Injectable 1 milliGRAM(s) IntraMuscular once  insulin lispro (ADMELOG) corrective regimen sliding scale   SubCutaneous Before meals and at bedtime  insulin lispro Injectable (ADMELOG) 4 Unit(s) SubCutaneous before lunch  insulin lispro Injectable (ADMELOG) 4 Unit(s) SubCutaneous before dinner  melatonin 5 milliGRAM(s) Oral at bedtime  pantoprazole    Tablet 40 milliGRAM(s) Oral before breakfast  simvastatin 10 milliGRAM(s) Oral at bedtime  tiotropium 18 MICROgram(s) Capsule 1 Capsule(s) Inhalation daily    MEDICATIONS  (PRN):  ALBUTerol    90 MICROgram(s) HFA Inhaler 2 Puff(s) Inhalation every 6 hours PRN SOB  simethicone 80 milliGRAM(s) Chew daily PRN Gas      No Known Allergies      LABS                        9.0    14.46 )-----------( 355      ( 25 Feb 2021 08:20 )             29.6     02-25    137  |  102  |  21  ----------------------------<  130<H>  4.3   |  27  |  0.61    Ca    8.8      25 Feb 2021 08:20  Phos  2.4     02-25  Mg     1.7     02-25    TPro  5.6<L>  /  Alb  2.5<L>  /  TBili  0.4  /  DBili  x   /  AST  15  /  ALT  11  /  AlkPhos  76  02-25                IMAGING/EKG/ETC

## 2021-02-26 NOTE — PROGRESS NOTE ADULT - ASSESSMENT
per Internal Medicine    69 yo F with past medical history of  Asthma (Dx after 9/11 attack) HTN, DM, HLD, L hip arthritis, Bipolar Disorder recently admitted for UTI with E. Coli bacteremia that presented from [facility] due to hypoxia, admitted to [facility] for management of COVID infection (+ 2/21), now stepped up to tele for HFNC.    Problem/Plan - 1:  ·  Problem: Acute respiratory failure with hypoxia.  Plan: #Acute Hypoxic Respiratory Failure   Due to COVID as per below. Lasix 40mg PO d/c'd during January hospital admission due to FACUNDO.   - Low suspicion for PE as 2/23 LE doppler negative for DVT, stable O2 requirements, low d-dimer,.  - consider restarting lasix 40mg PO if evidence of fluid overload    #Pneumonia 2/2 to COVID PNA.   - s/p Decadron 6mg for 10 day course until 2/25 (do 5 more days as pt has been on since 2/16)  - s/p Remdesevir  2/21- 2/25    #Metabolic alkalosis  Patient had been discharged in previous January adm on Na Bicarb in setting of HAGMa from renal failure. Renal function found to be recovered on this adm, with no AG. This metabolic alkalosis likely iatrogenic from the Na bicarb.   - hold na bicarb.     Problem/Plan - 2:  ·  Problem: COVID-19.  Plan: -Symptomatic starting around a week ago. Positive test 2/21  -Initially hypoxic on RA, s/p remdesivir and dexamethasone  - Currently on HFNC 40L 35%, will trial on NC if tolerating.     Problem/Plan - 3:  ·  Problem: Bipolar disorder.  Plan: -Continue home medication abilify    #B/l arm tremors  -Per patient, 2/2 to her abilify as a medication side effect, monitor.     Problem/Plan - 4:  ·  Problem: Asthma.  Plan: Pt previously not on home meds for the past 4-5 years as she said she didn't need it  - c/w albuterol 2 puffs q6 hrs  - c/w spiriva qd.     Problem/Plan - 5:  ·  Problem: Diabetes mellitus.  Plan: On Onglyza at home and Metformin A1c of 7.8  - mISS    #Reflux/Abdominal Gas  -Continue from rehab, prilosec 20 mg QD; continue probiotic; continue simethicone 80 mg qD PRN for gas.     Problem/Plan - 6:  Problem: Arthritis of left hip. Plan: Stable.    Problem/Plan - 7:  ·  Problem: HTN (hypertension).  Plan: Home med: lisinopril 40mg at home (though more for leg swelling), IN'ed on previous [facility] adm i/s/o low BP and FACUNDO  - Continue amlodipine 5mg    #Lower extremity edema.    Reports starting Lasix for LE edema. No Dx of CHF.  - Was held on previous admission and discharge as mentioned above    #HLD  -Continue simvastatin 10 mg QD home med.     Problem/Plan - 8:  ·  Problem: Anemia.  Plan: Baseline approx 9. No active signs of bleeding. Ferritin elevated, haptoglobin, transferritin decreased, retic hypoproliferative      #Elevated INR  INR 1.75 as on apixaban 2.5 mg BID since 2/16 for DVT ppx while at [facility] rehab.  - c/w lovenox dvt ppx.     Problem/Plan - 9:  ·  Problem: Sepsis.  Plan: RESOLVED  2/4 SIRS (RR and WBC) , CXR w/b/l infiltrate. Covid swab positive 2/21 Lactate < 2. Procal low, low suspicion for bacterial process.   - Blood cx NGTD  - Urine cx growing >100k CFU e. faecalis, however, patient denies polyuria/dyusira, cause of initial sepsis likely COVID as patient improving w/o abx therapy.     Problem/Plan - 10:  Problem: Nutrition, metabolism, and development symptoms. Plan; F: None  E: Replete PRN  N: DASH diet  GI: None  DVT: Lovenox 40 mg Q12 hrs.

## 2021-02-27 LAB
ALBUMIN SERPL ELPH-MCNC: 2.5 G/DL — LOW (ref 3.3–5)
ALP SERPL-CCNC: 95 U/L — SIGNIFICANT CHANGE UP (ref 40–120)
ALT FLD-CCNC: 8 U/L — LOW (ref 10–45)
ANION GAP SERPL CALC-SCNC: 9 MMOL/L — SIGNIFICANT CHANGE UP (ref 5–17)
AST SERPL-CCNC: 13 U/L — SIGNIFICANT CHANGE UP (ref 10–40)
BASOPHILS # BLD AUTO: 0.04 K/UL — SIGNIFICANT CHANGE UP (ref 0–0.2)
BASOPHILS NFR BLD AUTO: 0.2 % — SIGNIFICANT CHANGE UP (ref 0–2)
BILIRUB SERPL-MCNC: 0.5 MG/DL — SIGNIFICANT CHANGE UP (ref 0.2–1.2)
BUN SERPL-MCNC: 21 MG/DL — SIGNIFICANT CHANGE UP (ref 7–23)
CALCIUM SERPL-MCNC: 9 MG/DL — SIGNIFICANT CHANGE UP (ref 8.4–10.5)
CHLORIDE SERPL-SCNC: 102 MMOL/L — SIGNIFICANT CHANGE UP (ref 96–108)
CO2 SERPL-SCNC: 25 MMOL/L — SIGNIFICANT CHANGE UP (ref 22–31)
CREAT SERPL-MCNC: 0.73 MG/DL — SIGNIFICANT CHANGE UP (ref 0.5–1.3)
CRP SERPL-MCNC: 3.29 MG/DL — HIGH (ref 0–0.4)
D DIMER BLD IA.RAPID-MCNC: 284 NG/ML DDU — HIGH
EOSINOPHIL # BLD AUTO: 0.23 K/UL — SIGNIFICANT CHANGE UP (ref 0–0.5)
EOSINOPHIL NFR BLD AUTO: 1.3 % — SIGNIFICANT CHANGE UP (ref 0–6)
FERRITIN SERPL-MCNC: 356 NG/ML — HIGH (ref 15–150)
GLUCOSE BLDC GLUCOMTR-MCNC: 105 MG/DL — HIGH (ref 70–99)
GLUCOSE BLDC GLUCOMTR-MCNC: 155 MG/DL — HIGH (ref 70–99)
GLUCOSE BLDC GLUCOMTR-MCNC: 170 MG/DL — HIGH (ref 70–99)
GLUCOSE BLDC GLUCOMTR-MCNC: 228 MG/DL — HIGH (ref 70–99)
GLUCOSE SERPL-MCNC: 140 MG/DL — HIGH (ref 70–99)
HCT VFR BLD CALC: 29.5 % — LOW (ref 34.5–45)
HGB BLD-MCNC: 9.4 G/DL — LOW (ref 11.5–15.5)
IMM GRANULOCYTES NFR BLD AUTO: 5.3 % — HIGH (ref 0–1.5)
LYMPHOCYTES # BLD AUTO: 1.72 K/UL — SIGNIFICANT CHANGE UP (ref 1–3.3)
LYMPHOCYTES # BLD AUTO: 9.9 % — LOW (ref 13–44)
MAGNESIUM SERPL-MCNC: 2.1 MG/DL — SIGNIFICANT CHANGE UP (ref 1.6–2.6)
MCHC RBC-ENTMCNC: 28.1 PG — SIGNIFICANT CHANGE UP (ref 27–34)
MCHC RBC-ENTMCNC: 31.9 GM/DL — LOW (ref 32–36)
MCV RBC AUTO: 88.1 FL — SIGNIFICANT CHANGE UP (ref 80–100)
MONOCYTES # BLD AUTO: 1.27 K/UL — HIGH (ref 0–0.9)
MONOCYTES NFR BLD AUTO: 7.3 % — SIGNIFICANT CHANGE UP (ref 2–14)
NEUTROPHILS # BLD AUTO: 13.25 K/UL — HIGH (ref 1.8–7.4)
NEUTROPHILS NFR BLD AUTO: 76 % — SIGNIFICANT CHANGE UP (ref 43–77)
NRBC # BLD: 0 /100 WBCS — SIGNIFICANT CHANGE UP (ref 0–0)
PHOSPHATE SERPL-MCNC: 3.7 MG/DL — SIGNIFICANT CHANGE UP (ref 2.5–4.5)
PLATELET # BLD AUTO: 380 K/UL — SIGNIFICANT CHANGE UP (ref 150–400)
POTASSIUM SERPL-MCNC: 4.8 MMOL/L — SIGNIFICANT CHANGE UP (ref 3.5–5.3)
POTASSIUM SERPL-SCNC: 4.8 MMOL/L — SIGNIFICANT CHANGE UP (ref 3.5–5.3)
PROT SERPL-MCNC: 5.7 G/DL — LOW (ref 6–8.3)
RBC # BLD: 3.35 M/UL — LOW (ref 3.8–5.2)
RBC # FLD: 13.9 % — SIGNIFICANT CHANGE UP (ref 10.3–14.5)
SODIUM SERPL-SCNC: 136 MMOL/L — SIGNIFICANT CHANGE UP (ref 135–145)
WBC # BLD: 17.44 K/UL — HIGH (ref 3.8–10.5)
WBC # FLD AUTO: 17.44 K/UL — HIGH (ref 3.8–10.5)

## 2021-02-27 PROCEDURE — 99232 SBSQ HOSP IP/OBS MODERATE 35: CPT | Mod: GC

## 2021-02-27 PROCEDURE — 99233 SBSQ HOSP IP/OBS HIGH 50: CPT | Mod: GC

## 2021-02-27 RX ADMIN — Medication 4: at 11:55

## 2021-02-27 RX ADMIN — Medication 3 MILLILITER(S): at 11:56

## 2021-02-27 RX ADMIN — Medication 3 MILLILITER(S): at 18:37

## 2021-02-27 RX ADMIN — SIMVASTATIN 10 MILLIGRAM(S): 20 TABLET, FILM COATED ORAL at 23:03

## 2021-02-27 RX ADMIN — ENOXAPARIN SODIUM 40 MILLIGRAM(S): 100 INJECTION SUBCUTANEOUS at 05:07

## 2021-02-27 RX ADMIN — Medication 2: at 09:03

## 2021-02-27 RX ADMIN — Medication 3 MILLILITER(S): at 01:54

## 2021-02-27 RX ADMIN — Medication 3 MILLILITER(S): at 06:07

## 2021-02-27 RX ADMIN — AMLODIPINE BESYLATE 5 MILLIGRAM(S): 2.5 TABLET ORAL at 11:56

## 2021-02-27 RX ADMIN — ENOXAPARIN SODIUM 40 MILLIGRAM(S): 100 INJECTION SUBCUTANEOUS at 18:37

## 2021-02-27 RX ADMIN — Medication 5 MILLIGRAM(S): at 23:03

## 2021-02-27 RX ADMIN — TIOTROPIUM BROMIDE 1 CAPSULE(S): 18 CAPSULE ORAL; RESPIRATORY (INHALATION) at 11:56

## 2021-02-27 RX ADMIN — Medication 4 UNIT(S): at 18:38

## 2021-02-27 RX ADMIN — Medication 2: at 18:37

## 2021-02-27 RX ADMIN — Medication 4 UNIT(S): at 11:55

## 2021-02-27 RX ADMIN — Medication 3 MILLILITER(S): at 23:03

## 2021-02-27 RX ADMIN — PANTOPRAZOLE SODIUM 40 MILLIGRAM(S): 20 TABLET, DELAYED RELEASE ORAL at 05:08

## 2021-02-27 RX ADMIN — ARIPIPRAZOLE 20 MILLIGRAM(S): 15 TABLET ORAL at 11:56

## 2021-02-27 NOTE — PROGRESS NOTE ADULT - PROBLEM SELECTOR PLAN 2
-Symptomatic starting around a week ago. Positive test 2/21  -Initially hypoxic on RA, s/p remdesivir and dexamethasone  - Currently on 6L NC, OOBTC #COVID-19 Pneumonia  Patient first symptomatic one week before admission, first confirmed positive on admission 2/21  - was treated with 6 days of Levaquin and 8mg decadron at [facility] (2/16-2/21)  - rest of 10 day decadron course completed inpatient with 6mg daily until 2/25  - was treated with 5 days of Remdesivir (2/21-2/25)  - Currently on 6L NC  - trend inflammatory markers daily  - OOBTC  - incentive spirometer

## 2021-02-27 NOTE — PROGRESS NOTE ADULT - ASSESSMENT
71 yo F with past medical history of  Asthma (Dx after 9/11 attack) HTN, DM, HLD, L hip arthritis, Bipolar Disorder recently admitted for UTI with E. Coli bacteremia that presented from [facility] due to hypoxia, admitted to [facility] for management of COVID infection (+ 2/21), now stepped up to tele for HFNC.               69 yo F with past medical history of  Asthma (Dx after 9/11 attack) HTN, DM, HLD, L hip arthritis, Bipolar Disorder recently admitted for UTI with E. Coli bacteremia that presented from [facility] due to hypoxia, admitted to [facility] for management of COVID infection (+ 2/21), stepped up to tele for HFNC, weaned to 6L NC and stable for RMF

## 2021-02-27 NOTE — PROGRESS NOTE ADULT - SUBJECTIVE AND OBJECTIVE BOX
INCOMPLETE INCOMPLETE    Hospital Course:   71 yo F with past medical history of Asthma (Dx after 9/11 attack) HTN, DM, HLD, L hip arthritis, Bipolar Disorder recently admitted for UTI with E. Coli bacteremia (complicated by sepsis, with acute kidney injury, rhabdomyolysis, and transaminitis), dc'd to subacute rehab facility, now presenting from [facility] rehab 2/21 due to SOB, cough, and hypoxia due to COVID PNA. ICU consulted in ED,  approved for admission to [facility] on nasal canulla. Following day, 2/22, patient requiring NRB and stepped up to [facility] for further monitoring.  2/23 LE dopplers negative for DVT. Urine cx growing e. faecalis, however, pt asymptomatic, afebrile, abx not given. Patient oxygen weaned from 40/35 HFNC to 6L.    O/N Events: OSIRIS    Subjective/ROS: Patient seen and examined at bedside.     Denies Fever/Chills, HA, CP, SOB, n/v, changes in bowel/urinary habits.  12pt ROS otherwise negative.    VITALS  Vital Signs Last 24 Hrs  T(C): 37 (27 Feb 2021 04:54), Max: 37.2 (26 Feb 2021 22:29)  T(F): 98.6 (27 Feb 2021 04:54), Max: 99 (26 Feb 2021 22:29)  HR: 74 (27 Feb 2021 02:07) (61 - 78)  BP: 127/61 (26 Feb 2021 21:02) (127/61 - 156/76)  BP(mean): 76 (26 Feb 2021 21:02) (76 - 81)  RR: 16 (27 Feb 2021 02:07) (16 - 283)  SpO2: 90% (27 Feb 2021 02:07) (90% - 96%)    CAPILLARY BLOOD GLUCOSE      POCT Blood Glucose.: 171 mg/dL (26 Feb 2021 21:46)  POCT Blood Glucose.: 161 mg/dL (26 Feb 2021 16:24)  POCT Blood Glucose.: 204 mg/dL (26 Feb 2021 11:50)  POCT Blood Glucose.: 110 mg/dL (26 Feb 2021 06:48)      PHYSICAL EXAM  General: NAD  Head: NC/AT; MMM; PERRL; EOMI;  Neck: Supple; no JVD  Respiratory: CTAB; no wheezes/rales/rhonchi  Cardiovascular: Regular rhythm/rate; S1/S2+, no murmurs, rubs gallops   Gastrointestinal: Soft; NTND; bowel sounds normal and present  Extremities: WWP; no edema/cyanosis  Neurological: A&Ox3, CNII-XII grossly intact; no obvious focal deficits    MEDICATIONS  (STANDING):  albuterol/ipratropium for Nebulization 3 milliLiter(s) Nebulizer every 6 hours  amLODIPine   Tablet 5 milliGRAM(s) Oral every 24 hours  ARIPiprazole 20 milliGRAM(s) Oral daily  dextrose 40% Gel 15 Gram(s) Oral once  dextrose 5%. 1000 milliLiter(s) (50 mL/Hr) IV Continuous <Continuous>  dextrose 5%. 1000 milliLiter(s) (100 mL/Hr) IV Continuous <Continuous>  dextrose 50% Injectable 12.5 Gram(s) IV Push once  dextrose 50% Injectable 25 Gram(s) IV Push once  dextrose 50% Injectable 25 Gram(s) IV Push once  enoxaparin Injectable 40 milliGRAM(s) SubCutaneous every 12 hours  glucagon  Injectable 1 milliGRAM(s) IntraMuscular once  insulin lispro (ADMELOG) corrective regimen sliding scale   SubCutaneous Before meals and at bedtime  insulin lispro Injectable (ADMELOG) 4 Unit(s) SubCutaneous before lunch  insulin lispro Injectable (ADMELOG) 4 Unit(s) SubCutaneous before dinner  melatonin 5 milliGRAM(s) Oral at bedtime  pantoprazole    Tablet 40 milliGRAM(s) Oral before breakfast  simvastatin 10 milliGRAM(s) Oral at bedtime  tiotropium 18 MICROgram(s) Capsule 1 Capsule(s) Inhalation daily    MEDICATIONS  (PRN):  ALBUTerol    90 MICROgram(s) HFA Inhaler 2 Puff(s) Inhalation every 6 hours PRN SOB  simethicone 80 milliGRAM(s) Chew daily PRN Gas      No Known Allergies      LABS                        9.1    16.25 )-----------( 363      ( 26 Feb 2021 07:53 )             28.8     02-26    135  |  102  |  19  ----------------------------<  109<H>  4.6   |  26  |  0.58    Ca    9.0      26 Feb 2021 07:53  Phos  2.4     02-26  Mg     1.5     02-26    TPro  5.6<L>  /  Alb  2.4<L>  /  TBili  0.5  /  DBili  x   /  AST  12  /  ALT  9<L>  /  AlkPhos  75  02-26                IMAGING/EKG/ETC   INCOMPLETE    Hospital Course:   69 yo F with past medical history of Asthma (Dx after 9/11 attack) HTN, DM, HLD, L hip arthritis, Bipolar Disorder recently admitted for UTI with E. Coli bacteremia (complicated by sepsis, with acute kidney injury, rhabdomyolysis, and transaminitis), dc'd to subacute rehab facility, now presenting from [facility] rehab 2/21 due to SOB, cough, and hypoxia due to COVID PNA. ICU consulted in ED,  approved for admission to [facility] on nasal canulla. Following day, 2/22, patient requiring NRB and stepped up to [facility] for further monitoring.  2/23 LE dopplers negative for DVT. Urine cx growing e. faecalis, however, pt asymptomatic, afebrile, abx not given. Patient oxygen weaned from 40/35 HFNC to 6L.    O/N Events: OSIRIS    Subjective/ROS: Patient seen and examined at bedside. Says that she is not having difficulty breathing. Slept well last night. No complaints. NC was off before patient was interviewed and patient was satting 88-89%, placed back up to 92% during interview.     Denies Fever/Chills, HA, CP, SOB, n/v, changes in bowel/urinary habits.  12pt ROS otherwise negative.    VITALS  Vital Signs Last 24 Hrs  T(C): 37 (27 Feb 2021 04:54), Max: 37.2 (26 Feb 2021 22:29)  T(F): 98.6 (27 Feb 2021 04:54), Max: 99 (26 Feb 2021 22:29)  HR: 74 (27 Feb 2021 02:07) (61 - 78)  BP: 127/61 (26 Feb 2021 21:02) (127/61 - 156/76)  BP(mean): 76 (26 Feb 2021 21:02) (76 - 81)  RR: 16 (27 Feb 2021 02:07) (16 - 283)  SpO2: 90% (27 Feb 2021 02:07) (90% - 96%)    CAPILLARY BLOOD GLUCOSE      POCT Blood Glucose.: 171 mg/dL (26 Feb 2021 21:46)  POCT Blood Glucose.: 161 mg/dL (26 Feb 2021 16:24)  POCT Blood Glucose.: 204 mg/dL (26 Feb 2021 11:50)  POCT Blood Glucose.: 110 mg/dL (26 Feb 2021 06:48)      PHYSICAL EXAM  General: NAD  Head: NC/AT; MMM; PERRL; EOMI;  Neck: Supple; no JVD  Respiratory: CTAB; no wheezes/rales/rhonchi  Cardiovascular: Regular rhythm/rate; S1/S2+, no murmurs, rubs gallops   Gastrointestinal: Soft; NTND; bowel sounds normal and present  Extremities: WWP; no edema/cyanosis  Neurological: A&Ox3, CNII-XII grossly intact; no obvious focal deficits    MEDICATIONS  (STANDING):  albuterol/ipratropium for Nebulization 3 milliLiter(s) Nebulizer every 6 hours  amLODIPine   Tablet 5 milliGRAM(s) Oral every 24 hours  ARIPiprazole 20 milliGRAM(s) Oral daily  dextrose 40% Gel 15 Gram(s) Oral once  dextrose 5%. 1000 milliLiter(s) (50 mL/Hr) IV Continuous <Continuous>  dextrose 5%. 1000 milliLiter(s) (100 mL/Hr) IV Continuous <Continuous>  dextrose 50% Injectable 12.5 Gram(s) IV Push once  dextrose 50% Injectable 25 Gram(s) IV Push once  dextrose 50% Injectable 25 Gram(s) IV Push once  enoxaparin Injectable 40 milliGRAM(s) SubCutaneous every 12 hours  glucagon  Injectable 1 milliGRAM(s) IntraMuscular once  insulin lispro (ADMELOG) corrective regimen sliding scale   SubCutaneous Before meals and at bedtime  insulin lispro Injectable (ADMELOG) 4 Unit(s) SubCutaneous before lunch  insulin lispro Injectable (ADMELOG) 4 Unit(s) SubCutaneous before dinner  melatonin 5 milliGRAM(s) Oral at bedtime  pantoprazole    Tablet 40 milliGRAM(s) Oral before breakfast  simvastatin 10 milliGRAM(s) Oral at bedtime  tiotropium 18 MICROgram(s) Capsule 1 Capsule(s) Inhalation daily    MEDICATIONS  (PRN):  ALBUTerol    90 MICROgram(s) HFA Inhaler 2 Puff(s) Inhalation every 6 hours PRN SOB  simethicone 80 milliGRAM(s) Chew daily PRN Gas      No Known Allergies      LABS                        9.1    16.25 )-----------( 363      ( 26 Feb 2021 07:53 )             28.8     02-26    135  |  102  |  19  ----------------------------<  109<H>  4.6   |  26  |  0.58    Ca    9.0      26 Feb 2021 07:53  Phos  2.4     02-26  Mg     1.5     02-26    TPro  5.6<L>  /  Alb  2.4<L>  /  TBili  0.5  /  DBili  x   /  AST  12  /  ALT  9<L>  /  AlkPhos  75  02-26                IMAGING/EKG/ETC   TRANSFER NOTE 2W -> [facility]    Hospital Course:   69 yo F with past medical history of Asthma (Dx after 9/11 attack) HTN, DM, HLD, L hip arthritis, Bipolar Disorder recently admitted for UTI with E. Coli bacteremia (complicated by sepsis, with acute kidney injury, rhabdomyolysis, and transaminitis), dc'd to subacute rehab facility, now presenting from [facility] rehab 2/21 due to SOB, cough, and hypoxia due to COVID PNA. ICU consulted in ED,  approved for admission to [facility] on nasal canulla. Following day, 2/22, patient requiring NRB and stepped up to [facility] for further monitoring.  2/23 LE dopplers negative for DVT. Urine cx growing e. faecalis, however, pt asymptomatic, afebrile, abx not given. Patient oxygen weaned from 40/35 HFNC to 6L on 2/27. Stable on 6L NC for stepdown to [facility].     O/N Events: OSIRIS    Subjective/ROS: Patient seen and examined at bedside. Says that she is not having difficulty breathing. Slept well last night. No complaints. NC was off before patient was interviewed and patient was satting 88-89%, placed back up to 92% during interview.     Denies Fever/Chills, HA, CP, SOB, n/v, changes in bowel/urinary habits.  12pt ROS otherwise negative.    VITALS  Vital Signs Last 24 Hrs  T(C): 37 (27 Feb 2021 04:54), Max: 37.2 (26 Feb 2021 22:29)  T(F): 98.6 (27 Feb 2021 04:54), Max: 99 (26 Feb 2021 22:29)  HR: 74 (27 Feb 2021 02:07) (61 - 78)  BP: 127/61 (26 Feb 2021 21:02) (127/61 - 156/76)  BP(mean): 76 (26 Feb 2021 21:02) (76 - 81)  RR: 16 (27 Feb 2021 02:07) (16 - 283)  SpO2: 90% (27 Feb 2021 02:07) (90% - 96%)    CAPILLARY BLOOD GLUCOSE      POCT Blood Glucose.: 171 mg/dL (26 Feb 2021 21:46)  POCT Blood Glucose.: 161 mg/dL (26 Feb 2021 16:24)  POCT Blood Glucose.: 204 mg/dL (26 Feb 2021 11:50)  POCT Blood Glucose.: 110 mg/dL (26 Feb 2021 06:48)      PHYSICAL EXAM  General: NAD  Head: NC/AT; MMM; PERRL; EOMI;  Neck: Supple; no JVD  Respiratory: CTAB; no wheezes/rales/rhonchi  Cardiovascular: Regular rhythm/rate; S1/S2+, no murmurs, rubs gallops   Gastrointestinal: Soft; NTND; bowel sounds normal and present  Extremities: WWP; no edema/cyanosis  Neurological: A&Ox3, CNII-XII grossly intact; no obvious focal deficits    MEDICATIONS  (STANDING):  albuterol/ipratropium for Nebulization 3 milliLiter(s) Nebulizer every 6 hours  amLODIPine   Tablet 5 milliGRAM(s) Oral every 24 hours  ARIPiprazole 20 milliGRAM(s) Oral daily  dextrose 40% Gel 15 Gram(s) Oral once  dextrose 5%. 1000 milliLiter(s) (50 mL/Hr) IV Continuous <Continuous>  dextrose 5%. 1000 milliLiter(s) (100 mL/Hr) IV Continuous <Continuous>  dextrose 50% Injectable 12.5 Gram(s) IV Push once  dextrose 50% Injectable 25 Gram(s) IV Push once  dextrose 50% Injectable 25 Gram(s) IV Push once  enoxaparin Injectable 40 milliGRAM(s) SubCutaneous every 12 hours  glucagon  Injectable 1 milliGRAM(s) IntraMuscular once  insulin lispro (ADMELOG) corrective regimen sliding scale   SubCutaneous Before meals and at bedtime  insulin lispro Injectable (ADMELOG) 4 Unit(s) SubCutaneous before lunch  insulin lispro Injectable (ADMELOG) 4 Unit(s) SubCutaneous before dinner  melatonin 5 milliGRAM(s) Oral at bedtime  pantoprazole    Tablet 40 milliGRAM(s) Oral before breakfast  simvastatin 10 milliGRAM(s) Oral at bedtime  tiotropium 18 MICROgram(s) Capsule 1 Capsule(s) Inhalation daily    MEDICATIONS  (PRN):  ALBUTerol    90 MICROgram(s) HFA Inhaler 2 Puff(s) Inhalation every 6 hours PRN SOB  simethicone 80 milliGRAM(s) Chew daily PRN Gas      No Known Allergies    LABS                        9.4    17.44 )-----------( 380      ( 27 Feb 2021 07:43 )             29.5     02-27    136  |  102  |  21  ----------------------------<  140<H>  4.8   |  25  |  0.73    Ca    9.0      27 Feb 2021 07:43  Phos  3.7     02-27  Mg     2.1     02-27    TPro  5.7<L>  /  Alb  2.5<L>  /  TBili  0.5  /  DBili  x   /  AST  13  /  ALT  8<L>  /  AlkPhos  95  02-27               TRANSFER NOTE 2W -> [facility]    Hospital Course:   69 yo F with past medical history of Asthma (Dx after 9/11 attack) HTN, DM, HLD, L hip arthritis, Bipolar Disorder recently admitted for UTI with E. Coli bacteremia (complicated by sepsis, with acute kidney injury, rhabdomyolysis, and transaminitis), dc'd to subacute rehab facility, now presenting from [facility] rehab 2/21 due to SOB, cough, and hypoxia due to COVID PNA. ICU consulted in ED,  approved for admission to [facility] on nasal canulla. Following day, 2/22, patient requiring NRB and stepped up to [facility] for further monitoring.  2/23 LE dopplers negative for DVT. Urine cx growing e. faecalis, however, pt asymptomatic, afebrile, abx not given. Patient oxygen weaned from 40/35 HFNC to 6L on 2/27. Stable on 6L NC for stepdown to [facility].     O/N Events: OSIRIS    Subjective/ROS: Patient seen and examined at bedside. Says that she is not having difficulty breathing. Slept well last night. No complaints. NC was off before patient was interviewed and patient was satting 88-89%, placed back up to 92% during interview.     Denies Fever/Chills, HA, CP, SOB, n/v, changes in bowel/urinary habits.  12pt ROS otherwise negative.    VITALS  Vital Signs Last 24 Hrs  T(C): 37 (27 Feb 2021 04:54), Max: 37.2 (26 Feb 2021 22:29)  T(F): 98.6 (27 Feb 2021 04:54), Max: 99 (26 Feb 2021 22:29)  HR: 74 (27 Feb 2021 02:07) (61 - 78)  BP: 127/61 (26 Feb 2021 21:02) (127/61 - 156/76)  BP(mean): 76 (26 Feb 2021 21:02) (76 - 81)  RR: 16 (27 Feb 2021 02:07) (16 - 283)  SpO2: 90% (27 Feb 2021 02:07) (90% - 96%)    CAPILLARY BLOOD GLUCOSE      POCT Blood Glucose.: 171 mg/dL (26 Feb 2021 21:46)  POCT Blood Glucose.: 161 mg/dL (26 Feb 2021 16:24)  POCT Blood Glucose.: 204 mg/dL (26 Feb 2021 11:50)  POCT Blood Glucose.: 110 mg/dL (26 Feb 2021 06:48)      PHYSICAL EXAM  General: NAD  Head: NC/AT; MMM; PERRL; EOMI;  Neck: Supple; no JVD  Respiratory: CTAB; no wheezes/rales/rhonchi  Cardiovascular: Regular rhythm/rate; S1/S2+, no murmurs, rubs gallops   Gastrointestinal: Soft; NTND; bowel sounds normal and present  Extremities: WWP; no edema/cyanosis  Neurological: A&Ox3, CNII-XII grossly intact; no obvious focal deficits    MEDICATIONS  (STANDING):  albuterol/ipratropium for Nebulization 3 milliLiter(s) Nebulizer every 6 hours  amLODIPine   Tablet 5 milliGRAM(s) Oral every 24 hours  ARIPiprazole 20 milliGRAM(s) Oral daily  dextrose 40% Gel 15 Gram(s) Oral once  dextrose 5%. 1000 milliLiter(s) (50 mL/Hr) IV Continuous <Continuous>  dextrose 5%. 1000 milliLiter(s) (100 mL/Hr) IV Continuous <Continuous>  dextrose 50% Injectable 12.5 Gram(s) IV Push once  dextrose 50% Injectable 25 Gram(s) IV Push once  dextrose 50% Injectable 25 Gram(s) IV Push once  enoxaparin Injectable 40 milliGRAM(s) SubCutaneous every 12 hours  glucagon  Injectable 1 milliGRAM(s) IntraMuscular once  insulin lispro (ADMELOG) corrective regimen sliding scale   SubCutaneous Before meals and at bedtime  insulin lispro Injectable (ADMELOG) 4 Unit(s) SubCutaneous before lunch  insulin lispro Injectable (ADMELOG) 4 Unit(s) SubCutaneous before dinner  melatonin 5 milliGRAM(s) Oral at bedtime  pantoprazole    Tablet 40 milliGRAM(s) Oral before breakfast  simvastatin 10 milliGRAM(s) Oral at bedtime  tiotropium 18 MICROgram(s) Capsule 1 Capsule(s) Inhalation daily    MEDICATIONS  (PRN):  ALBUTerol    90 MICROgram(s) HFA Inhaler 2 Puff(s) Inhalation every 6 hours PRN SOB  simethicone 80 milliGRAM(s) Chew daily PRN Gas      No Known Allergies    LABS                        9.4    17.44 )-----------( 380      ( 27 Feb 2021 07:43 )             29.5     02-27    136  |  102  |  21  ----------------------------<  140<H>  4.8   |  25  |  0.73    Ca    9.0      27 Feb 2021 07:43  Phos  3.7     02-27  Mg     2.1     02-27    TPro  5.7<L>  /  Alb  2.5<L>  /  TBili  0.5  /  DBili  x   /  AST  13  /  ALT  8<L>  /  AlkPhos  95  02-27               TRANSFER NOTE 2W -> [facility]    Hospital Course:   71 yo F with past medical history of Asthma (Dx after 9/11 attack) HTN, DM, HLD, L hip arthritis, Bipolar Disorder recently admitted for UTI with E. Coli bacteremia (complicated by sepsis, with acute kidney injury, rhabdomyolysis, and transaminitis), dc'd to subacute rehab facility, now presenting from [facility] rehab 2/21 due to SOB, cough, and hypoxia due to COVID PNA. ICU consulted in ED,  approved for admission to [facility] on nasal canulla. Following day, 2/22, patient requiring NRB and stepped up to [facility] for further monitoring.  2/23 LE dopplers negative for DVT. Urine cx growing e. faecalis, however, pt asymptomatic, afebrile, abx not given. Patient oxygen weaned from 40/35 HFNC to 6L on 2/27. Stable on 6L NC for stepdown to [facility].     O/N Events: OSIRIS    Subjective/ROS: Patient seen and examined at bedside. Says that she is not having difficulty breathing. Slept well last night. No complaints. NC was off before patient was interviewed and patient was satting 88-89%, placed back up to 92% during interview.     Denies Fever/Chills, HA, CP, SOB, n/v, changes in bowel/urinary habits.  12pt ROS otherwise negative.    VITALS  Vital Signs Last 24 Hrs  T(C): 37 (27 Feb 2021 04:54), Max: 37.2 (26 Feb 2021 22:29)  T(F): 98.6 (27 Feb 2021 04:54), Max: 99 (26 Feb 2021 22:29)  HR: 74 (27 Feb 2021 02:07) (61 - 78)  BP: 127/61 (26 Feb 2021 21:02) (127/61 - 156/76)  BP(mean): 76 (26 Feb 2021 21:02) (76 - 81)  RR: 16 (27 Feb 2021 02:07) (16 - 283)  SpO2: 90% (27 Feb 2021 02:07) (90% - 96%)    CAPILLARY BLOOD GLUCOSE      POCT Blood Glucose.: 171 mg/dL (26 Feb 2021 21:46)  POCT Blood Glucose.: 161 mg/dL (26 Feb 2021 16:24)  POCT Blood Glucose.: 204 mg/dL (26 Feb 2021 11:50)  POCT Blood Glucose.: 110 mg/dL (26 Feb 2021 06:48)      PHYSICAL EXAM  General: NAD  Head: NC/AT; MMM; PERRL; EOMI;  Neck: Supple; no JVD  Respiratory: CTAB; no wheezes/rales/rhonchi  Cardiovascular: Regular rhythm/rate; S1/S2+, no murmurs, rubs gallops   Gastrointestinal: Soft; NTND; bowel sounds normal and present  Extremities: WWP; no edema/cyanosis  Neurological: A&Ox3, CNII-XII grossly intact; baseline tremor R>>L from home medication    MEDICATIONS  (STANDING):  albuterol/ipratropium for Nebulization 3 milliLiter(s) Nebulizer every 6 hours  amLODIPine   Tablet 5 milliGRAM(s) Oral every 24 hours  ARIPiprazole 20 milliGRAM(s) Oral daily  dextrose 40% Gel 15 Gram(s) Oral once  dextrose 5%. 1000 milliLiter(s) (50 mL/Hr) IV Continuous <Continuous>  dextrose 5%. 1000 milliLiter(s) (100 mL/Hr) IV Continuous <Continuous>  dextrose 50% Injectable 12.5 Gram(s) IV Push once  dextrose 50% Injectable 25 Gram(s) IV Push once  dextrose 50% Injectable 25 Gram(s) IV Push once  enoxaparin Injectable 40 milliGRAM(s) SubCutaneous every 12 hours  glucagon  Injectable 1 milliGRAM(s) IntraMuscular once  insulin lispro (ADMELOG) corrective regimen sliding scale   SubCutaneous Before meals and at bedtime  insulin lispro Injectable (ADMELOG) 4 Unit(s) SubCutaneous before lunch  insulin lispro Injectable (ADMELOG) 4 Unit(s) SubCutaneous before dinner  melatonin 5 milliGRAM(s) Oral at bedtime  pantoprazole    Tablet 40 milliGRAM(s) Oral before breakfast  simvastatin 10 milliGRAM(s) Oral at bedtime  tiotropium 18 MICROgram(s) Capsule 1 Capsule(s) Inhalation daily    MEDICATIONS  (PRN):  ALBUTerol    90 MICROgram(s) HFA Inhaler 2 Puff(s) Inhalation every 6 hours PRN SOB  simethicone 80 milliGRAM(s) Chew daily PRN Gas      No Known Allergies    LABS                        9.4    17.44 )-----------( 380      ( 27 Feb 2021 07:43 )             29.5     02-27    136  |  102  |  21  ----------------------------<  140<H>  4.8   |  25  |  0.73    Ca    9.0      27 Feb 2021 07:43  Phos  3.7     02-27  Mg     2.1     02-27    TPro  5.7<L>  /  Alb  2.5<L>  /  TBili  0.5  /  DBili  x   /  AST  13  /  ALT  8<L>  /  AlkPhos  95  02-27

## 2021-02-27 NOTE — PROGRESS NOTE ADULT - ASSESSMENT
70F with PMH of Asthma (diagnosed after 9/11/01 attack), HTN, DM, HLD, L hip arthritis, Bipolar Disorder, and recent admission for UTI with E. Coli bacteremia, presented from [facility] due to hypoxia, and was admitted to [facility] for management of COVID infection (+2/21). Patient was transferred to telemetry for HFNC and supplemental oxygen is now decreased to 6L NC. Patient deemed to be stable for transfer to [facility].

## 2021-02-27 NOTE — PROGRESS NOTE ADULT - PROBLEM SELECTOR PLAN 5
On Onglyza at home and Metformin A1c of 7.8  - mISS    #Reflux/Abdominal Gas  -Continue from rehab, prilosec 20 mg QD; continue probiotic; continue simethicone 80 mg qD PRN for gas Baseline hemoglobin approximately 9. No active signs of bleeding. Ferritin elevated; hemoglobin and transferrin decreased; reticulocytes hypoproliferative      #Elevated INR  INR 1.75  - likely due to apixaban 2.5 mg BID since 2/16 for DVT prophylaxis while at [facility] rehab.  - continue Lovenox for DVT prophylaxis, 40mg BID as BMI >35

## 2021-02-27 NOTE — PROGRESS NOTE ADULT - PROBLEM SELECTOR PLAN 7
Home med: lisinopril 40mg at home (though more for leg swelling), LA'ed on previous January hospital admission i/s/o low BP and FACUNDO  - Continue amlodipine 5mg    #Lower extremity edema.    Reports starting Lasix for LE edema. No Dx of CHF.  - Was held on previous admission and discharge as mentioned above    #HLD  -Continue simvastatin 10 mg QD home med Patient takes 20mg Abilify daily.  - Continue home medication, Abilify 20mg    #Bilateral arm tremors  - as per the patient, the tremors are a side effect of the Abilify  - continue to monitor

## 2021-02-27 NOTE — PROGRESS NOTE ADULT - PROBLEM SELECTOR PLAN 1
#Acute Hypoxic Respiratory Failure   Due to COVID as per below. Lasix 40mg PO d/c'd during January hospital admission due to FACUNDO.   - Low suspicion for PE as 2/23 LE doppler negative for DVT, stable O2 requirements, low d-dimer,.  - consider restarting Lasix 40mg PO if evidence of fluid overload    #Pneumonia 2/2 to COVID PNA.   - s/p Decadron 6mg for 10 day course until 2/25 (do 5 more days as pt has been on since 2/16)  - s/p Remdesevir  2/21- 2/25    #Metabolic alkalosis  Patient had been discharged in previous January adm on Na Bicarb in setting of HAGMa from renal failure. Renal function found to be recovered on this adm, with no AG. This metabolic alkalosis likely iatrogenic from the Na bicarb.   - hold na bicarb #Acute Hypoxic Respiratory Failure   Due COVID-19 as detailed below.  - Low suspicion for PE as 2/23 LE doppler negative for DVT, low D-dimer and stable supplemental oxygen requirements.  - consider starting Lasix 40mg PO if evidence of fluid overload  - COVID-19 management detailed below    #Metabolic alkalosis  RESOLVED  Patient had been discharged on sodium bicarbonate in January 2021 in setting of HAGMA from renal failure. Renal function has recovered with no AG.  - Patient had temporary dialysis on last admission  - Metabolic alkalosis likely iatrogenic from sodium bicarbonate.   - Discontinued sodium bicarbonate

## 2021-02-27 NOTE — PROGRESS NOTE ADULT - PROBLEM SELECTOR PLAN 10
F: None  E: Replete PRN  N: DASH diet  GI: None  DVT: Lovenox 40 mg Q12 hrs F: None  E: Replete PRN  N: DASH diet  GI: None  DVT: Lovenox 40 mg Q12 hrs  Dispo: 2W -> COVID [facility]

## 2021-02-27 NOTE — PROGRESS NOTE ADULT - PROBLEM SELECTOR PLAN 9
RESOLVED  2/4 SIRS (RR and WBC) , CXR w/b/l infiltrate. Covid swab positive 2/21 Lactate < 2. Procal low, low suspicion for bacterial process.   - Blood cx NGTD  - Urine cx growing >100k CFU e. faecalis, however, patient denies polyuria/dyusira, cause of initial sepsis likely COVID as patient improving w/o abx therapy. Stable

## 2021-02-27 NOTE — PROGRESS NOTE ADULT - PROBLEM SELECTOR PLAN 2
-Symptomatic starting around a week ago. Positive test 2/21  -Initially hypoxic on RA, s/p remdesivir and dexamethasone  - Currently on 6L NC, OOBTC -Symptomatic starting around a week before admission. Positive test 2/21  -Initially hypoxic on RA, s/p remdesivir and dexamethasone  - Currently on 6L NC, OOBTC

## 2021-02-27 NOTE — PROGRESS NOTE ADULT - PROBLEM SELECTOR PLAN 4
Pt previously not on home meds for the past 4-5 years as she said she didn't need it  - c/w albuterol 2 puffs q6 hrs  - c/w spiriva qd Patient was prescribed lisinopril 40mg in the past and it was stopped on last admission January 2021 due to hypotension and FACUNDO secondary to sepsis.  - continue amlodipine 5mg  - blood pressure currently well controlled    #Lower extremity edema  Patient was prescribed Lasix for LE edema in the past, but it was stopped on last admission for hypotension and FACUNDO in setting of sepsis. Patient has not been diagnosed with CHF.    #HLD  - Continue simvastatin 10 mg QD home med

## 2021-02-27 NOTE — PROGRESS NOTE ADULT - PROBLEM SELECTOR PLAN 8
Baseline approx 9. No active signs of bleeding. Ferritin elevated, haptoglobin, transferritin decreased, retic hypoproliferative      #Elevated INR  INR 1.75 as on apixaban 2.5 mg BID since 2/16 for DVT ppx while at [facility] rehab.  - c/w Lovenox dvt ppx RESOLVED  2/4 SIRS (RR and WBC), CXR w/b/l infiltrate. COVID-19 PCR positive 2/21. Lactate < 2. Procalcitonin normal, low suspicion for bacterial process.   - Blood cultures with no growth, finalized  - Urine culture grew >100,000 CFU of E. faecium, however, patient denies polyuria/dysuria. Cause of initial sepsis likely COVID-19 as patient is improving without antibiotic therapy.

## 2021-02-27 NOTE — PROGRESS NOTE ADULT - PROBLEM SELECTOR PLAN 6
Stable Patient was on Onglyza and Metformin in the past. A1c of 7.8 on 1/25/21. Patient was discharged to [facility] rehab on mISS  - continue mISS    #Reflux/Abdominal Gas  -Continue from rehab, prilosec 20 mg QD; continue probiotic; continue simethicone 80 mg qD PRN for gas

## 2021-02-27 NOTE — PROGRESS NOTE ADULT - PROBLEM SELECTOR PLAN 10
F: None indicated  E: Replete when K<4 or Mg<2  N: DASH/TLC diet  GI prophylaxis: None  DVT: Lovenox 40 mg Q12 hrs

## 2021-02-27 NOTE — PROGRESS NOTE ADULT - SUBJECTIVE AND OBJECTIVE BOX
TRANSFER ACCEPTANCE NOTE 2W TELEMETRY -> 4U [facility]    Hospital Course:   70F with PMH of Asthma (diagnosed after 9/11/01 attack), HTN, DM, HLD, L hip arthritis, and Bipolar Disorder was recently admitted for UTI with E. Coli bacteremia (complicated by sepsis, with acute kidney injury, rhabdomyolysis, and transaminitis) and discharged to subacute rehab facility. Patient presented from [facility] rehab 2/21/21 due to SOB, cough, and hypoxia from COVID PNA. ICU was consulted in the ED, and patient was deemed stable for admission to [facility] on nasal canula. On 2/22 the patient required NRB and was stepped up to COVID telemetry for further monitoring. LE dopplers were negative for DVT on 2/23. Urine culture grew E. faecalis, however antibiotics were note administered as the patient is asymptomatic and afebrile. Patient's supplemental oxygen was decreased from 40/35 HFNC to 6L NC on 2/26. Patient was observed on 6L NC for approximately 24 hours and is now stable on 6L NC for stepdown to [facility].    SUBJECTIVE: Patient seen and examined at bedside. She states that her breathing is improved and that she slept well last night. She currently denies any acute complaints. She denies shortness of breath, fevers, chills, headache, chest pain, nausea, vomiting, dysuria, changes in bowel movements.  12pt ROS otherwise negative.    VITALS  Vital Signs Last 24 Hrs  T(C): 36.7 (27 Feb 2021 10:41), Max: 37.2 (26 Feb 2021 22:29)  T(F): 98 (27 Feb 2021 10:41), Max: 99 (26 Feb 2021 22:29)  HR: 80 (27 Feb 2021 10:41) (61 - 109)  BP: 116/50 (27 Feb 2021 10:41) (116/50 - 156/76)  BP(mean): 73 (27 Feb 2021 00:01) (73 - 76)  RR: 26 (27 Feb 2021 10:41) (16 - 283)  SpO2: 95% (27 Feb 2021 10:41) (89% - 96%)    CAPILLARY BLOOD GLUCOSE      POCT Blood Glucose.: 171 mg/dL (26 Feb 2021 21:46)  POCT Blood Glucose.: 161 mg/dL (26 Feb 2021 16:24)  POCT Blood Glucose.: 204 mg/dL (26 Feb 2021 11:50)  POCT Blood Glucose.: 110 mg/dL (26 Feb 2021 06:48)      PHYSICAL EXAM  General: resting comfortably in bed; NAD  HEENT: PERRL, EOMI; MMM  Neck: Supple; no JVD  Respiratory: CTAB; no W/R/R  Cardiovascular: RRR; +S1/S2; no M/R/G   Gastrointestinal: Soft; NT/ND  Extremities: WWP; no edema/cyanosis  Neurological: A&Ox3, CNII-XII grossly intact; no obvious focal deficits    MEDICATIONS  (STANDING):  albuterol/ipratropium for Nebulization 3 milliLiter(s) Nebulizer every 6 hours  amLODIPine   Tablet 5 milliGRAM(s) Oral every 24 hours  ARIPiprazole 20 milliGRAM(s) Oral daily  dextrose 40% Gel 15 Gram(s) Oral once  dextrose 5%. 1000 milliLiter(s) (50 mL/Hr) IV Continuous <Continuous>  dextrose 5%. 1000 milliLiter(s) (100 mL/Hr) IV Continuous <Continuous>  dextrose 50% Injectable 25 Gram(s) IV Push once  dextrose 50% Injectable 12.5 Gram(s) IV Push once  dextrose 50% Injectable 25 Gram(s) IV Push once  enoxaparin Injectable 40 milliGRAM(s) SubCutaneous every 12 hours  glucagon  Injectable 1 milliGRAM(s) IntraMuscular once  insulin lispro (ADMELOG) corrective regimen sliding scale   SubCutaneous Before meals and at bedtime  insulin lispro Injectable (ADMELOG) 4 Unit(s) SubCutaneous before lunch  insulin lispro Injectable (ADMELOG) 4 Unit(s) SubCutaneous before dinner  melatonin 5 milliGRAM(s) Oral at bedtime  pantoprazole    Tablet 40 milliGRAM(s) Oral before breakfast  simvastatin 10 milliGRAM(s) Oral at bedtime  tiotropium 18 MICROgram(s) Capsule 1 Capsule(s) Inhalation daily    MEDICATIONS  (PRN):  ALBUTerol    90 MICROgram(s) HFA Inhaler 2 Puff(s) Inhalation every 6 hours PRN SOB  simethicone 80 milliGRAM(s) Chew daily PRN Gas      No Known Allergies      LABS:   COVID-19 PCR: Detected <!!> (02-21-21 @ 12:09)                          9.4    17.44 )-----------( 380      ( 27 Feb 2021 07:43 )             29.5     02-27    136  |  102  |  21  ----------------------------<  140<H>  4.8   |  25  |  0.73    Ca    9.0      27 Feb 2021 07:43  Phos  3.7     02-27  Mg     2.1     02-27    TPro  5.7<L>  /  Alb  2.5<L>  /  TBili  0.5  /  DBili  x   /  AST  13  /  ALT  8<L>  /  AlkPhos  95  02-27                Auto Neutrophil #: 13.25 K/uL <H> [1.80 - 7.40] (02-27-21 @ 07:43)  Auto Lymphocyte #: 1.72 K/uL [1.00 - 3.30] (02-27-21 @ 07:43)  C-Reactive Protein, Serum: 3.29 mg/dL <H> [0.00 - 0.40] (02-27-21 @ 07:43)  Ferritin, Serum: 356 ng/mL <H> [15 - 150] (02-27-21 @ 07:43)  D-Dimer Assay, Quantitative: 284 ng/mL DDU <H> (02-27-21 @ 07:43)  Auto Neutrophil #: 13.00 K/uL <H> [1.80 - 7.40] (02-26-21 @ 07:53)  Auto Lymphocyte #: 1.98 K/uL [1.00 - 3.30] (02-26-21 @ 07:53)  C-Reactive Protein, Serum: 0.78 mg/dL <H> [0.00 - 0.40] (02-26-21 @ 07:53)  D-Dimer Assay, Quantitative: 314 ng/mL DDU <H> (02-26-21 @ 07:53)  Ferritin, Serum: 377 ng/mL <H> [15 - 150] (02-26-21 @ 07:53)  Auto Neutrophil #: 10.96 K/uL <H> [1.80 - 7.40] (02-25-21 @ 08:20)  Auto Lymphocyte #: 1.42 K/uL [1.00 - 3.30] (02-25-21 @ 08:20)          RADIOLOGY, EKG & ADDITIONAL TESTS: Reviewed.   < from: US Duplex Venous Lower Ext Complete, Bilateral (02.23.21 @ 19:53) >    IMPRESSION: Negative study. No deep vein thrombosis seen.    < end of copied text >    < from: Xray Chest 1 View- PORTABLE-Urgent (Xray Chest 1 View- PORTABLE-Urgent .) (02.22.21 @ 10:20) >    Impression: Stable heart size, bilateral opacities and bony structures. Thoracic spine and bilateral shoulder degenerative changes. Right shoulder synovial chondromatosis.    < end of copied text >   TRANSFER ACCEPTANCE NOTE 2W TELEMETRY -> 4U [facility]    Hospital Course:   70F with PMH of Asthma (diagnosed after 9/11/01 attack), HTN, DM, HLD, L hip arthritis, and Bipolar Disorder was recently admitted for UTI with E. Coli bacteremia (complicated by sepsis, with acute kidney injury, rhabdomyolysis, and transaminitis) and discharged to subacute rehab facility. Patient presented from [facility] rehab 2/21/21 due to SOB, cough, and hypoxia from [facility], after having received Levaquin and Decadron since 2/16. ICU was consulted in the ED, and patient was deemed stable for admission to [facility] on nasal canula. On 2/22 the patient required NRB and was stepped up to COVID telemetry for further monitoring. LE dopplers were negative for DVT on 2/23. Urine culture grew E. faecium, however antibiotics were note administered as the patient denies dysuria and has remained afebrile. Patient completed 5 day course of Remdesivir and 5 days of Decadron 6mg to complete a 10 day course on 2/25. Patient's supplemental oxygen was decreased from 40/35 HFNC to 6L NC on 2/26. Patient was observed on 6L NC for approximately 24 hours and is now stable on 6L NC for stepdown to [facility].    SUBJECTIVE: Patient seen and examined at bedside. She states that her breathing is improved and that she slept well last night. She currently denies any acute complaints. She denies shortness of breath, fevers, chills, headache, chest pain, nausea, vomiting, dysuria, changes in bowel movements.  12pt ROS otherwise negative.    VITALS  Vital Signs Last 24 Hrs  T(C): 36.7 (27 Feb 2021 10:41), Max: 37.2 (26 Feb 2021 22:29)  T(F): 98 (27 Feb 2021 10:41), Max: 99 (26 Feb 2021 22:29)  HR: 80 (27 Feb 2021 10:41) (61 - 109)  BP: 116/50 (27 Feb 2021 10:41) (116/50 - 156/76)  BP(mean): 73 (27 Feb 2021 00:01) (73 - 76)  RR: 26 (27 Feb 2021 10:41) (16 - 283)  SpO2: 95% (27 Feb 2021 10:41) (89% - 96%)    CAPILLARY BLOOD GLUCOSE      POCT Blood Glucose.: 171 mg/dL (26 Feb 2021 21:46)  POCT Blood Glucose.: 161 mg/dL (26 Feb 2021 16:24)  POCT Blood Glucose.: 204 mg/dL (26 Feb 2021 11:50)  POCT Blood Glucose.: 110 mg/dL (26 Feb 2021 06:48)      PHYSICAL EXAM  General: resting comfortably in bed; NAD  HEENT: PERRL, EOMI; MMM  Neck: Supple; no JVD  Respiratory: CTAB; no W/R/R  Cardiovascular: RRR; +S1/S2; no M/R/G   Gastrointestinal: Soft; NT/ND  Extremities: WWP; no edema/cyanosis  Neurological: A&Ox3, CNII-XII grossly intact; no obvious focal deficits    MEDICATIONS  (STANDING):  albuterol/ipratropium for Nebulization 3 milliLiter(s) Nebulizer every 6 hours  amLODIPine   Tablet 5 milliGRAM(s) Oral every 24 hours  ARIPiprazole 20 milliGRAM(s) Oral daily  dextrose 40% Gel 15 Gram(s) Oral once  dextrose 5%. 1000 milliLiter(s) (50 mL/Hr) IV Continuous <Continuous>  dextrose 5%. 1000 milliLiter(s) (100 mL/Hr) IV Continuous <Continuous>  dextrose 50% Injectable 25 Gram(s) IV Push once  dextrose 50% Injectable 12.5 Gram(s) IV Push once  dextrose 50% Injectable 25 Gram(s) IV Push once  enoxaparin Injectable 40 milliGRAM(s) SubCutaneous every 12 hours  glucagon  Injectable 1 milliGRAM(s) IntraMuscular once  insulin lispro (ADMELOG) corrective regimen sliding scale   SubCutaneous Before meals and at bedtime  insulin lispro Injectable (ADMELOG) 4 Unit(s) SubCutaneous before lunch  insulin lispro Injectable (ADMELOG) 4 Unit(s) SubCutaneous before dinner  melatonin 5 milliGRAM(s) Oral at bedtime  pantoprazole    Tablet 40 milliGRAM(s) Oral before breakfast  simvastatin 10 milliGRAM(s) Oral at bedtime  tiotropium 18 MICROgram(s) Capsule 1 Capsule(s) Inhalation daily    MEDICATIONS  (PRN):  ALBUTerol    90 MICROgram(s) HFA Inhaler 2 Puff(s) Inhalation every 6 hours PRN SOB  simethicone 80 milliGRAM(s) Chew daily PRN Gas      No Known Allergies      LABS:   COVID-19 PCR: Detected <!!> (02-21-21 @ 12:09)                          9.4    17.44 )-----------( 380      ( 27 Feb 2021 07:43 )             29.5     02-27    136  |  102  |  21  ----------------------------<  140<H>  4.8   |  25  |  0.73    Ca    9.0      27 Feb 2021 07:43  Phos  3.7     02-27  Mg     2.1     02-27    TPro  5.7<L>  /  Alb  2.5<L>  /  TBili  0.5  /  DBili  x   /  AST  13  /  ALT  8<L>  /  AlkPhos  95  02-27                Auto Neutrophil #: 13.25 K/uL <H> [1.80 - 7.40] (02-27-21 @ 07:43)  Auto Lymphocyte #: 1.72 K/uL [1.00 - 3.30] (02-27-21 @ 07:43)  C-Reactive Protein, Serum: 3.29 mg/dL <H> [0.00 - 0.40] (02-27-21 @ 07:43)  Ferritin, Serum: 356 ng/mL <H> [15 - 150] (02-27-21 @ 07:43)  D-Dimer Assay, Quantitative: 284 ng/mL DDU <H> (02-27-21 @ 07:43)  Auto Neutrophil #: 13.00 K/uL <H> [1.80 - 7.40] (02-26-21 @ 07:53)  Auto Lymphocyte #: 1.98 K/uL [1.00 - 3.30] (02-26-21 @ 07:53)  C-Reactive Protein, Serum: 0.78 mg/dL <H> [0.00 - 0.40] (02-26-21 @ 07:53)  D-Dimer Assay, Quantitative: 314 ng/mL DDU <H> (02-26-21 @ 07:53)  Ferritin, Serum: 377 ng/mL <H> [15 - 150] (02-26-21 @ 07:53)  Auto Neutrophil #: 10.96 K/uL <H> [1.80 - 7.40] (02-25-21 @ 08:20)  Auto Lymphocyte #: 1.42 K/uL [1.00 - 3.30] (02-25-21 @ 08:20)          RADIOLOGY, EKG & ADDITIONAL TESTS: Reviewed.   < from: US Duplex Venous Lower Ext Complete, Bilateral (02.23.21 @ 19:53) >    IMPRESSION: Negative study. No deep vein thrombosis seen.    < end of copied text >    < from: Xray Chest 1 View- PORTABLE-Urgent (Xray Chest 1 View- PORTABLE-Urgent .) (02.22.21 @ 10:20) >    Impression: Stable heart size, bilateral opacities and bony structures. Thoracic spine and bilateral shoulder degenerative changes. Right shoulder synovial chondromatosis.    < end of copied text >

## 2021-02-27 NOTE — PROGRESS NOTE ADULT - PROBLEM SELECTOR PLAN 8
Baseline approx 9. No active signs of bleeding. Ferritin elevated, haptoglobin, transferritin decreased, retic hypoproliferative      #Elevated INR  INR 1.75 as on apixaban 2.5 mg BID since 2/16 for DVT ppx while at [facility] rehab.  - c/w lovenox dvt ppx

## 2021-02-27 NOTE — PROGRESS NOTE ADULT - PROBLEM SELECTOR PLAN 3
-Continue home medication abilify    #B/l arm tremors  -Per patient, 2/2 to her abilify as a medication side effect, monitor Patient was last discharged with Duoneb nebulization q4h, but was not previously on inhaler medications.  - continue albuterol 2 puffs q6h PRN  - continue Spiriva daily  - Duoneb q6h

## 2021-02-27 NOTE — PROGRESS NOTE ADULT - PROBLEM SELECTOR PLAN 7
Home med: lisinopril 40mg at home (though more for leg swelling), KS'ed on previous january hospital adm i/s/o low BP and FACUNDO  - Continue amlodipine 5mg    #Lower extremity edema.    Reports starting Lasix for LE edema. No Dx of CHF.  - Was held on previous admission and discharge as mentioned above    #HLD  -Continue simvastatin 10 mg QD home med

## 2021-02-27 NOTE — PROGRESS NOTE ADULT - PROBLEM SELECTOR PLAN 1
#Acute Hypoxic Respiratory Failure   Due to COVID as per below. Lasix 40mg PO d/c'd during January hospital admission due to FACUNDO.   - Low suspicion for PE as 2/23 LE doppler negative for DVT, stable O2 requirements, low d-dimer,.  - consider restarting lasix 40mg PO if evidence of fluid overload    #Pneumonia 2/2 to COVID PNA.   - s/p Decadron 6mg for 10 day course until 2/25 (do 5 more days as pt has been on since 2/16)  - s/p Remdesevir  2/21- 2/25    #Metabolic alkalosis  Patient had been discharged in previous January adm on Na Bicarb in setting of HAGMa from renal failure. Renal function found to be recovered on this adm, with no AG. This metabolic alkalosis likely iatrogenic from the Na bicarb.   - hold na bicarb #Acute Hypoxic Respiratory Failure   Due to COVID as per below. Lasix 40mg PO d/c'd during January hospital admission due to FACUNDO.   - Low suspicion for PE as 2/23 LE doppler negative for DVT, stable O2 requirements, low d-dimer,.  - consider restarting lasix 40mg PO if evidence of fluid overload    #Pneumonia 2/2 to COVID PNA.   - s/p Decadron 6mg for 10 day course until 2/25   - s/p Remdesevir  2/21- 2/25    #Metabolic alkalosis  Patient had been discharged in previous January adm on Na Bicarb in setting of HAGMa from renal failure. Renal function found to be recovered on this adm, with no AG. This metabolic alkalosis likely iatrogenic from the Na bicarb.   - hold na bicarb

## 2021-02-28 LAB
ALBUMIN SERPL ELPH-MCNC: 2.7 G/DL — LOW (ref 3.3–5)
ALP SERPL-CCNC: 81 U/L — SIGNIFICANT CHANGE UP (ref 40–120)
ALT FLD-CCNC: 8 U/L — LOW (ref 10–45)
ANION GAP SERPL CALC-SCNC: 9 MMOL/L — SIGNIFICANT CHANGE UP (ref 5–17)
AST SERPL-CCNC: 11 U/L — SIGNIFICANT CHANGE UP (ref 10–40)
BASOPHILS # BLD AUTO: 0.02 K/UL — SIGNIFICANT CHANGE UP (ref 0–0.2)
BASOPHILS NFR BLD AUTO: 0.1 % — SIGNIFICANT CHANGE UP (ref 0–2)
BILIRUB SERPL-MCNC: 0.6 MG/DL — SIGNIFICANT CHANGE UP (ref 0.2–1.2)
BUN SERPL-MCNC: 16 MG/DL — SIGNIFICANT CHANGE UP (ref 7–23)
CALCIUM SERPL-MCNC: 9.4 MG/DL — SIGNIFICANT CHANGE UP (ref 8.4–10.5)
CHLORIDE SERPL-SCNC: 101 MMOL/L — SIGNIFICANT CHANGE UP (ref 96–108)
CO2 SERPL-SCNC: 27 MMOL/L — SIGNIFICANT CHANGE UP (ref 22–31)
CREAT SERPL-MCNC: 0.72 MG/DL — SIGNIFICANT CHANGE UP (ref 0.5–1.3)
CRP SERPL-MCNC: 3.52 MG/DL — HIGH (ref 0–0.4)
D DIMER BLD IA.RAPID-MCNC: 233 NG/ML DDU — HIGH
EOSINOPHIL # BLD AUTO: 0.24 K/UL — SIGNIFICANT CHANGE UP (ref 0–0.5)
EOSINOPHIL NFR BLD AUTO: 1.5 % — SIGNIFICANT CHANGE UP (ref 0–6)
FERRITIN SERPL-MCNC: 387 NG/ML — HIGH (ref 15–150)
GLUCOSE BLDC GLUCOMTR-MCNC: 109 MG/DL — HIGH (ref 70–99)
GLUCOSE BLDC GLUCOMTR-MCNC: 130 MG/DL — HIGH (ref 70–99)
GLUCOSE BLDC GLUCOMTR-MCNC: 181 MG/DL — HIGH (ref 70–99)
GLUCOSE BLDC GLUCOMTR-MCNC: 279 MG/DL — HIGH (ref 70–99)
GLUCOSE SERPL-MCNC: 98 MG/DL — SIGNIFICANT CHANGE UP (ref 70–99)
HCT VFR BLD CALC: 29.9 % — LOW (ref 34.5–45)
HGB BLD-MCNC: 9.4 G/DL — LOW (ref 11.5–15.5)
IMM GRANULOCYTES NFR BLD AUTO: 3.9 % — HIGH (ref 0–1.5)
LYMPHOCYTES # BLD AUTO: 1.5 K/UL — SIGNIFICANT CHANGE UP (ref 1–3.3)
LYMPHOCYTES # BLD AUTO: 9.3 % — LOW (ref 13–44)
MAGNESIUM SERPL-MCNC: 1.9 MG/DL — SIGNIFICANT CHANGE UP (ref 1.6–2.6)
MCHC RBC-ENTMCNC: 28.4 PG — SIGNIFICANT CHANGE UP (ref 27–34)
MCHC RBC-ENTMCNC: 31.4 GM/DL — LOW (ref 32–36)
MCV RBC AUTO: 90.3 FL — SIGNIFICANT CHANGE UP (ref 80–100)
MONOCYTES # BLD AUTO: 1.19 K/UL — HIGH (ref 0–0.9)
MONOCYTES NFR BLD AUTO: 7.4 % — SIGNIFICANT CHANGE UP (ref 2–14)
NEUTROPHILS # BLD AUTO: 12.5 K/UL — HIGH (ref 1.8–7.4)
NEUTROPHILS NFR BLD AUTO: 77.8 % — HIGH (ref 43–77)
NRBC # BLD: 0 /100 WBCS — SIGNIFICANT CHANGE UP (ref 0–0)
PHOSPHATE SERPL-MCNC: 3.3 MG/DL — SIGNIFICANT CHANGE UP (ref 2.5–4.5)
PLATELET # BLD AUTO: 361 K/UL — SIGNIFICANT CHANGE UP (ref 150–400)
POTASSIUM SERPL-MCNC: 4.8 MMOL/L — SIGNIFICANT CHANGE UP (ref 3.5–5.3)
POTASSIUM SERPL-SCNC: 4.8 MMOL/L — SIGNIFICANT CHANGE UP (ref 3.5–5.3)
PROT SERPL-MCNC: 5.9 G/DL — LOW (ref 6–8.3)
RBC # BLD: 3.31 M/UL — LOW (ref 3.8–5.2)
RBC # FLD: 14.5 % — SIGNIFICANT CHANGE UP (ref 10.3–14.5)
SODIUM SERPL-SCNC: 137 MMOL/L — SIGNIFICANT CHANGE UP (ref 135–145)
WBC # BLD: 16.07 K/UL — HIGH (ref 3.8–10.5)
WBC # FLD AUTO: 16.07 K/UL — HIGH (ref 3.8–10.5)

## 2021-02-28 PROCEDURE — 99233 SBSQ HOSP IP/OBS HIGH 50: CPT

## 2021-02-28 RX ADMIN — ARIPIPRAZOLE 20 MILLIGRAM(S): 15 TABLET ORAL at 11:35

## 2021-02-28 RX ADMIN — Medication 5 MILLIGRAM(S): at 21:34

## 2021-02-28 RX ADMIN — Medication 4 UNIT(S): at 17:52

## 2021-02-28 RX ADMIN — PANTOPRAZOLE SODIUM 40 MILLIGRAM(S): 20 TABLET, DELAYED RELEASE ORAL at 06:24

## 2021-02-28 RX ADMIN — ENOXAPARIN SODIUM 40 MILLIGRAM(S): 100 INJECTION SUBCUTANEOUS at 17:21

## 2021-02-28 RX ADMIN — SIMVASTATIN 10 MILLIGRAM(S): 20 TABLET, FILM COATED ORAL at 21:34

## 2021-02-28 RX ADMIN — TIOTROPIUM BROMIDE 1 CAPSULE(S): 18 CAPSULE ORAL; RESPIRATORY (INHALATION) at 14:12

## 2021-02-28 RX ADMIN — Medication 3 MILLILITER(S): at 06:24

## 2021-02-28 RX ADMIN — Medication 6: at 12:15

## 2021-02-28 RX ADMIN — ENOXAPARIN SODIUM 40 MILLIGRAM(S): 100 INJECTION SUBCUTANEOUS at 06:24

## 2021-02-28 RX ADMIN — AMLODIPINE BESYLATE 5 MILLIGRAM(S): 2.5 TABLET ORAL at 11:35

## 2021-02-28 RX ADMIN — Medication 2: at 17:52

## 2021-02-28 RX ADMIN — Medication 4 UNIT(S): at 12:15

## 2021-02-28 RX ADMIN — Medication 3 MILLILITER(S): at 11:35

## 2021-02-28 NOTE — PROGRESS NOTE ADULT - PROBLEM SELECTOR PLAN 8
RESOLVED  2/4 SIRS (RR and WBC), CXR w/b/l infiltrate. COVID-19 PCR positive 2/21. Lactate < 2. Procalcitonin normal, low suspicion for bacterial process.   - Blood cultures with no growth, finalized  - Urine culture grew >100,000 CFU of E. faecium, however, patient denies polyuria/dysuria. Cause of initial sepsis likely COVID-19 as patient is improving without antibiotic therapy.

## 2021-02-28 NOTE — PROGRESS NOTE ADULT - PROBLEM SELECTOR PLAN 5
Baseline hemoglobin approximately 9. No active signs of bleeding. Ferritin elevated; hemoglobin and transferrin decreased; reticulocytes hypoproliferative      #Elevated INR  INR 1.75  - likely due to apixaban 2.5 mg BID since 2/16 for DVT prophylaxis while at [facility] rehab.  - continue Lovenox for DVT prophylaxis, 40mg BID as BMI >35

## 2021-02-28 NOTE — PHYSICAL THERAPY INITIAL EVALUATION ADULT - AMBULATION SKILLS, REHAB EVAL
ambulating with 2 person asssit and rolling walker/needed assist/needs device
needed assist/needs device

## 2021-02-28 NOTE — PROGRESS NOTE ADULT - ASSESSMENT
70F with PMH of Asthma (diagnosed after 9/11/01 attack), HTN, DM, HLD, L hip arthritis, Bipolar Disorder, and recent admission for UTI with E. Coli bacteremia, presented from [facility] due to hypoxia, and was admitted to [facility] for management of COVID infection (+2/21). Patient was transferred to telemetry for HFNC; hypoxemia improved and weaned to NC. Patient deemed to be stable for transfer to [facility] 2/27.

## 2021-02-28 NOTE — PHYSICAL THERAPY INITIAL EVALUATION ADULT - PLANNED THERAPY INTERVENTIONS, PT EVAL
balance training/bed mobility training/gait training/strengthening/transfer training
balance training/bed mobility training/gait training/postural re-education/strengthening/transfer training

## 2021-02-28 NOTE — PROGRESS NOTE ADULT - PROBLEM SELECTOR PLAN 1
#Acute Hypoxic Respiratory Failure   Due COVID-19 as detailed below.  - Low suspicion for PE as 2/23 LE doppler negative for DVT, low D-dimer and stable supplemental oxygen requirements.  - consider prior starting Lasix 40mg PO if evidence of fluid overload (was stopped last admission in setting of renal failure)  - COVID-19 management detailed below    #Metabolic alkalosis  RESOLVED  Patient had been discharged on sodium bicarbonate in January 2021 in setting of HAGMA from renal failure. Renal function has recovered with no AG.  - Patient had temporary dialysis on last admission  - Metabolic alkalosis likely iatrogenic from sodium bicarbonate.   - Discontinued sodium bicarbonate

## 2021-02-28 NOTE — PHYSICAL THERAPY INITIAL EVALUATION ADULT - IMPAIRMENTS FOUND, PT EVAL
aerobic capacity/endurance/gait, locomotion, and balance/gross motor/muscle strength/posture
aerobic capacity/endurance/gait, locomotion, and balance/ventilation and respiration/gas exchange

## 2021-02-28 NOTE — PHYSICAL THERAPY INITIAL EVALUATION ADULT - PERTINENT HX OF CURRENT PROBLEM, REHAB EVAL
70F recently admitted for UTI with E. Coli bacteremia (complicated by sepsis, with acute kidney injury, rhabdomyolysis, and transaminitis, dc'd to subacute rehab facility, now presenting from [facility] rehab due to SOB and hypoxia. Pt states she has been having SOB and mild cough starting a week ago (with some nighttime awakenings, gasping for air) and was told she had "viral PNA"
70F with PMH of Asthma (diagnosed after 9/11/01 attack), HTN, DM, HLD, L hip arthritis, Bipolar Disorder, and recent admission for UTI with E. Coli bacteremia, presented from [facility] due to hypoxia, and was admitted to [facility] for management of COVID infection (+2/21). Patient was transferred to telemetry for HFNC; hypoxemia improved and weaned to NC. Patient deemed to be stable for transfer to [facility] 2/27.

## 2021-02-28 NOTE — PHYSICAL THERAPY INITIAL EVALUATION ADULT - GENERAL OBSERVATIONS, REHAB EVAL
Received supine in NAD +IV, primafit, EKG, HFNC @ 40% Fi02. Left as found +RN Honey aware, call ledezma
Patient received semi fowlers (+) heplock tolerating bedside ambulation limited by complaints of feeling cold and shivering

## 2021-02-28 NOTE — PHYSICAL THERAPY INITIAL EVALUATION ADULT - LEVEL OF INDEPENDENCE: SIT/STAND, REHAB EVAL
minimum assist (75% patients effort)
minimum assist (75% patients effort)/moderate assist (50% patients effort)

## 2021-02-28 NOTE — PHYSICAL THERAPY INITIAL EVALUATION ADULT - MANUAL MUSCLE TESTING RESULTS, REHAB EVAL
functional observation against gravity > 3/5 MMT/grossly assessed due to
B UE and B LE >3+/5 upon functional assessment against gravity.

## 2021-02-28 NOTE — PHYSICAL THERAPY INITIAL EVALUATION ADULT - GAIT DEVIATIONS NOTED, PT EVAL
decreased latoya/decreased step length/decreased stride length
decreased latoya/decreased step length/decreased weight-shifting ability

## 2021-02-28 NOTE — PROGRESS NOTE ADULT - PROBLEM SELECTOR PLAN 6
Patient was on Onglyza and Metformin in the past. A1c of 7.8 on 1/25/21. Patient was discharged to [facility] rehab on mISS  - continue mISS    #Reflux/Abdominal Gas  -Continue from rehab, prilosec 20 mg QD; continue probiotic; continue simethicone 80 mg qD PRN for gas

## 2021-02-28 NOTE — PHYSICAL THERAPY INITIAL EVALUATION ADULT - PRECAUTIONS/LIMITATIONS, REHAB EVAL
4 L nc/fall precautions/oxygen therapy device and L/min
fall precautions/isolation precautions/oxygen therapy device and L/min

## 2021-02-28 NOTE — PROGRESS NOTE ADULT - PROBLEM SELECTOR PLAN 10
F: None indicated  E: Replete when K<4 or Mg<2  N: DASH/TLC diet  GI prophylaxis: None  DVT: Lovenox 40 mg Q12 hrs    Dispo: PT rec back to [facility]. Day 10 will be Tuesday, can go to [facility] at that time if on 4L O2 or less

## 2021-02-28 NOTE — PROGRESS NOTE ADULT - SUBJECTIVE AND OBJECTIVE BOX
Subjective/Interval events:  No acute overnight events. Breathing comfortably today, says she feels "fine." minimal cough, no fevers. Eager to work with PT so she can get stronger. Weaned to 4L O2 on my exam.    MEDICATIONS  (STANDING):  albuterol/ipratropium for Nebulization 3 milliLiter(s) Nebulizer every 6 hours  amLODIPine   Tablet 5 milliGRAM(s) Oral every 24 hours  ARIPiprazole 20 milliGRAM(s) Oral daily  dextrose 40% Gel 15 Gram(s) Oral once  dextrose 5%. 1000 milliLiter(s) (50 mL/Hr) IV Continuous <Continuous>  dextrose 5%. 1000 milliLiter(s) (100 mL/Hr) IV Continuous <Continuous>  dextrose 50% Injectable 25 Gram(s) IV Push once  dextrose 50% Injectable 12.5 Gram(s) IV Push once  dextrose 50% Injectable 25 Gram(s) IV Push once  enoxaparin Injectable 40 milliGRAM(s) SubCutaneous every 12 hours  glucagon  Injectable 1 milliGRAM(s) IntraMuscular once  insulin lispro (ADMELOG) corrective regimen sliding scale   SubCutaneous Before meals and at bedtime  insulin lispro Injectable (ADMELOG) 4 Unit(s) SubCutaneous before lunch  insulin lispro Injectable (ADMELOG) 4 Unit(s) SubCutaneous before dinner  melatonin 5 milliGRAM(s) Oral at bedtime  pantoprazole    Tablet 40 milliGRAM(s) Oral before breakfast  simvastatin 10 milliGRAM(s) Oral at bedtime  tiotropium 18 MICROgram(s) Capsule 1 Capsule(s) Inhalation daily    MEDICATIONS  (PRN):  ALBUTerol    90 MICROgram(s) HFA Inhaler 2 Puff(s) Inhalation every 6 hours PRN SOB  simethicone 80 milliGRAM(s) Chew daily PRN Gas      Vital Signs Last 24 Hrs  T(C): 36.8 (28 Feb 2021 08:17), Max: 37.2 (27 Feb 2021 14:00)  T(F): 98.3 (28 Feb 2021 08:17), Max: 98.9 (27 Feb 2021 14:00)  HR: 72 (28 Feb 2021 11:42) (65 - 85)  BP: 138/68 (28 Feb 2021 08:17) (123/66 - 138/68)  BP(mean): --  RR: 17 (28 Feb 2021 11:42) (16 - 22)  SpO2: 97% (28 Feb 2021 11:42) (92% - 97%)    PHYSICAL EXAM:  GENERAL: pleasant, appropriate, no acute distress. Participating appropriately in interview  HEENT - NC/AT, EOMI, PERLLA, oropharynx clear without exudate or erythema, moist mucus membranes  NECK: Soft, supple, No JVD, no lymphadenopathy  CHEST/LUNG: Clear to auscultation bilaterally; No rales, rhonchi, wheezing. Normal work of breathing, not tachypneic  HEART: Regular rate and rhythm; No murmurs, rubs, or gallops, normal s1/s2. Warm and well-perfused  ABDOMEN: obese, soft, Nontender, Nondistended. Normoactive bowel sounds.  EXTREMITIES:  2+ Peripheral Pulses, No clubbing, cyanosis, or edema  NEURO:  awake, alert, oriented to person, place, time, and situation. Cranial nerves intact, no motor or sensory deficits. Moves all extremities.  SKIN: No rashes or lesions    LABS:  02-28    137  |  101  |  16  ----------------------------<  98  4.8   |  27  |  0.72    Ca    9.4      28 Feb 2021 07:40  Phos  3.3     02-28  Mg     1.9     02-28    TPro  5.9<L>  /  Alb  2.7<L>  /  TBili  0.6  /  DBili  x   /  AST  11  /  ALT  8<L>  /  AlkPhos  81  02-28                          9.4    16.07 )-----------( 361      ( 28 Feb 2021 07:40 )             29.9     RADIOLOGY & ADDITIONAL TESTS:  reviewed, none new

## 2021-02-28 NOTE — PROGRESS NOTE ADULT - PROBLEM SELECTOR PLAN 7
Patient takes 20mg Abilify daily.  - Continue home medication, Abilify 20mg    #Bilateral arm tremors  - as per the patient, the tremors are a side effect of the Abilify  - continue to monitor

## 2021-02-28 NOTE — PROGRESS NOTE ADULT - PROBLEM SELECTOR PLAN 3
Patient was last discharged with Duoneb nebulization q4h, but was not previously on inhaler medications.  - continue albuterol 2 puffs q6h PRN  - continue Spiriva daily  - Duoneb q6h - changing to prn

## 2021-02-28 NOTE — PHYSICAL THERAPY INITIAL EVALUATION ADULT - CRITERIA FOR SKILLED THERAPEUTIC INTERVENTIONS
impairments found/functional limitations in following categories/anticipated discharge recommendation
impairments found/functional limitations in following categories/risk reduction/prevention/rehab potential/anticipated discharge recommendation

## 2021-02-28 NOTE — PROGRESS NOTE ADULT - PROBLEM SELECTOR PLAN 4
Patient was prescribed lisinopril 40mg in the past and it was stopped on last admission January 2021 due to hypotension and FACUNDO secondary to sepsis.  - continue amlodipine 5mg  - blood pressure currently well controlled    #Lower extremity edema  Patient was prescribed Lasix for LE edema in the past, but it was stopped on last admission for hypotension and FACUNDO in setting of sepsis. Patient has not been diagnosed with CHF.    #HLD  - Continue simvastatin 10 mg QD home med

## 2021-02-28 NOTE — PHYSICAL THERAPY INITIAL EVALUATION ADULT - DIAGNOSIS, PT EVAL
6A: Primary Prevention/Risk Reduction for Cardiovascular/Pulmonary Disorders
6B: Impaired Aerobic Capacity/Endurance Associated with Deconditioning

## 2021-02-28 NOTE — PHYSICAL THERAPY INITIAL EVALUATION ADULT - ADDITIONAL COMMENTS
prior to [facility] rehab, patient living with mother, ambulating with rollator, independent, apartment, elevator
Presents from [facility], ambulating with RW, lives home with 93 yo mother who she cares for

## 2021-03-01 ENCOUNTER — TRANSCRIPTION ENCOUNTER (OUTPATIENT)
Age: 71
End: 2021-03-01

## 2021-03-01 LAB
ALBUMIN SERPL ELPH-MCNC: 2.5 G/DL — LOW (ref 3.3–5)
ALP SERPL-CCNC: 68 U/L — SIGNIFICANT CHANGE UP (ref 40–120)
ALT FLD-CCNC: 7 U/L — LOW (ref 10–45)
ANION GAP SERPL CALC-SCNC: 7 MMOL/L — SIGNIFICANT CHANGE UP (ref 5–17)
AST SERPL-CCNC: 11 U/L — SIGNIFICANT CHANGE UP (ref 10–40)
BASOPHILS # BLD AUTO: 0.02 K/UL — SIGNIFICANT CHANGE UP (ref 0–0.2)
BASOPHILS NFR BLD AUTO: 0.2 % — SIGNIFICANT CHANGE UP (ref 0–2)
BILIRUB SERPL-MCNC: 0.5 MG/DL — SIGNIFICANT CHANGE UP (ref 0.2–1.2)
BUN SERPL-MCNC: 14 MG/DL — SIGNIFICANT CHANGE UP (ref 7–23)
CALCIUM SERPL-MCNC: 9.6 MG/DL — SIGNIFICANT CHANGE UP (ref 8.4–10.5)
CHLORIDE SERPL-SCNC: 101 MMOL/L — SIGNIFICANT CHANGE UP (ref 96–108)
CO2 SERPL-SCNC: 26 MMOL/L — SIGNIFICANT CHANGE UP (ref 22–31)
CREAT SERPL-MCNC: 0.68 MG/DL — SIGNIFICANT CHANGE UP (ref 0.5–1.3)
CRP SERPL-MCNC: 2.51 MG/DL — HIGH (ref 0–0.4)
D DIMER BLD IA.RAPID-MCNC: 218 NG/ML DDU — SIGNIFICANT CHANGE UP
EOSINOPHIL # BLD AUTO: 0.32 K/UL — SIGNIFICANT CHANGE UP (ref 0–0.5)
EOSINOPHIL NFR BLD AUTO: 2.7 % — SIGNIFICANT CHANGE UP (ref 0–6)
FERRITIN SERPL-MCNC: 330 NG/ML — HIGH (ref 15–150)
GLUCOSE BLDC GLUCOMTR-MCNC: 129 MG/DL — HIGH (ref 70–99)
GLUCOSE BLDC GLUCOMTR-MCNC: 178 MG/DL — HIGH (ref 70–99)
GLUCOSE BLDC GLUCOMTR-MCNC: 199 MG/DL — HIGH (ref 70–99)
GLUCOSE BLDC GLUCOMTR-MCNC: 220 MG/DL — HIGH (ref 70–99)
GLUCOSE SERPL-MCNC: 124 MG/DL — HIGH (ref 70–99)
HCT VFR BLD CALC: 27.8 % — LOW (ref 34.5–45)
HGB BLD-MCNC: 8.8 G/DL — LOW (ref 11.5–15.5)
IMM GRANULOCYTES NFR BLD AUTO: 2.9 % — HIGH (ref 0–1.5)
LYMPHOCYTES # BLD AUTO: 1.56 K/UL — SIGNIFICANT CHANGE UP (ref 1–3.3)
LYMPHOCYTES # BLD AUTO: 13.2 % — SIGNIFICANT CHANGE UP (ref 13–44)
MAGNESIUM SERPL-MCNC: 1.7 MG/DL — SIGNIFICANT CHANGE UP (ref 1.6–2.6)
MCHC RBC-ENTMCNC: 28.3 PG — SIGNIFICANT CHANGE UP (ref 27–34)
MCHC RBC-ENTMCNC: 31.7 GM/DL — LOW (ref 32–36)
MCV RBC AUTO: 89.4 FL — SIGNIFICANT CHANGE UP (ref 80–100)
MONOCYTES # BLD AUTO: 1 K/UL — HIGH (ref 0–0.9)
MONOCYTES NFR BLD AUTO: 8.4 % — SIGNIFICANT CHANGE UP (ref 2–14)
NEUTROPHILS # BLD AUTO: 8.62 K/UL — HIGH (ref 1.8–7.4)
NEUTROPHILS NFR BLD AUTO: 72.6 % — SIGNIFICANT CHANGE UP (ref 43–77)
NRBC # BLD: 0 /100 WBCS — SIGNIFICANT CHANGE UP (ref 0–0)
PHOSPHATE SERPL-MCNC: 2.5 MG/DL — SIGNIFICANT CHANGE UP (ref 2.5–4.5)
PLATELET # BLD AUTO: 328 K/UL — SIGNIFICANT CHANGE UP (ref 150–400)
POTASSIUM SERPL-MCNC: 4.7 MMOL/L — SIGNIFICANT CHANGE UP (ref 3.5–5.3)
POTASSIUM SERPL-SCNC: 4.7 MMOL/L — SIGNIFICANT CHANGE UP (ref 3.5–5.3)
PROT SERPL-MCNC: 5.6 G/DL — LOW (ref 6–8.3)
RBC # BLD: 3.11 M/UL — LOW (ref 3.8–5.2)
RBC # FLD: 14.4 % — SIGNIFICANT CHANGE UP (ref 10.3–14.5)
SODIUM SERPL-SCNC: 134 MMOL/L — LOW (ref 135–145)
WBC # BLD: 11.86 K/UL — HIGH (ref 3.8–10.5)
WBC # FLD AUTO: 11.86 K/UL — HIGH (ref 3.8–10.5)

## 2021-03-01 PROCEDURE — 99232 SBSQ HOSP IP/OBS MODERATE 35: CPT

## 2021-03-01 RX ORDER — LISINOPRIL 2.5 MG/1
20 TABLET ORAL DAILY
Refills: 0 | Status: DISCONTINUED | OUTPATIENT
Start: 2021-03-02 | End: 2021-03-04

## 2021-03-01 RX ORDER — MAGNESIUM SULFATE 500 MG/ML
2 VIAL (ML) INJECTION ONCE
Refills: 0 | Status: COMPLETED | OUTPATIENT
Start: 2021-03-01 | End: 2021-03-01

## 2021-03-01 RX ADMIN — Medication 4: at 22:08

## 2021-03-01 RX ADMIN — AMLODIPINE BESYLATE 5 MILLIGRAM(S): 2.5 TABLET ORAL at 12:53

## 2021-03-01 RX ADMIN — Medication 4 UNIT(S): at 18:02

## 2021-03-01 RX ADMIN — ARIPIPRAZOLE 20 MILLIGRAM(S): 15 TABLET ORAL at 12:53

## 2021-03-01 RX ADMIN — SIMVASTATIN 10 MILLIGRAM(S): 20 TABLET, FILM COATED ORAL at 22:52

## 2021-03-01 RX ADMIN — Medication 2: at 18:02

## 2021-03-01 RX ADMIN — Medication 50 GRAM(S): at 12:52

## 2021-03-01 RX ADMIN — ENOXAPARIN SODIUM 40 MILLIGRAM(S): 100 INJECTION SUBCUTANEOUS at 18:01

## 2021-03-01 RX ADMIN — Medication 4 UNIT(S): at 12:52

## 2021-03-01 RX ADMIN — ENOXAPARIN SODIUM 40 MILLIGRAM(S): 100 INJECTION SUBCUTANEOUS at 06:16

## 2021-03-01 RX ADMIN — Medication 2: at 12:51

## 2021-03-01 RX ADMIN — Medication 5 MILLIGRAM(S): at 22:08

## 2021-03-01 RX ADMIN — TIOTROPIUM BROMIDE 1 CAPSULE(S): 18 CAPSULE ORAL; RESPIRATORY (INHALATION) at 13:22

## 2021-03-01 RX ADMIN — PANTOPRAZOLE SODIUM 40 MILLIGRAM(S): 20 TABLET, DELAYED RELEASE ORAL at 06:15

## 2021-03-01 NOTE — PROGRESS NOTE ADULT - SUBJECTIVE AND OBJECTIVE BOX
OVERNIGHT EVENTS: OSIRIS    SUBJECTIVE / INTERVAL HPI: Patient seen and examined at bedside. Patient is no acute distress, reports feeling better today, denies chest pain, SOB, ab pain, n/v/d.    ROS: 12-point review of system negative except mentioned above.     VITAL SIGNS:  Vital Signs Last 24 Hrs  T(C): 37 (28 Feb 2021 20:37), Max: 37 (28 Feb 2021 20:37)  T(F): 98.6 (28 Feb 2021 20:37), Max: 98.6 (28 Feb 2021 20:37)  HR: 72 (01 Mar 2021 07:02) (67 - 94)  BP: 118/74 (28 Feb 2021 20:37) (118/74 - 118/74)  BP(mean): --  RR: 20 (01 Mar 2021 07:02) (17 - 20)  SpO2: 95% (01 Mar 2021 07:02) (93% - 97%)    PHYSICAL EXAM:    General: WDWN  HEENT: NC/AT; PERRL, anicteric sclera; MMM  Neck: supple  Cardiovascular: +S1/S2, RRR  Respiratory: CTA B/L; no W/R/R  Gastrointestinal: soft, NT/ND; +BSx4  Extremities: WWP; no edema, clubbing or cyanosis  Vascular: 2+ radial, DP/PT pulses B/L  Neurological: AAOx3; no focal deficits    MEDICATIONS:  MEDICATIONS  (STANDING):  amLODIPine   Tablet 5 milliGRAM(s) Oral every 24 hours  ARIPiprazole 20 milliGRAM(s) Oral daily  dextrose 40% Gel 15 Gram(s) Oral once  dextrose 5%. 1000 milliLiter(s) (50 mL/Hr) IV Continuous <Continuous>  dextrose 5%. 1000 milliLiter(s) (100 mL/Hr) IV Continuous <Continuous>  dextrose 50% Injectable 25 Gram(s) IV Push once  dextrose 50% Injectable 12.5 Gram(s) IV Push once  dextrose 50% Injectable 25 Gram(s) IV Push once  enoxaparin Injectable 40 milliGRAM(s) SubCutaneous every 12 hours  glucagon  Injectable 1 milliGRAM(s) IntraMuscular once  insulin lispro (ADMELOG) corrective regimen sliding scale   SubCutaneous Before meals and at bedtime  insulin lispro Injectable (ADMELOG) 4 Unit(s) SubCutaneous before lunch  insulin lispro Injectable (ADMELOG) 4 Unit(s) SubCutaneous before dinner  magnesium sulfate  IVPB 2 Gram(s) IV Intermittent once  melatonin 5 milliGRAM(s) Oral at bedtime  pantoprazole    Tablet 40 milliGRAM(s) Oral before breakfast  simvastatin 10 milliGRAM(s) Oral at bedtime  tiotropium 18 MICROgram(s) Capsule 1 Capsule(s) Inhalation daily    MEDICATIONS  (PRN):  ALBUTerol    90 MICROgram(s) HFA Inhaler 2 Puff(s) Inhalation every 6 hours PRN SOB  simethicone 80 milliGRAM(s) Chew daily PRN Gas      ALLERGIES:  Allergies    No Known Allergies    Intolerances        LABS:                        8.8    11.86 )-----------( 328      ( 01 Mar 2021 06:41 )             27.8     03-01    134<L>  |  101  |  14  ----------------------------<  124<H>  4.7   |  26  |  0.68    Ca    9.6      01 Mar 2021 06:41  Phos  2.5     03-01  Mg     1.7     03-01    TPro  5.6<L>  /  Alb  2.5<L>  /  TBili  0.5  /  DBili  x   /  AST  11  /  ALT  7<L>  /  AlkPhos  68  03-01        CAPILLARY BLOOD GLUCOSE      POCT Blood Glucose.: 129 mg/dL (01 Mar 2021 08:35)      RADIOLOGY & ADDITIONAL TESTS: Reviewed.

## 2021-03-01 NOTE — PROGRESS NOTE ADULT - PROBLEM SELECTOR PLAN 1
IMPROVING     Due COVID-19 as detailed below.  - Low suspicion for PE as 2/23 LE doppler negative for DVT, low D-dimer and stable supplemental oxygen requirements.  - consider starting Lasix 40mg PO if evidence of fluid overload  - COVID-19 management detailed below

## 2021-03-01 NOTE — PROGRESS NOTE ADULT - PROBLEM SELECTOR PLAN 3
Patient was last discharged with Duoneb nebulization q4h, but was not previously on inhaler medications.  - continue albuterol 2 puffs q6h PRN  - continue Spiriva daily  - Duoneb q6h

## 2021-03-01 NOTE — DISCHARGE NOTE PROVIDER - NSDCCPCAREPLAN_GEN_ALL_CORE_FT
PRINCIPAL DISCHARGE DIAGNOSIS  Diagnosis: COVID-19  Assessment and Plan of Treatment: You were diagnosed with the new COVID-19 coronavirus infection via a nasal swab. You were treated with remdesivir for 5 days and decadron for 10 days. Additional treatment for this infection is supportive care, which includes: rest, maintaining adequate oral intake of food and water, and taking acetaminophen/tylenol for fever. Please maintain a strict home quarantine for 10 days at home, and wear a surgical mask at all times when you need to be in close proximity with another human being. Take tylenol as needed, every 6 hours, with a maximum daily dose of 4,000 mg a day. Please visit your nearest urgent care or emergency department should you start to experience: severe shortness of breath, severe cough/wheezing/difficulty breathing, or fever >103 for 3 days. Please maintain a good healthy diet. If you have any questions, please call your primary care provider.         PRINCIPAL DISCHARGE DIAGNOSIS  Diagnosis: COVID-19  Assessment and Plan of Treatment: You were diagnosed with the new COVID-19 coronavirus infection via a nasal swab. You were treated with remdesivir for 5 days and decadron for 10 days. Additional treatment for this infection is supportive care, which includes: rest, maintaining adequate oral intake of food and water, and taking acetaminophen/tylenol for fever. Please maintain a strict home quarantine for 10 days at home, and wear a surgical mask at all times when you need to be in close proximity with another human being. Take tylenol as needed, every 6 hours, with a maximum daily dose of 4,000 mg a day. Please visit your nearest urgent care or emergency department should you start to experience: severe shortness of breath, severe cough/wheezing/difficulty breathing, or fever >103 for 3 days. Please maintain a good healthy diet. If you have any questions, please call your primary care provider.        SECONDARY DISCHARGE DIAGNOSES  Diagnosis: Labor abnormality  Assessment and Plan of Treatment: You were noted to have increased protein gap from your blood work from last admission (total protein 7.5 and albumin 2.8) and on this admission, you were found to have "Rouleaux formation" in your labs. This might be a result of acute inflmmatory response in the setting of infection. However, please follow up with your PCP to consider rule out multiple myeloma by performing serum/urine protein electrophoresis work up.

## 2021-03-01 NOTE — DISCHARGE NOTE PROVIDER - NSDCMRMEDTOKEN_GEN_ALL_CORE_FT
ARIPiprazole 20 mg oral tablet: 1 tab(s) orally once a day  insulin lispro 100 units/mL injectable solution: Sliding scale coverage three times a day with meals and before bedtime   Sugar 150-200: 2 units  201-250: 4 units   251-300: 6 units  301-350: 8 units  ipratropium-albuterol 0.5 mg-2.5 mg/3 mLinhalation solution: 3 milliliter(s) inhaled every 4 hours  lisinopril 40 mg oral tablet: 1 tab(s) orally once a day  PriLOSEC OTC 20 mg oral delayed release tablet: 1 tab(s) orally once a day  sAXagliptin 5 mg oral tablet: 1 tab(s) orally once a day  simethicone 80 mg oral tablet, chewable: 1 tab(s) orally once a day, As needed, Gas  simvastatin 10 mg oral tablet: 1 tab(s) orally once a day (at bedtime)  sodium bicarbonate 650 mg oral tablet: 1 tab(s) orally every 12 hours   ARIPiprazole 20 mg oral tablet: 1 tab(s) orally once a day  enoxaparin: 40 milligram(s) subcutaneous once a day (at bedtime)  ipratropium-albuterol 0.5 mg-2.5 mg/3 mLinhalation solution: 3 milliliter(s) inhaled every 4 hours  lisinopril 20 mg oral tablet: 1 tab(s) orally once a day  melatonin 5 mg oral tablet: 1 tab(s) orally once a day (at bedtime)  metFORMIN 1000 mg oral tablet: 1 tab(s) orally 2 times a day  PriLOSEC OTC 20 mg oral delayed release tablet: 1 tab(s) orally once a day  sAXagliptin 5 mg oral tablet: 1 tab(s) orally once a day  simethicone 80 mg oral tablet, chewable: 1 tab(s) orally once a day, As needed, Gas  simvastatin 10 mg oral tablet: 1 tab(s) orally once a day (at bedtime)

## 2021-03-01 NOTE — DISCHARGE NOTE PROVIDER - CARE PROVIDER_API CALL
Ashish Marlow)  Internal Medicine  [address] [address]  Phone: [phone]  Fax: [phone]  Follow Up Time: 2 weeks    Livia Couch)  Internal Medicine; Pulmonary Disease  [address] [address]  Phone: [phone]  Fax: [phone]  Follow Up Time: 2 weeks   Ashish Marlow)  Internal Medicine  [address] [address]  Phone: [phone]  Fax: [phone]  Scheduled Appointment: 03/11/2021 01:30 PM    Livia Couch)  Internal Medicine; Pulmonary Disease  [address] [address]  Phone: [phone]  Fax: [phone]  Follow Up Time: 2 weeks   Ashish Marlow)  Internal Medicine  [address] [address]  Phone: [phone]  Fax: [phone]  Scheduled Appointment: 03/11/2021 01:30 PM    Livia Couch)  Internal Medicine; Pulmonary Disease  [address] [address]  Phone: [phone]  Fax: [phone]  Scheduled Appointment: 03/15/2021 11:00 AM

## 2021-03-01 NOTE — PROGRESS NOTE ADULT - PROBLEM SELECTOR PLAN 5
Baseline hemoglobin approximately 9. No active signs of bleeding. Ferritin elevated; hemoglobin and transferrin decreased; reticulocytes hypoproliferative      #Elevated INR  INR 1.75  - likely due to apixaban 2.5 mg BID since 2/16 for DVT prophylaxis while at [facility] rehab.  - continue Lovenox for DVT prophylaxis, 40mg BID as BMI >35

## 2021-03-01 NOTE — DISCHARGE NOTE PROVIDER - HOSPITAL COURSE
Hospital course:     Problem List/Main Diagnoses:     Discharge day subjective:    ROS: 12-point ROS negative except mentioned above    Discharge physical exam:    New medications:     Labs to be followed outpatient:     Exam to be followed outpatient:    Hospital course:   70F with PMH of Asthma (diagnosed after 9/11/01 attack), HTN, DM, HLD, L hip arthritis, and Bipolar Disorder was recently admitted for UTI with E. Coli bacteremia (complicated by sepsis, with acute kidney injury, rhabdomyolysis, and transaminitis) and discharged to subacute rehab facility. Patient presented from [facility] rehab 2/21/21 due to SOB, cough, and hypoxia from COVID PNA, after having received Levaquin and Decadron since 2/16. Patient required brief tele stay for HFNC, and subsequently weaned off. LE dopplers were negative for DVT on 2/23. Urine culture grew E. faecium, however antibiotics were note administered as the patient denies dysuria and has remained afebrile. Patient completed 5 day course of Remdesivir and 5 days of Decadron 6mg to complete a 10 day course on 2/25. Patient now satting well on 4L NC.     Problem List/Main Diagnoses:   Acute respiratory failure with hypoxia 2/2 COVID PNA  - Low suspicion for PE as 2/23 LE doppler negative for DVT, low D-dimer and stable supplemental oxygen requirements.  - was treated with 6 days of Levaquin and 8mg decadron at [facility] (2/16-2/21)  - rest of 10 day decadron course completed inpatient with 6mg daily until 2/25  - was treated with 5 days of Remdesivir (2/21-2/25)  - Currently on 4L NC      Discharge day subjective:    ROS: 12-point ROS negative except mentioned above    Discharge physical exam:    New medications:     Labs to be followed outpatient:     Exam to be followed outpatient:    Hospital course:   70F with PMH of Asthma (diagnosed after 9/11/01 attack), HTN, DM, HLD, L hip arthritis, and Bipolar Disorder was recently admitted for UTI with E. Coli bacteremia (complicated by sepsis, with acute kidney injury, rhabdomyolysis, and transaminitis) and discharged to subacute rehab facility. Patient presented from [facility] rehab 2/21/21 due to SOB, cough, and hypoxia from COVID PNA, after having received Levaquin and Decadron since 2/16. Patient required brief tele stay for HFNC, and subsequently weaned off. LE dopplers were negative for DVT on 2/23. Urine culture grew E. faecium, however antibiotics were note administered as the patient denies dysuria and has remained afebrile. Patient completed 5 day course of Remdesivir and 5 days of Decadron 6mg to complete a 10 day course on 2/25. Patient now satting well on 4L NC.     Problem List/Main Diagnoses:   Acute respiratory failure with hypoxia 2/2 COVID PNA  - Low suspicion for PE as 2/23 LE doppler negative for DVT, low D-dimer and stable supplemental oxygen requirements.  - was treated with 6 days of Levaquin and 8mg decadron at [facility] (2/16-2/21)  - rest of 10 day decadron course completed inpatient with 6mg daily until 2/25  - was treated with 5 days of Remdesivir (2/21-2/25)  - Currently on 4L NC    # Asthma  Patient was last discharged with Duoneb nebulization q4h, but was not previously on inhaler medications.  - continue albuterol 2 puffs q6h PRN  - continue Spiriva daily    # HTN (hypertension)  On home lisinopril 40mg     #HLD  - Continue simvastatin 10 mg QD home med.     # Diabetes mellitus.   Patient was on Onglyza and Metformin in the past. A1c of 7.8 on 1/25/21. Patient was discharged to [facility] rehab on mISS  - continue mISS    # Bipolar disorder.   - Continue home medication, Abilify 20mg    New medications: none    Labs to be followed outpatient: none    Exam to be followed outpatient: none   Hospital course:   70F with PMH of Asthma (diagnosed after 9/11/01 attack), HTN, DM, HLD, L hip arthritis, and Bipolar Disorder was recently admitted for UTI with E. Coli bacteremia (complicated by sepsis, with acute kidney injury, rhabdomyolysis, and transaminitis) and discharged to subacute rehab facility. Patient presented from [facility] rehab 2/21/21 due to SOB, cough, and hypoxia from COVID PNA, after having received Levaquin and Decadron since 2/16. Patient required brief tele stay for HFNC, and subsequently weaned off. LE dopplers were negative for DVT on 2/23. Urine culture grew E. faecium, however antibiotics were note administered as the patient denies dysuria and has remained afebrile. Patient completed 5 day course of Remdesivir and 5 days of Decadron 6mg to complete a 10 day course on 2/25. Patient now satting well on 4L NC.     Problem List/Main Diagnoses:   # Acute respiratory failure with hypoxia 2/2 COVID PNA  - Low suspicion for PE as 2/23 LE doppler negative for DVT, low D-dimer and stable supplemental oxygen requirements.  - was treated with 6 days of Levaquin and 8mg decadron at [facility] (2/16-2/21)  - rest of 10 day decadron course completed inpatient with 6mg daily until 2/25  - was treated with 5 days of Remdesivir (2/21-2/25)  - Currently on 4L NC    # Asthma  Patient was last discharged with Duoneb nebulization q4h, but was not previously on inhaler medications.  - continue albuterol 2 puffs q6h PRN  - continue Spiriva daily    # HTN (hypertension)  On home lisinopril 40mg     #HLD  - Continue simvastatin 10 mg QD home med.     # Diabetes mellitus.   Patient was on Onglyza and Metformin in the past. A1c of 7.8 on 1/25/21. Patient was discharged to [facility] rehab on mISS  - continue mISS    # Bipolar disorder.   - Continue home medication, Abilify 20mg    # Rouleaux formation  Found to have Rouleaux formation on admission  - total protein 6.2, albumin 2.8  - consider outpatient MM workup    New medications: none    Labs to be followed outpatient: none    Exam to be followed outpatient: none   Hospital course:   70F with PMH of Asthma (diagnosed after 9/11/01 attack), HTN, DM, HLD, L hip arthritis, and Bipolar Disorder was recently admitted for UTI with E. Coli bacteremia (complicated by sepsis, with acute kidney injury, rhabdomyolysis, and transaminitis) and discharged to subacute rehab facility. Patient presented from [facility] rehab 2/21/21 due to SOB, cough, and hypoxia from COVID PNA, after having received Levaquin and Decadron since 2/16. Patient required brief tele stay for HFNC, and subsequently weaned off. LE dopplers were negative for DVT on 2/23. Urine culture grew E. faecium, however antibiotics were note administered as the patient denies dysuria and has remained afebrile. Patient completed 5 day course of Remdesivir and 5 days of Decadron 6mg to complete a 10 day course on 2/25. Patient now satting well on 4L NC.     Problem List/Main Diagnoses:   # Acute respiratory failure with hypoxia 2/2 COVID PNA  - Low suspicion for PE as 2/23 LE doppler negative for DVT, low D-dimer and stable supplemental oxygen requirements.  - was treated with 6 days of Levaquin and 8mg decadron at [facility] (2/16-2/21)  - rest of 10 day decadron course completed inpatient with 6mg daily until 2/25  - was treated with 5 days of Remdesivir (2/21-2/25)  - Currently on 4L NC    # Asthma  Patient was last discharged with Duoneb nebulization q4h, but was not previously on inhaler medications.  - continue albuterol 2 puffs q6h PRN  - continue Spiriva daily    # HTN (hypertension)  On home lisinopril 40mg     #HLD  - Continue simvastatin 10 mg QD home med.     # Diabetes mellitus.   Patient was on Onglyza and Metformin in the past. A1c of 7.8 on 1/25/21. Patient was discharged to [facility] rehab on mISS  - continue mISS    # Bipolar disorder.   - Continue home medication, Abilify 20mg    # Rouleaux formation  Found to have Rouleaux formation on admission  - total protein 7.5, albumin 2.8 in Jan 2021  - consider outpatient MM workup    New medications: none    Labs to be followed outpatient: none    Exam to be followed outpatient: none   Hospital course:   70F with PMH of Asthma (diagnosed after 9/11/01 attack), HTN, DM, HLD, L hip arthritis, and Bipolar Disorder was recently admitted for UTI with E. Coli bacteremia (complicated by sepsis, with acute kidney injury, rhabdomyolysis, and transaminitis) and discharged to subacute rehab facility. Patient presented from [facility] rehab 2/21/21 due to SOB, cough, and hypoxia from COVID PNA, after having received Levaquin and Decadron since 2/16. Patient required brief tele stay for HFNC, and subsequently weaned off. LE dopplers were negative for DVT on 2/23. Urine culture grew E. faecium, however antibiotics were note administered as the patient denies dysuria and has remained afebrile. Patient completed 5 day course of Remdesivir and 5 days of Decadron 6mg to complete a 10 day course on 2/25. Patient now satting well on 4L NC.     Problem List/Main Diagnoses:   # Acute respiratory failure with hypoxia 2/2 COVID PNA  - Low suspicion for PE as 2/23 LE doppler negative for DVT, low D-dimer and stable supplemental oxygen requirements.  - was treated with 6 days of Levaquin and 8mg decadron at [facility] (2/16-2/21)  - rest of 10 day decadron course completed inpatient with 6mg daily until 2/25  - was treated with 5 days of Remdesivir (2/21-2/25)  - Currently on 3-4L NC    # Asthma  Patient was last discharged with Duoneb nebulization q4h, but was not previously on inhaler medications.  - continue albuterol 2 puffs q6h PRN  - continue Spiriva daily    # HTN (hypertension)  On home lisinopril 40mg     #HLD  - Continue simvastatin 10 mg QD home med.     # Diabetes mellitus.   Patient was on Onglyza 5mg QD and Metformin 1000 BID in the past. A1c of 7.8 on 1/25/21. Patient was discharged to [facility] rehab on mISS  - c/w home meds    # Bipolar disorder.   - Continue home medication, Abilify 20mg    # Rouleaux formation  Found to have Rouleaux formation on admission  - total protein 7.5, albumin 2.8 in Jan 2021  - consider outpatient MM workup    New medications: none    Labs to be followed outpatient: none    Exam to be followed outpatient: none

## 2021-03-01 NOTE — PROGRESS NOTE ADULT - ASSESSMENT
70F with PMH of Asthma (diagnosed after 9/11/01 attack), HTN, DM, HLD, L hip arthritis, Bipolar Disorder, and recent admission for UTI with E. Coli bacteremia, presented from [facility] due to hypoxia, and was admitted to [facility] for management of COVID infection (+2/21). Patient was transferred to telemetry for HFNC and supplemental oxygen is now decreased to 6L NC. Patient deemed to be stable for transfer to [facility].

## 2021-03-01 NOTE — PROGRESS NOTE ADULT - PROBLEM SELECTOR PLAN 6
Patient was on Onglyza and Metformin in the past. A1c of 7.8 on 1/25/21. Patient was discharged to [facility] rehab on mISS  - continue mISS    #Reflux/Abdominal Gas  -Continue from rehab, prilosec 20 mg QD; continue probiotic; continue simethicone 80 mg qD PRN for gas

## 2021-03-01 NOTE — PROGRESS NOTE ADULT - PROBLEM SELECTOR PLAN 2
#COVID-19 Pneumonia  Patient first symptomatic one week before admission, first confirmed positive on admission 2/21  - was treated with 6 days of Levaquin and 8mg decadron at [facility] (2/16-2/21)  - rest of 10 day decadron course completed inpatient with 6mg daily until 2/25  - was treated with 5 days of Remdesivir (2/21-2/25)  - Currently on 4L NC  - incentive spirometer

## 2021-03-01 NOTE — DISCHARGE NOTE PROVIDER - NSDCFUSCHEDAPPT_GEN_ALL_CORE_FT
LUCAS ADAN ; 03/11/2021 ; [facility] [address] [facility] ([place], [place]) ; 03/11/2021 ; [facility] [address]  [facility] ([place], [place]) ; 03/15/2021 ; [facility] [address]

## 2021-03-01 NOTE — DISCHARGE NOTE PROVIDER - PROVIDER TOKENS
PROVIDER:[TOKEN:[[id number]],FOLLOWUP:[2 weeks]],PROVIDER:[TOKEN:[[id number]],FOLLOWUP:[2 weeks]] PROVIDER:[TOKEN:[[id number]],SCHEDULEDAPPT:[03/11/2021],SCHEDULEDAPPTTIME:[01:30 PM]],PROVIDER:[TOKEN:[[id number]],FOLLOWUP:[2 weeks]] PROVIDER:[TOKEN:[[id number]],SCHEDULEDAPPT:[03/11/2021],SCHEDULEDAPPTTIME:[01:30 PM]],PROVIDER:[TOKEN:[[id number]],SCHEDULEDAPPT:[03/15/2021],SCHEDULEDAPPTTIME:[11:00 AM]]

## 2021-03-02 LAB
ALBUMIN SERPL ELPH-MCNC: 2.6 G/DL — LOW (ref 3.3–5)
ALP SERPL-CCNC: 77 U/L — SIGNIFICANT CHANGE UP (ref 40–120)
ALT FLD-CCNC: 9 U/L — LOW (ref 10–45)
ANION GAP SERPL CALC-SCNC: 6 MMOL/L — SIGNIFICANT CHANGE UP (ref 5–17)
AST SERPL-CCNC: 11 U/L — SIGNIFICANT CHANGE UP (ref 10–40)
BASOPHILS # BLD AUTO: 0.02 K/UL — SIGNIFICANT CHANGE UP (ref 0–0.2)
BASOPHILS NFR BLD AUTO: 0.2 % — SIGNIFICANT CHANGE UP (ref 0–2)
BILIRUB SERPL-MCNC: 0.4 MG/DL — SIGNIFICANT CHANGE UP (ref 0.2–1.2)
BLD GP AB SCN SERPL QL: NEGATIVE — SIGNIFICANT CHANGE UP
BUN SERPL-MCNC: 9 MG/DL — SIGNIFICANT CHANGE UP (ref 7–23)
CALCIUM SERPL-MCNC: 9.3 MG/DL — SIGNIFICANT CHANGE UP (ref 8.4–10.5)
CHLORIDE SERPL-SCNC: 102 MMOL/L — SIGNIFICANT CHANGE UP (ref 96–108)
CO2 SERPL-SCNC: 27 MMOL/L — SIGNIFICANT CHANGE UP (ref 22–31)
CREAT SERPL-MCNC: 0.63 MG/DL — SIGNIFICANT CHANGE UP (ref 0.5–1.3)
CRP SERPL-MCNC: 13 MG/L — HIGH (ref 0–4)
D DIMER BLD IA.RAPID-MCNC: 254 NG/ML DDU — HIGH
EOSINOPHIL # BLD AUTO: 0.23 K/UL — SIGNIFICANT CHANGE UP (ref 0–0.5)
EOSINOPHIL NFR BLD AUTO: 2 % — SIGNIFICANT CHANGE UP (ref 0–6)
GLUCOSE BLDC GLUCOMTR-MCNC: 126 MG/DL — HIGH (ref 70–99)
GLUCOSE BLDC GLUCOMTR-MCNC: 133 MG/DL — HIGH (ref 70–99)
GLUCOSE BLDC GLUCOMTR-MCNC: 204 MG/DL — HIGH (ref 70–99)
GLUCOSE BLDC GLUCOMTR-MCNC: 231 MG/DL — HIGH (ref 70–99)
GLUCOSE SERPL-MCNC: 138 MG/DL — HIGH (ref 70–99)
HCT VFR BLD CALC: 26.2 % — LOW (ref 34.5–45)
HGB BLD-MCNC: 8.4 G/DL — LOW (ref 11.5–15.5)
IMM GRANULOCYTES NFR BLD AUTO: 1.5 % — SIGNIFICANT CHANGE UP (ref 0–1.5)
LYMPHOCYTES # BLD AUTO: 1.2 K/UL — SIGNIFICANT CHANGE UP (ref 1–3.3)
LYMPHOCYTES # BLD AUTO: 10.4 % — LOW (ref 13–44)
MAGNESIUM SERPL-MCNC: 1.8 MG/DL — SIGNIFICANT CHANGE UP (ref 1.6–2.6)
MCHC RBC-ENTMCNC: 28.2 PG — SIGNIFICANT CHANGE UP (ref 27–34)
MCHC RBC-ENTMCNC: 32.1 GM/DL — SIGNIFICANT CHANGE UP (ref 32–36)
MCV RBC AUTO: 87.9 FL — SIGNIFICANT CHANGE UP (ref 80–100)
MONOCYTES # BLD AUTO: 1.02 K/UL — HIGH (ref 0–0.9)
MONOCYTES NFR BLD AUTO: 8.8 % — SIGNIFICANT CHANGE UP (ref 2–14)
NEUTROPHILS # BLD AUTO: 8.93 K/UL — HIGH (ref 1.8–7.4)
NEUTROPHILS NFR BLD AUTO: 77.1 % — HIGH (ref 43–77)
NRBC # BLD: 0 /100 WBCS — SIGNIFICANT CHANGE UP (ref 0–0)
PHOSPHATE SERPL-MCNC: 2.8 MG/DL — SIGNIFICANT CHANGE UP (ref 2.5–4.5)
PLATELET # BLD AUTO: 290 K/UL — SIGNIFICANT CHANGE UP (ref 150–400)
POTASSIUM SERPL-MCNC: 4.7 MMOL/L — SIGNIFICANT CHANGE UP (ref 3.5–5.3)
POTASSIUM SERPL-SCNC: 4.7 MMOL/L — SIGNIFICANT CHANGE UP (ref 3.5–5.3)
PROT SERPL-MCNC: 5.5 G/DL — LOW (ref 6–8.3)
RBC # BLD: 2.98 M/UL — LOW (ref 3.8–5.2)
RBC # FLD: 14.4 % — SIGNIFICANT CHANGE UP (ref 10.3–14.5)
RH IG SCN BLD-IMP: POSITIVE — SIGNIFICANT CHANGE UP
SARS-COV-2 RNA SPEC QL NAA+PROBE: POSITIVE
SODIUM SERPL-SCNC: 135 MMOL/L — SIGNIFICANT CHANGE UP (ref 135–145)
WBC # BLD: 11.57 K/UL — HIGH (ref 3.8–10.5)
WBC # FLD AUTO: 11.57 K/UL — HIGH (ref 3.8–10.5)

## 2021-03-02 PROCEDURE — 99232 SBSQ HOSP IP/OBS MODERATE 35: CPT

## 2021-03-02 RX ORDER — ENOXAPARIN SODIUM 100 MG/ML
40 INJECTION SUBCUTANEOUS
Qty: 0 | Refills: 0 | DISCHARGE
Start: 2021-03-02

## 2021-03-02 RX ORDER — ALBUTEROL 90 UG/1
2 AEROSOL, METERED ORAL
Qty: 0 | Refills: 0 | DISCHARGE
Start: 2021-03-02

## 2021-03-02 RX ORDER — LANOLIN ALCOHOL/MO/W.PET/CERES
1 CREAM (GRAM) TOPICAL
Qty: 0 | Refills: 0 | DISCHARGE
Start: 2021-03-02

## 2021-03-02 RX ADMIN — Medication 5 MILLIGRAM(S): at 23:01

## 2021-03-02 RX ADMIN — Medication 4: at 12:53

## 2021-03-02 RX ADMIN — ENOXAPARIN SODIUM 40 MILLIGRAM(S): 100 INJECTION SUBCUTANEOUS at 06:32

## 2021-03-02 RX ADMIN — Medication 4 UNIT(S): at 18:16

## 2021-03-02 RX ADMIN — Medication 4 UNIT(S): at 12:53

## 2021-03-02 RX ADMIN — ARIPIPRAZOLE 20 MILLIGRAM(S): 15 TABLET ORAL at 12:53

## 2021-03-02 RX ADMIN — SIMVASTATIN 10 MILLIGRAM(S): 20 TABLET, FILM COATED ORAL at 23:01

## 2021-03-02 RX ADMIN — LISINOPRIL 20 MILLIGRAM(S): 2.5 TABLET ORAL at 06:32

## 2021-03-02 RX ADMIN — Medication 4: at 18:15

## 2021-03-02 RX ADMIN — ENOXAPARIN SODIUM 40 MILLIGRAM(S): 100 INJECTION SUBCUTANEOUS at 18:15

## 2021-03-02 RX ADMIN — PANTOPRAZOLE SODIUM 40 MILLIGRAM(S): 20 TABLET, DELAYED RELEASE ORAL at 06:32

## 2021-03-02 NOTE — PROGRESS NOTE ADULT - SUBJECTIVE AND OBJECTIVE BOX
OVERNIGHT EVENTS: OSIRIS    SUBJECTIVE / INTERVAL HPI: Patient seen and examined at bedside. Patient reports feeling better, in no acute distress, denies chest pain, SOB, ab pain, n/v/d.    ROS: 12-point review of system negative except mentioned above.     VITAL SIGNS:  Vital Signs Last 24 Hrs  T(C): 36.7 (02 Mar 2021 09:01), Max: 36.9 (01 Mar 2021 21:23)  T(F): 98 (02 Mar 2021 09:01), Max: 98.4 (01 Mar 2021 21:23)  HR: 70 (02 Mar 2021 09:02) (68 - 80)  BP: 121/68 (02 Mar 2021 09:01) (119/68 - 153/66)  BP(mean): --  RR: 20 (02 Mar 2021 09:02) (18 - 20)  SpO2: 93% (02 Mar 2021 09:02) (91% - 96%)    PHYSICAL EXAM:    General: WDWN  HEENT: NC/AT; PERRL, anicteric sclera; MMM  Neck: supple  Cardiovascular: +S1/S2, RRR  Respiratory: CTA B/L; no W/R/R  Gastrointestinal: soft, NT/ND; +BSx4  Extremities: WWP; no edema, clubbing or cyanosis  Vascular: 2+ radial, DP/PT pulses B/L  Neurological: AAOx3; no focal deficits    MEDICATIONS:  MEDICATIONS  (STANDING):  ARIPiprazole 20 milliGRAM(s) Oral daily  dextrose 40% Gel 15 Gram(s) Oral once  dextrose 5%. 1000 milliLiter(s) (50 mL/Hr) IV Continuous <Continuous>  dextrose 5%. 1000 milliLiter(s) (100 mL/Hr) IV Continuous <Continuous>  dextrose 50% Injectable 25 Gram(s) IV Push once  dextrose 50% Injectable 12.5 Gram(s) IV Push once  dextrose 50% Injectable 25 Gram(s) IV Push once  enoxaparin Injectable 40 milliGRAM(s) SubCutaneous every 12 hours  glucagon  Injectable 1 milliGRAM(s) IntraMuscular once  insulin lispro (ADMELOG) corrective regimen sliding scale   SubCutaneous Before meals and at bedtime  insulin lispro Injectable (ADMELOG) 4 Unit(s) SubCutaneous before lunch  insulin lispro Injectable (ADMELOG) 4 Unit(s) SubCutaneous before dinner  lisinopril 20 milliGRAM(s) Oral daily  melatonin 5 milliGRAM(s) Oral at bedtime  pantoprazole    Tablet 40 milliGRAM(s) Oral before breakfast  simvastatin 10 milliGRAM(s) Oral at bedtime  tiotropium 18 MICROgram(s) Capsule 1 Capsule(s) Inhalation daily    MEDICATIONS  (PRN):  ALBUTerol    90 MICROgram(s) HFA Inhaler 2 Puff(s) Inhalation every 6 hours PRN SOB  simethicone 80 milliGRAM(s) Chew daily PRN Gas      ALLERGIES:  Allergies    No Known Allergies    Intolerances        LABS:                        8.4    11.57 )-----------( 290      ( 02 Mar 2021 06:56 )             26.2     03-02    135  |  102  |  9   ----------------------------<  138<H>  4.7   |  27  |  0.63    Ca    9.3      02 Mar 2021 06:56  Phos  2.8     03-02  Mg     1.8     03-02    TPro  5.5<L>  /  Alb  2.6<L>  /  TBili  0.4  /  DBili  x   /  AST  11  /  ALT  9<L>  /  AlkPhos  77  03-02        CAPILLARY BLOOD GLUCOSE      POCT Blood Glucose.: 126 mg/dL (02 Mar 2021 08:21)      RADIOLOGY & ADDITIONAL TESTS: Reviewed.

## 2021-03-02 NOTE — PROGRESS NOTE ADULT - PROBLEM SELECTOR PLAN 6
Patient was on Onglyza and Metformin in the past. A1c of 7.8 on 1/25/21. Patient was discharged to [facility] rehab on mISS  - continue mISS    #Reflux/Abdominal Gas  -Continue from rehab, prilosec 20 mg QD; continue probiotic; continue simethicone 80 mg qD PRN for gas

## 2021-03-02 NOTE — PROGRESS NOTE ADULT - PROBLEM SELECTOR PLAN 5
Baseline hemoglobin approximately 9. No active signs of bleeding. Ferritin elevated; hemoglobin and transferrin decreased; reticulocytes hypoproliferative      #Elevated INR  INR 1.75  - likely due to apixaban 2.5 mg BID since 2/16 for DVT prophylaxis while at [facility] rehab.  - continue Lovenox for DVT prophylaxis, 40mg BID as BMI >35

## 2021-03-02 NOTE — PROGRESS NOTE ADULT - PROBLEM SELECTOR PLAN 2
#COVID-19 Pneumonia  Patient first symptomatic one week before admission, first confirmed positive on admission 2/21  - was treated with 6 days of Levaquin and 8mg decadron at [facility] (2/16-2/21)  - rest of 10 day decadron course completed inpatient with 6mg daily until 2/25  - was treated with 5 days of Remdesivir (2/21-2/25)  - Currently on 3L NC  - incentive spirometer

## 2021-03-02 NOTE — PROGRESS NOTE ADULT - ASSESSMENT
70F with PMH of Asthma (diagnosed after 9/11/01 attack), HTN, DM, HLD, L hip arthritis, Bipolar Disorder, and recent admission for UTI with E. Coli bacteremia, presented from [facility] due to hypoxia, and was admitted to [facility] for management of COVID infection (+2/21). Patient was transferred to telemetry for HFNC and supplemental oxygen is now decreased to 6L NC. Patient deemed to be stable for transfer to [facility].

## 2021-03-03 LAB
ALBUMIN SERPL ELPH-MCNC: 2.7 G/DL — LOW (ref 3.3–5)
ALP SERPL-CCNC: 76 U/L — SIGNIFICANT CHANGE UP (ref 40–120)
ALT FLD-CCNC: 9 U/L — LOW (ref 10–45)
ANION GAP SERPL CALC-SCNC: 9 MMOL/L — SIGNIFICANT CHANGE UP (ref 5–17)
AST SERPL-CCNC: 16 U/L — SIGNIFICANT CHANGE UP (ref 10–40)
BASOPHILS # BLD AUTO: 0.02 K/UL — SIGNIFICANT CHANGE UP (ref 0–0.2)
BASOPHILS NFR BLD AUTO: 0.2 % — SIGNIFICANT CHANGE UP (ref 0–2)
BILIRUB SERPL-MCNC: 0.4 MG/DL — SIGNIFICANT CHANGE UP (ref 0.2–1.2)
BUN SERPL-MCNC: 8 MG/DL — SIGNIFICANT CHANGE UP (ref 7–23)
CALCIUM SERPL-MCNC: 9.7 MG/DL — SIGNIFICANT CHANGE UP (ref 8.4–10.5)
CHLORIDE SERPL-SCNC: 104 MMOL/L — SIGNIFICANT CHANGE UP (ref 96–108)
CO2 SERPL-SCNC: 25 MMOL/L — SIGNIFICANT CHANGE UP (ref 22–31)
CREAT SERPL-MCNC: 0.75 MG/DL — SIGNIFICANT CHANGE UP (ref 0.5–1.3)
CRP SERPL-MCNC: 7.8 MG/L — HIGH (ref 0–4)
D DIMER BLD IA.RAPID-MCNC: 198 NG/ML DDU — SIGNIFICANT CHANGE UP
EOSINOPHIL # BLD AUTO: 0.19 K/UL — SIGNIFICANT CHANGE UP (ref 0–0.5)
EOSINOPHIL NFR BLD AUTO: 2.1 % — SIGNIFICANT CHANGE UP (ref 0–6)
GLUCOSE BLDC GLUCOMTR-MCNC: 109 MG/DL — HIGH (ref 70–99)
GLUCOSE BLDC GLUCOMTR-MCNC: 129 MG/DL — HIGH (ref 70–99)
GLUCOSE BLDC GLUCOMTR-MCNC: 131 MG/DL — HIGH (ref 70–99)
GLUCOSE BLDC GLUCOMTR-MCNC: 205 MG/DL — HIGH (ref 70–99)
GLUCOSE SERPL-MCNC: 101 MG/DL — HIGH (ref 70–99)
HCT VFR BLD CALC: 26.3 % — LOW (ref 34.5–45)
HGB BLD-MCNC: 8.1 G/DL — LOW (ref 11.5–15.5)
IMM GRANULOCYTES NFR BLD AUTO: 1.5 % — SIGNIFICANT CHANGE UP (ref 0–1.5)
LYMPHOCYTES # BLD AUTO: 1.51 K/UL — SIGNIFICANT CHANGE UP (ref 1–3.3)
LYMPHOCYTES # BLD AUTO: 16.9 % — SIGNIFICANT CHANGE UP (ref 13–44)
MAGNESIUM SERPL-MCNC: 1.8 MG/DL — SIGNIFICANT CHANGE UP (ref 1.6–2.6)
MCHC RBC-ENTMCNC: 28 PG — SIGNIFICANT CHANGE UP (ref 27–34)
MCHC RBC-ENTMCNC: 30.8 GM/DL — LOW (ref 32–36)
MCV RBC AUTO: 91 FL — SIGNIFICANT CHANGE UP (ref 80–100)
MONOCYTES # BLD AUTO: 0.9 K/UL — SIGNIFICANT CHANGE UP (ref 0–0.9)
MONOCYTES NFR BLD AUTO: 10.1 % — SIGNIFICANT CHANGE UP (ref 2–14)
NEUTROPHILS # BLD AUTO: 6.2 K/UL — SIGNIFICANT CHANGE UP (ref 1.8–7.4)
NEUTROPHILS NFR BLD AUTO: 69.2 % — SIGNIFICANT CHANGE UP (ref 43–77)
NRBC # BLD: 0 /100 WBCS — SIGNIFICANT CHANGE UP (ref 0–0)
PHOSPHATE SERPL-MCNC: 3.4 MG/DL — SIGNIFICANT CHANGE UP (ref 2.5–4.5)
PLATELET # BLD AUTO: 302 K/UL — SIGNIFICANT CHANGE UP (ref 150–400)
POTASSIUM SERPL-MCNC: 4.9 MMOL/L — SIGNIFICANT CHANGE UP (ref 3.5–5.3)
POTASSIUM SERPL-SCNC: 4.9 MMOL/L — SIGNIFICANT CHANGE UP (ref 3.5–5.3)
PROT SERPL-MCNC: 5.8 G/DL — LOW (ref 6–8.3)
RBC # BLD: 2.89 M/UL — LOW (ref 3.8–5.2)
RBC # FLD: 14.6 % — HIGH (ref 10.3–14.5)
SODIUM SERPL-SCNC: 138 MMOL/L — SIGNIFICANT CHANGE UP (ref 135–145)
WBC # BLD: 8.95 K/UL — SIGNIFICANT CHANGE UP (ref 3.8–10.5)
WBC # FLD AUTO: 8.95 K/UL — SIGNIFICANT CHANGE UP (ref 3.8–10.5)

## 2021-03-03 PROCEDURE — 99238 HOSP IP/OBS DSCHRG MGMT 30/<: CPT

## 2021-03-03 RX ORDER — MAGNESIUM SULFATE 500 MG/ML
2 VIAL (ML) INJECTION ONCE
Refills: 0 | Status: COMPLETED | OUTPATIENT
Start: 2021-03-03 | End: 2021-03-03

## 2021-03-03 RX ORDER — LISINOPRIL 2.5 MG/1
1 TABLET ORAL
Qty: 0 | Refills: 0 | DISCHARGE
Start: 2021-03-03

## 2021-03-03 RX ORDER — LISINOPRIL 2.5 MG/1
1 TABLET ORAL
Qty: 0 | Refills: 0 | DISCHARGE

## 2021-03-03 RX ADMIN — SIMVASTATIN 10 MILLIGRAM(S): 20 TABLET, FILM COATED ORAL at 21:07

## 2021-03-03 RX ADMIN — Medication 4 UNIT(S): at 12:41

## 2021-03-03 RX ADMIN — PANTOPRAZOLE SODIUM 40 MILLIGRAM(S): 20 TABLET, DELAYED RELEASE ORAL at 07:19

## 2021-03-03 RX ADMIN — TIOTROPIUM BROMIDE 1 CAPSULE(S): 18 CAPSULE ORAL; RESPIRATORY (INHALATION) at 13:41

## 2021-03-03 RX ADMIN — Medication 4 UNIT(S): at 17:44

## 2021-03-03 RX ADMIN — ENOXAPARIN SODIUM 40 MILLIGRAM(S): 100 INJECTION SUBCUTANEOUS at 18:18

## 2021-03-03 RX ADMIN — ARIPIPRAZOLE 20 MILLIGRAM(S): 15 TABLET ORAL at 11:15

## 2021-03-03 RX ADMIN — Medication 5 MILLIGRAM(S): at 21:07

## 2021-03-03 RX ADMIN — Medication 4: at 12:42

## 2021-03-03 RX ADMIN — ENOXAPARIN SODIUM 40 MILLIGRAM(S): 100 INJECTION SUBCUTANEOUS at 07:19

## 2021-03-03 RX ADMIN — LISINOPRIL 20 MILLIGRAM(S): 2.5 TABLET ORAL at 07:20

## 2021-03-03 RX ADMIN — Medication 100 GRAM(S): at 11:15

## 2021-03-03 NOTE — PROGRESS NOTE ADULT - PROBLEM SELECTOR PLAN 2
#COVID-19 Pneumonia  Patient first symptomatic one week before admission, first confirmed positive on admission 2/21  - was treated with 6 days of Levaquin and 8mg decadron at [facility] (2/16-2/21)  - rest of 10 day decadron course completed inpatient with 6mg daily until 2/25  - was treated with 5 days of Remdesivir (2/21-2/25)  - Currently on 3L NC  - incentive spirometer

## 2021-03-03 NOTE — PROGRESS NOTE ADULT - ASSESSMENT
70F with PMH of Asthma (diagnosed after 9/11/01 attack), HTN, DM, HLD, L hip arthritis, Bipolar Disorder, and recent admission for UTI with E. Coli bacteremia, presented from [facility] due to hypoxia, and was admitted to [facility] for management of COVID infection (+2/21). Patient was transferred to telemetry for HFNC and supplemental oxygen is now decreased to 6L NC. Patient deemed to be stable for transfer to [facility].

## 2021-03-03 NOTE — PROGRESS NOTE ADULT - PROBLEM SELECTOR PLAN 6
Patient was on Onglyza and Metformin in the past. A1c of 7.8 on 1/25/21. Patient was discharged to [facility] rehab on mISS  - continue mISS    #Reflux/Abdominal Gas  -Continue from rehab, prilosec 20 mg QD; continue probiotic; continue simethicone 80 mg qD PRN for gas

## 2021-03-03 NOTE — CHART NOTE - NSCHARTNOTEFT_GEN_A_CORE
Admitting Diagnosis:   Patient is a 70y old  Female who presents with a chief complaint of COVID (03 Mar 2021 14:18)    PAST MEDICAL & SURGICAL HISTORY:  HLD (hyperlipidemia)    Bipolar disorder    Arthritis of left hip    Asthma    Diabetes mellitus    HTN (hypertension)    No significant past surgical history    Current Nutrition Order:  Consistent carbohydrate, DASH    PO Intake: Good (%) [ x  ]  Fair (50-75%) [   ] Poor (<25%) [   ]    GI Issues:   Pt denies N/V/D/C at present   Last  3/3    Pain:  No pain noted     Skin Integrity:  No edema noted    Labs:       138  |  104  |  8   ----------------------------<  101<H>  4.9   |  25  |  0.75    Ca    9.7      03 Mar 2021 07:14  Phos  3.4     -  Mg     1.8         TPro  5.8<L>  /  Alb  2.7<L>  /  TBili  0.4  /  DBili  x   /  AST  16  /  ALT  9<L>  /  AlkPhos  76  -    CAPILLARY BLOOD GLUCOSE    POCT Blood Glucose.: 205 mg/dL (03 Mar 2021 12:25)  POCT Blood Glucose.: 131 mg/dL (03 Mar 2021 08:19)  POCT Blood Glucose.: 133 mg/dL (02 Mar 2021 22:06)  POCT Blood Glucose.: 204 mg/dL (02 Mar 2021 17:45)    Medications:  MEDICATIONS  (STANDING):  ARIPiprazole 20 milliGRAM(s) Oral daily  dextrose 40% Gel 15 Gram(s) Oral once  dextrose 5%. 1000 milliLiter(s) (50 mL/Hr) IV Continuous <Continuous>  dextrose 5%. 1000 milliLiter(s) (100 mL/Hr) IV Continuous <Continuous>  dextrose 50% Injectable 25 Gram(s) IV Push once  dextrose 50% Injectable 12.5 Gram(s) IV Push once  dextrose 50% Injectable 25 Gram(s) IV Push once  enoxaparin Injectable 40 milliGRAM(s) SubCutaneous every 12 hours  glucagon  Injectable 1 milliGRAM(s) IntraMuscular once  insulin lispro (ADMELOG) corrective regimen sliding scale   SubCutaneous Before meals and at bedtime  insulin lispro Injectable (ADMELOG) 4 Unit(s) SubCutaneous before lunch  insulin lispro Injectable (ADMELOG) 4 Unit(s) SubCutaneous before dinner  lisinopril 20 milliGRAM(s) Oral daily  melatonin 5 milliGRAM(s) Oral at bedtime  pantoprazole    Tablet 40 milliGRAM(s) Oral before breakfast  simvastatin 10 milliGRAM(s) Oral at bedtime  tiotropium 18 MICROgram(s) Capsule 1 Capsule(s) Inhalation daily    MEDICATIONS  (PRN):  ALBUTerol    90 MICROgram(s) HFA Inhaler 2 Puff(s) Inhalation every 6 hours PRN SOB  simethicone 80 milliGRAM(s) Chew daily PRN Gas    Anthropometric Measurements:    Weight Assessment:  · Height for BMI (FEET)	5 Feet  · Height for BMI (INCHES)	3 Inch(s)  · Height for BMI (CENTIMETERS)	160.02 Centimeter(s)  · Weight for BMI (lbs)	205.9 lb  · Weight for BMI (kg)	93.4 kg  · Body Mass Index	36.4  · Change in Usual Weight Prior to Admission	no  · Usual Weight Prior to Admission (lbs)	207 [place](s)  · Usual Weight Prior to Admission (kg)	93.8 kg  · Ideal Body Weight (lbs)	115.3  · Ideal Body Weight (kg)	52.3    Weight Change: no new wts     Estimated energy needs:   IBW used as pt exceeds 120% IBW (178%). Needs based on [facility] standards of care for older adults, adjusted for increased pro/ kcal needs 2/2 hypermetabolic state. Fluids per team.  25-30kcal/k-1569kcal  1.2-1.5g/k-78gprotein    Subjective:   70F with PMH of Asthma (diagnosed after 01 attack), HTN, DM, HLD, L hip arthritis, Bipolar Disorder, and recent admission for UTI with E. Coli bacteremia, presented from [facility] due to hypoxia, and was admitted to [facility] for management of COVID infection (+). Patient was transferred to telemetry for HFNC and supplemental oxygen is now decreased to 6L NC. Patient deemed to be stable for transfer to [facility].    Pt seen in room, sitting up in bed. Pt reports good appetite, eating >75% of meals. Pt reports enjoying the hospital food. Previously educated on DASH, consistent carbohydrate diet.  Denies N/V/D/C at present-last BM 3/3. No new wts to assess. Please see recs below. Will continue to follow per RD protocol.     Previous Nutrition Diagnosis: Increased nutrient needs RT increased kcal/protein demand AEB hypermetabolic state 2/2 viral infection     Active [ x  ]  Resolved [   ]    If resolved, new PES:     Goal: Meet >75% EER with good tolerance     Recommendations:  1. Continue DASH, consistent carbohydrate diet  2. Trend wts  3. Monitor lytes, glucose, skin   4. Reinforce diet education prn    Education: Previously educated     Risk Level: High [   ] Moderate [ x ] Low [   ]

## 2021-03-03 NOTE — PROGRESS NOTE ADULT - PROBLEM SELECTOR PLAN 5
Baseline hemoglobin approximately 9. No active signs of bleeding. Ferritin elevated; hemoglobin and transferrin decreased; reticulocytes hypoproliferative      #Elevated INR  INR 1.75  - likely due to apixaban 2.5 mg BID since 2/16 for DVT prophylaxis while at [facility] rehab.  - continue Lovenox for DVT prophylaxis, 40mg BID as BMI >35

## 2021-03-03 NOTE — PROGRESS NOTE ADULT - ASSESSMENT
per Internal Medicine    71 yo F with PMH of Asthma (diagnosed after 9/11/01 attack), HTN, DM, HLD, L hip arthritis, Bipolar Disorder, and recent admission for UTI with E. Coli bacteremia, presented from [facility] due to hypoxia, and was admitted to [facility] for management of COVID infection (+2/21). Patient was transferred to telemetry for HFNC and supplemental oxygen is now decreased to 6L NC. Patient deemed to be stable for transfer to [facility].      Problem/Plan - 1:  ·  Problem: Acute respiratory failure with hypoxia.  Plan: IMPROVING     Due COVID-19 as detailed below.  - Low suspicion for PE as 2/23 LE doppler negative for DVT, low D-dimer and stable supplemental oxygen requirements.  - consider starting Lasix 40mg PO if evidence of fluid overload  - COVID-19 management detailed below.     Problem/Plan - 2:  ·  Problem: COVID-19.  Plan: #COVID-19 Pneumonia  Patient first symptomatic one week before admission, first confirmed positive on admission 2/21  - was treated with 6 days of Levaquin and 8mg decadron at [facility] (2/16-2/21)  - rest of 10 day decadron course completed inpatient with 6mg daily until 2/25  - was treated with 5 days of Remdesivir (2/21-2/25)  - Currently on 3L NC  - incentive spirometer.     Problem/Plan - 3:  ·  Problem: Asthma.  Plan: Patient was last discharged with Duoneb nebulization q4h, but was not previously on inhaler medications.  - continue albuterol 2 puffs q6h PRN  - continue Spiriva daily  - Duoneb q6h.     Problem/Plan - 4:  ·  Problem: HTN (hypertension).  Plan: Patient was prescribed lisinopril 40mg in the past and it was stopped on last admission January 2021 due to hypotension and FACUNDO secondary to sepsis.  - continue amlodipine 5mg  - blood pressure currently well controlled    #Lower extremity edema  Patient was prescribed Lasix for LE edema in the past, but it was stopped on last admission for hypotension and FACUNDO in setting of sepsis. Patient has not been diagnosed with CHF.    #HLD  - Continue simvastatin 10 mg QD home med.     Problem/Plan - 5:  ·  Problem: Anemia.  Plan: Baseline hemoglobin approximately 9. No active signs of bleeding. Ferritin elevated; hemoglobin and transferrin decreased; reticulocytes hypoproliferative      #Elevated INR  INR 1.75  - likely due to apixaban 2.5 mg BID since 2/16 for DVT prophylaxis while at [facility] rehab.  - continue Lovenox for DVT prophylaxis, 40mg BID as BMI >35.     Problem/Plan - 6:  Problem: Diabetes mellitus. Plan: Patient was on Onglyza and Metformin in the past. A1c of 7.8 on 1/25/21. Patient was discharged to [facility] rehab on mISS  - continue mISS    #Reflux/Abdominal Gas  -Continue from rehab, prilosec 20 mg QD; continue probiotic; continue simethicone 80 mg qD PRN for gas.    Problem/Plan - 7:  ·  Problem: Bipolar disorder.  Plan: Patient takes 20mg Abilify daily.  - Continue home medication, Abilify 20mg    #Bilateral arm tremors  - as per the patient, the tremors are a side effect of the Abilify  - continue to monitor.     Problem/Plan - 8:  ·  Problem: Arthritis of left hip.  Plan: Stable.     Problem/Plan - 9:  ·  Problem: Nutrition, metabolism, and development symptoms.  Plan: F: None indicated  E: Replete when K<4 or Mg<2  N: DASH/TLC diet  GI prophylaxis: None  DVT: Lovenox 40 mg Q12 hrs.

## 2021-03-03 NOTE — PROGRESS NOTE ADULT - SUBJECTIVE AND OBJECTIVE BOX
OVERNIGHT EVENTS: OSIRIS    SUBJECTIVE / INTERVAL HPI: Patient seen and examined at bedside. Patient reports feeling better, in no acute distress, denies chest pain, SOB, ab pain, n/v/d.    ROS: 12-point review of system negative except mentioned above.     VITAL SIGNS:  Vital Signs Last 24 Hrs  T(C): 36.7 (03 Mar 2021 09:13), Max: 37.1 (02 Mar 2021 20:57)  T(F): 98.1 (03 Mar 2021 09:13), Max: 98.7 (02 Mar 2021 20:57)  HR: 80 (03 Mar 2021 09:13) (67 - 96)  BP: 122/77 (03 Mar 2021 09:13) (116/58 - 122/77)  BP(mean): --  RR: 20 (03 Mar 2021 09:13) (17 - 20)  SpO2: 94% (03 Mar 2021 09:13) (93% - 98%)    PHYSICAL EXAM:    General: WDWN  HEENT: NC/AT; PERRL, anicteric sclera; MMM  Neck: supple  Cardiovascular: +S1/S2, RRR  Respiratory: CTA B/L; no W/R/R  Gastrointestinal: soft, NT/ND; +BSx4  Extremities: WWP; no edema, clubbing or cyanosis  Vascular: 2+ radial, DP/PT pulses B/L  Neurological: AAOx3; no focal deficits    MEDICATIONS:  MEDICATIONS  (STANDING):  ARIPiprazole 20 milliGRAM(s) Oral daily  dextrose 40% Gel 15 Gram(s) Oral once  dextrose 5%. 1000 milliLiter(s) (50 mL/Hr) IV Continuous <Continuous>  dextrose 5%. 1000 milliLiter(s) (100 mL/Hr) IV Continuous <Continuous>  dextrose 50% Injectable 25 Gram(s) IV Push once  dextrose 50% Injectable 12.5 Gram(s) IV Push once  dextrose 50% Injectable 25 Gram(s) IV Push once  enoxaparin Injectable 40 milliGRAM(s) SubCutaneous every 12 hours  glucagon  Injectable 1 milliGRAM(s) IntraMuscular once  insulin lispro (ADMELOG) corrective regimen sliding scale   SubCutaneous Before meals and at bedtime  insulin lispro Injectable (ADMELOG) 4 Unit(s) SubCutaneous before lunch  insulin lispro Injectable (ADMELOG) 4 Unit(s) SubCutaneous before dinner  lisinopril 20 milliGRAM(s) Oral daily  melatonin 5 milliGRAM(s) Oral at bedtime  pantoprazole    Tablet 40 milliGRAM(s) Oral before breakfast  simvastatin 10 milliGRAM(s) Oral at bedtime  tiotropium 18 MICROgram(s) Capsule 1 Capsule(s) Inhalation daily    MEDICATIONS  (PRN):  ALBUTerol    90 MICROgram(s) HFA Inhaler 2 Puff(s) Inhalation every 6 hours PRN SOB  simethicone 80 milliGRAM(s) Chew daily PRN Gas      ALLERGIES:  Allergies    No Known Allergies    Intolerances        LABS:                        8.1    8.95  )-----------( 302      ( 03 Mar 2021 06:58 )             26.3     03-03    138  |  104  |  8   ----------------------------<  101<H>  4.9   |  25  |  0.75    Ca    9.7      03 Mar 2021 07:14  Phos  3.4     03-03  Mg     1.8     03-03    TPro  5.8<L>  /  Alb  2.7<L>  /  TBili  0.4  /  DBili  x   /  AST  16  /  ALT  9<L>  /  AlkPhos  76  03-03        CAPILLARY BLOOD GLUCOSE      POCT Blood Glucose.: 131 mg/dL (03 Mar 2021 08:19)      RADIOLOGY & ADDITIONAL TESTS: Reviewed.

## 2021-03-03 NOTE — PROGRESS NOTE ADULT - SUBJECTIVE AND OBJECTIVE BOX
Physical Medicine and Rehabilitation Progress Note:    Patient is a 70y old  Female who presents with a chief complaint of COVID (03 Mar 2021 12:17)      HPI:  69 yo F with past medical history of  Asthma (Dx after 9/11 attack) HTN, DM, HLD, L hip arthritis, Bipolar Disorder recently admitted for UTI with E. Coli bacteremia (complicated by sepsis, with acute kidney injury, rhabdomyolysis, and transaminitis, dc'd to subacute rehab facility, now presenting from [facility] rehab due to SOB and hypoxia. Pt states she has been having SOB and mild cough starting a week ago (with some nighttime awakenings, gasping for air) and was told she had "viral PNA". She was treated decadron 8mg (started on 2/16/21) and levaquin (finished today). Pt reports minimal to no improvement in her symptoms and was noted to by hypoxic on vitals this AM. Pt was placed on NRB with improvement to 93%. Pt states that when she does not have supplemental oxygen she feels like is gasping for air. States since her symptoms started her asthma has flared as well, requiring nebulizer treatments which do help. Right now denies any chest tightness or need for nebulizers. Denies any nausea, vomiting, diarrhea, abdominal pain, back pain, or flank pain. Pt states she does have tremors as a side affect from her abilify, and when she began to felt sick her tremors worsened.    ED Course  Vitals: T 98.9, HR 86, /80, RR 22, Spo2 85% on RA  Labs: Notable for WBC 12.46, Hb 9.2, lymphocyte % 4.7, INR 1.75, , Lactate 1.3 (<2), procal 0.06, BNP 2034, VBG 7.49, Pc02 40, Po2 44, Hc03 30  Imaging: CXR mild cardiomegaly. Bilateral infiltrates. Degenerative changes thoracic spine  Received tyl 650 x1.   ICU initially consulted for hypoxia as pt was put on NRB. Was assessed, found to be in no respiratory distress, saturating 92-94%, with decision that COVID icu/tele monitoring not needed at this time.     ROS:  Otherwise negative, except as specified in HPI.  PMH:  PAST MEDICAL & SURGICAL HISTORY:  HLD (hyperlipidemia)    Bipolar disorder  Arthritis of left hip  Asthma  Diabetes mellitus  HTN (hypertension)  No significant past surgical history    ALLERGIES:  Allergies  No Known Allergies  Intolerances    MEDICATIONS:  Home Medications:  ARIPiprazole 20 mg oral tablet: 1 tab(s) orally once a day (25 Jan 2021 11:01)  insulin lispro 100 units/mL injectable solution: Sliding scale coverage three times a day with meals and before bedtime   Sugar 150-200: 2 units  201-250: 4 units   251-300: 6 units  301-350: 8 units (29 Jan 2021 16:06)  ipratropium-albuterol 0.5 mg-2.5 mg/3 mLinhalation solution: 3 milliliter(s) inhaled every 4 hours (29 Jan 2021 16:06)  pantoprazole 40 mg oral delayed release tablet: 1 tab(s) orally once a day (before a meal) (29 Jan 2021 16:06)  rosuvastatin 5 mg oral tablet: 1 tab(s) orally once a day (25 Jan 2021 11:01)  simethicone 80 mg oral tablet, chewable: 1 tab(s) orally once a day, As needed, Gas (29 Jan 2021 16:06)  sodium bicarbonate 650 mg oral tablet: 1 tab(s) orally every 12 hours (29 Jan 2021 16:06) (21 Feb 2021 16:11)                            8.1    8.95  )-----------( 302      ( 03 Mar 2021 06:58 )             26.3       03-03    138  |  104  |  8   ----------------------------<  101<H>  4.9   |  25  |  0.75    Ca    9.7      03 Mar 2021 07:14  Phos  3.4     03-03  Mg     1.8     03-03    TPro  5.8<L>  /  Alb  2.7<L>  /  TBili  0.4  /  DBili  x   /  AST  16  /  ALT  9<L>  /  AlkPhos  76  03-03    Vital Signs Last 24 Hrs  T(C): 36.7 (03 Mar 2021 09:13), Max: 37.1 (02 Mar 2021 20:57)  T(F): 98.1 (03 Mar 2021 09:13), Max: 98.7 (02 Mar 2021 20:57)  HR: 80 (03 Mar 2021 09:13) (67 - 96)  BP: 122/77 (03 Mar 2021 09:13) (116/58 - 122/77)  BP(mean): --  RR: 20 (03 Mar 2021 09:13) (17 - 20)  SpO2: 94% (03 Mar 2021 09:13) (93% - 98%)    MEDICATIONS  (STANDING):  ARIPiprazole 20 milliGRAM(s) Oral daily  dextrose 40% Gel 15 Gram(s) Oral once  dextrose 5%. 1000 milliLiter(s) (50 mL/Hr) IV Continuous <Continuous>  dextrose 5%. 1000 milliLiter(s) (100 mL/Hr) IV Continuous <Continuous>  dextrose 50% Injectable 25 Gram(s) IV Push once  dextrose 50% Injectable 12.5 Gram(s) IV Push once  dextrose 50% Injectable 25 Gram(s) IV Push once  enoxaparin Injectable 40 milliGRAM(s) SubCutaneous every 12 hours  glucagon  Injectable 1 milliGRAM(s) IntraMuscular once  insulin lispro (ADMELOG) corrective regimen sliding scale   SubCutaneous Before meals and at bedtime  insulin lispro Injectable (ADMELOG) 4 Unit(s) SubCutaneous before lunch  insulin lispro Injectable (ADMELOG) 4 Unit(s) SubCutaneous before dinner  lisinopril 20 milliGRAM(s) Oral daily  melatonin 5 milliGRAM(s) Oral at bedtime  pantoprazole    Tablet 40 milliGRAM(s) Oral before breakfast  simvastatin 10 milliGRAM(s) Oral at bedtime  tiotropium 18 MICROgram(s) Capsule 1 Capsule(s) Inhalation daily    MEDICATIONS  (PRN):  ALBUTerol    90 MICROgram(s) HFA Inhaler 2 Puff(s) Inhalation every 6 hours PRN SOB  simethicone 80 milliGRAM(s) Chew daily PRN Gas    Currently Undergoing Physical/ Occupational Therapy at bedside.    Functional Status Assessment:   3/2/2021      Cognitive/Perceptual/Neuro  Cognitive/Neuro/Behavioral [WDL Definition: Alert; opens eyes spontaneously; arouses to voice or touch; oriented x 4; follows commands; speech spontaneous, logical; purposeful motor response; behavior appropriate to situation]: WDL  Level of Consciousness: alert  Orientation: oriented x 4  Speech: clear;  spontaneous;  logical  Mood/Behavior: behavior appropriate to situation    Language Assistance  Preferred Language to Address Healthcare Preferred Language to Address Healthcare: English    Therapeutic Interventions      Bed Mobility  Bed Mobility Training Rehab Potential: good, to achieve stated therapy goals  Bed Mobility Training Rolling/Turning: independent;  bed rails  Bed Mobility Training Scooting: minimum assist (75% patient effort);  1 person assist  Bed Mobility Training Sit-to-Supine: NT - pt. was left sitting in chair.   Bed Mobility Training Supine-to-Sit: minimum assist (75% patient effort);  1 person assist;  verbal cues;  bed rails  Bed Mobility Training Limitations: decreased strength    Sit-Stand Transfer Training  Sit-to-Stand Transfer Training Rehab Potential: good, to achieve stated therapy goals  Transfer Training Sit-to-Stand Transfer: moderate assist (50% patient effort);  minimum assist (75% patient effort);  2 person assist;  weight-bearing as tolerated   rolling walker  Transfer Training Stand-to-Sit Transfer: minimum assist (75% patient effort);  1 person assist;  verbal cues;  weight-bearing as tolerated   rolling walker  Sit-to-Stand Transfer Training Transfer Safety Analysis: decreased weight-shifting ability;  impaired balance;  decreased strength    Gait Training  Gait Training Rehab Potential: good, to achieve stated therapy goals  Gait Training: minimum assist (75% patient effort);  contact guard;  1 person + 1 person to manage equipment;  weight-bearing as tolerated   rolling walker;  20 feet;  x2  Gait Analysis: decreased latoya;  decreased strength;  impaired balance;  cognitive, decreased safety awareness  Type of Rest Type of Rest: sitting  Duration of Rest Duration of Rest: 3 min     Therapeutic Exercise  Therapeutic Exercise Rehab Effort: good  Therapeutic Exercise Detail: LAQ with 3sec isometric hold x 10 reps           PM&R Impression: as above    Current Disposition Plan :    subacute rehab placement

## 2021-03-04 ENCOUNTER — TRANSCRIPTION ENCOUNTER (OUTPATIENT)
Age: 71
End: 2021-03-04

## 2021-03-04 VITALS
TEMPERATURE: 99 F | OXYGEN SATURATION: 95 % | DIASTOLIC BLOOD PRESSURE: 74 MMHG | HEART RATE: 69 BPM | RESPIRATION RATE: 20 BRPM | SYSTOLIC BLOOD PRESSURE: 124 MMHG

## 2021-03-04 LAB
ALBUMIN SERPL ELPH-MCNC: 2.7 G/DL — LOW (ref 3.3–5)
ALP SERPL-CCNC: 68 U/L — SIGNIFICANT CHANGE UP (ref 40–120)
ALT FLD-CCNC: 9 U/L — LOW (ref 10–45)
ANION GAP SERPL CALC-SCNC: 8 MMOL/L — SIGNIFICANT CHANGE UP (ref 5–17)
AST SERPL-CCNC: 13 U/L — SIGNIFICANT CHANGE UP (ref 10–40)
BASOPHILS # BLD AUTO: 0.02 K/UL — SIGNIFICANT CHANGE UP (ref 0–0.2)
BASOPHILS NFR BLD AUTO: 0.2 % — SIGNIFICANT CHANGE UP (ref 0–2)
BILIRUB SERPL-MCNC: 0.3 MG/DL — SIGNIFICANT CHANGE UP (ref 0.2–1.2)
BUN SERPL-MCNC: 9 MG/DL — SIGNIFICANT CHANGE UP (ref 7–23)
CALCIUM SERPL-MCNC: 10 MG/DL — SIGNIFICANT CHANGE UP (ref 8.4–10.5)
CHLORIDE SERPL-SCNC: 103 MMOL/L — SIGNIFICANT CHANGE UP (ref 96–108)
CO2 SERPL-SCNC: 25 MMOL/L — SIGNIFICANT CHANGE UP (ref 22–31)
CREAT SERPL-MCNC: 0.81 MG/DL — SIGNIFICANT CHANGE UP (ref 0.5–1.3)
CRP SERPL-MCNC: 4.7 MG/L — HIGH (ref 0–4)
D DIMER BLD IA.RAPID-MCNC: 170 NG/ML DDU — SIGNIFICANT CHANGE UP
EOSINOPHIL # BLD AUTO: 0.31 K/UL — SIGNIFICANT CHANGE UP (ref 0–0.5)
EOSINOPHIL NFR BLD AUTO: 3.7 % — SIGNIFICANT CHANGE UP (ref 0–6)
GLUCOSE BLDC GLUCOMTR-MCNC: 121 MG/DL — HIGH (ref 70–99)
GLUCOSE BLDC GLUCOMTR-MCNC: 165 MG/DL — HIGH (ref 70–99)
GLUCOSE BLDC GLUCOMTR-MCNC: 220 MG/DL — HIGH (ref 70–99)
GLUCOSE SERPL-MCNC: 123 MG/DL — HIGH (ref 70–99)
HCT VFR BLD CALC: 27 % — LOW (ref 34.5–45)
HGB BLD-MCNC: 8.5 G/DL — LOW (ref 11.5–15.5)
IMM GRANULOCYTES NFR BLD AUTO: 1.3 % — SIGNIFICANT CHANGE UP (ref 0–1.5)
LYMPHOCYTES # BLD AUTO: 1.41 K/UL — SIGNIFICANT CHANGE UP (ref 1–3.3)
LYMPHOCYTES # BLD AUTO: 16.7 % — SIGNIFICANT CHANGE UP (ref 13–44)
MAGNESIUM SERPL-MCNC: 1.8 MG/DL — SIGNIFICANT CHANGE UP (ref 1.6–2.6)
MCHC RBC-ENTMCNC: 28 PG — SIGNIFICANT CHANGE UP (ref 27–34)
MCHC RBC-ENTMCNC: 31.5 GM/DL — LOW (ref 32–36)
MCV RBC AUTO: 88.8 FL — SIGNIFICANT CHANGE UP (ref 80–100)
MONOCYTES # BLD AUTO: 0.83 K/UL — SIGNIFICANT CHANGE UP (ref 0–0.9)
MONOCYTES NFR BLD AUTO: 9.8 % — SIGNIFICANT CHANGE UP (ref 2–14)
NEUTROPHILS # BLD AUTO: 5.75 K/UL — SIGNIFICANT CHANGE UP (ref 1.8–7.4)
NEUTROPHILS NFR BLD AUTO: 68.3 % — SIGNIFICANT CHANGE UP (ref 43–77)
NRBC # BLD: 0 /100 WBCS — SIGNIFICANT CHANGE UP (ref 0–0)
PHOSPHATE SERPL-MCNC: 3.6 MG/DL — SIGNIFICANT CHANGE UP (ref 2.5–4.5)
PLATELET # BLD AUTO: 286 K/UL — SIGNIFICANT CHANGE UP (ref 150–400)
POTASSIUM SERPL-MCNC: 4.8 MMOL/L — SIGNIFICANT CHANGE UP (ref 3.5–5.3)
POTASSIUM SERPL-SCNC: 4.8 MMOL/L — SIGNIFICANT CHANGE UP (ref 3.5–5.3)
PROT SERPL-MCNC: 5.8 G/DL — LOW (ref 6–8.3)
RBC # BLD: 3.04 M/UL — LOW (ref 3.8–5.2)
RBC # FLD: 14.8 % — HIGH (ref 10.3–14.5)
SODIUM SERPL-SCNC: 136 MMOL/L — SIGNIFICANT CHANGE UP (ref 135–145)
WBC # BLD: 8.43 K/UL — SIGNIFICANT CHANGE UP (ref 3.8–10.5)
WBC # FLD AUTO: 8.43 K/UL — SIGNIFICANT CHANGE UP (ref 3.8–10.5)

## 2021-03-04 PROCEDURE — 99238 HOSP IP/OBS DSCHRG MGMT 30/<: CPT

## 2021-03-04 RX ORDER — ENOXAPARIN SODIUM 100 MG/ML
40 INJECTION SUBCUTANEOUS
Qty: 0 | Refills: 0 | DISCHARGE
Start: 2021-03-04

## 2021-03-04 RX ORDER — METFORMIN HYDROCHLORIDE 850 MG/1
1 TABLET ORAL
Qty: 0 | Refills: 0 | DISCHARGE

## 2021-03-04 RX ADMIN — PANTOPRAZOLE SODIUM 40 MILLIGRAM(S): 20 TABLET, DELAYED RELEASE ORAL at 06:35

## 2021-03-04 RX ADMIN — Medication 2: at 16:57

## 2021-03-04 RX ADMIN — Medication 4 UNIT(S): at 16:57

## 2021-03-04 RX ADMIN — ENOXAPARIN SODIUM 40 MILLIGRAM(S): 100 INJECTION SUBCUTANEOUS at 06:35

## 2021-03-04 RX ADMIN — TIOTROPIUM BROMIDE 1 CAPSULE(S): 18 CAPSULE ORAL; RESPIRATORY (INHALATION) at 11:23

## 2021-03-04 RX ADMIN — ARIPIPRAZOLE 20 MILLIGRAM(S): 15 TABLET ORAL at 11:23

## 2021-03-04 RX ADMIN — LISINOPRIL 20 MILLIGRAM(S): 2.5 TABLET ORAL at 06:35

## 2021-03-04 RX ADMIN — Medication 4: at 12:10

## 2021-03-04 NOTE — PROGRESS NOTE ADULT - PROBLEM SELECTOR PROBLEM 1
Acute respiratory failure with hypoxia
Sepsis
Acute respiratory failure with hypoxia
Sepsis
Sepsis
Acute respiratory failure with hypoxia
Acute respiratory failure with hypoxia

## 2021-03-04 NOTE — PROGRESS NOTE ADULT - ASSESSMENT
70F with PMH of Asthma (diagnosed after 9/11/01 attack), HTN, DM, HLD, L hip arthritis, Bipolar Disorder, and recent admission for UTI with E. Coli bacteremia, presented from [facility] due to hypoxia, and was admitted to [facility] for management of COVID infection (+2/21). Patient was transferred to telemetry for HFNC and supplemental oxygen is now decreased to 6L NC. Patient deemed to be stable for transfer to [facility].

## 2021-03-04 NOTE — PROGRESS NOTE ADULT - PROVIDER SPECIALTY LIST ADULT
Rehab Medicine
Internal Medicine

## 2021-03-04 NOTE — PROGRESS NOTE ADULT - SUBJECTIVE AND OBJECTIVE BOX
OVERNIGHT EVENTS: OSIRIS    SUBJECTIVE / INTERVAL HPI: Patient seen and examined at bedside. Patient reports feeling better, is no acute distress, denies chest pain, SOB, ab pain, n/v/d.    ROS: 12-point review of system negative except mentioned above.     VITAL SIGNS:  Vital Signs Last 24 Hrs  T(C): 36.4 (04 Mar 2021 05:51), Max: 36.8 (03 Mar 2021 16:42)  T(F): 97.5 (04 Mar 2021 05:51), Max: 98.2 (03 Mar 2021 16:42)  HR: 61 (04 Mar 2021 05:51) (61 - 80)  BP: 128/78 (04 Mar 2021 05:51) (112/63 - 142/84)  BP(mean): --  RR: 20 (04 Mar 2021 05:51) (19 - 20)  SpO2: 97% (04 Mar 2021 05:51) (94% - 97%)    PHYSICAL EXAM:    General: WDWN  HEENT: NC/AT; PERRL, anicteric sclera; MMM  Neck: supple  Cardiovascular: +S1/S2, RRR  Respiratory: CTA B/L; no W/R/R  Gastrointestinal: soft, NT/ND; +BSx4  Extremities: WWP; no edema, clubbing or cyanosis  Vascular: 2+ radial, DP/PT pulses B/L  Neurological: AAOx3; no focal deficits    MEDICATIONS:  MEDICATIONS  (STANDING):  ARIPiprazole 20 milliGRAM(s) Oral daily  dextrose 40% Gel 15 Gram(s) Oral once  dextrose 5%. 1000 milliLiter(s) (50 mL/Hr) IV Continuous <Continuous>  dextrose 5%. 1000 milliLiter(s) (100 mL/Hr) IV Continuous <Continuous>  dextrose 50% Injectable 25 Gram(s) IV Push once  dextrose 50% Injectable 12.5 Gram(s) IV Push once  dextrose 50% Injectable 25 Gram(s) IV Push once  enoxaparin Injectable 40 milliGRAM(s) SubCutaneous every 12 hours  glucagon  Injectable 1 milliGRAM(s) IntraMuscular once  insulin lispro (ADMELOG) corrective regimen sliding scale   SubCutaneous Before meals and at bedtime  insulin lispro Injectable (ADMELOG) 4 Unit(s) SubCutaneous before lunch  insulin lispro Injectable (ADMELOG) 4 Unit(s) SubCutaneous before dinner  lisinopril 20 milliGRAM(s) Oral daily  melatonin 5 milliGRAM(s) Oral at bedtime  pantoprazole    Tablet 40 milliGRAM(s) Oral before breakfast  simvastatin 10 milliGRAM(s) Oral at bedtime  tiotropium 18 MICROgram(s) Capsule 1 Capsule(s) Inhalation daily    MEDICATIONS  (PRN):  ALBUTerol    90 MICROgram(s) HFA Inhaler 2 Puff(s) Inhalation every 6 hours PRN SOB  simethicone 80 milliGRAM(s) Chew daily PRN Gas      ALLERGIES:  Allergies    No Known Allergies    Intolerances        LABS:                        8.5    8.43  )-----------( 286      ( 04 Mar 2021 06:26 )             27.0     03-04    136  |  103  |  9   ----------------------------<  123<H>  4.8   |  25  |  0.81    Ca    10.0      04 Mar 2021 06:26  Phos  3.6     03-04  Mg     1.8     03-04    TPro  5.8<L>  /  Alb  2.7<L>  /  TBili  0.3  /  DBili  x   /  AST  13  /  ALT  9<L>  /  AlkPhos  68  03-04        CAPILLARY BLOOD GLUCOSE      POCT Blood Glucose.: 121 mg/dL (04 Mar 2021 08:21)      RADIOLOGY & ADDITIONAL TESTS: Reviewed.

## 2021-03-04 NOTE — PROGRESS NOTE ADULT - PROBLEM SELECTOR PLAN 5
Baseline hemoglobin approximately 9. No active signs of bleeding. Ferritin elevated; hemoglobin and transferrin decreased; reticulocytes hypoproliferative      #Elevated INR  INR 1.75  - likely due to apixaban 2.5 mg BID since 2/16 for DVT prophylaxis while at [facility] rehab.  - continue Lovenox for DVT prophylaxis, 40mg BID as BMI >35

## 2021-03-04 NOTE — DISCHARGE NOTE NURSING/CASE MANAGEMENT/SOCIAL WORK - PATIENT PORTAL LINK FT
You can access the FollowMyHealth Patient Portal offered by [facility] by registering at the following website: [email or web address]. By joining [facility]’s FollowMyHealth portal, you will also be able to view your health information using other applications (apps) compatible with our system.

## 2021-03-04 NOTE — PROGRESS NOTE ADULT - NSHPATTENDINGPLANDISCUSS_GEN_ALL_CORE
patient, housestaff
Diana
Joseluis
daniel
Ethan
pul and IM
resident
pul and IM
pul and IM
patient, housestaff

## 2021-03-04 NOTE — PROGRESS NOTE ADULT - ATTENDING COMMENTS
70F with PMH of Asthma, HTN, DM, Bipolar Disorder, and recent admission for UTI with E. Coli bacteremia, presented due to Hypoxia and management  AHRF due to covid and initially transferred to telemetry for HFNC and downgraded to 4 uris after pt was better. Pt is medically cleared to [facility] awaiting bed
I evaluated this 68-year-old lady with a history of diabetes and asthma.  She was admitted this morning with hypoxemic respiratory failure.  She needed 4 L of oxygen via nasal cannula to saturate to 93 to 95%.  At night she required high flow with 50 L/min of flow at 40% FiO2.  She Was diagnosed with COVID-19 infection in the nursing home.  The patient has received Decadron (end- treatment 2/25) and remdesivir (end treatment 2/25.  During examination, the patient was on high flow nasal cannula at 40 L/min requiring 50% FiO2.  SPO2 at rest on room air was 88 to 93%.  She was alert and oriented.  She had coarse tremors specially in the upper extremities but also in the lower extremities.  This is most likely due to Abilify reviewed her chest x-ray from 2/22.  She has bilateral reticular opacities in the subpleural area right greater than left.  There is no evidence of pulmonary vascular congestion.  Agree with the treatment and management.  She does not require antibiotics.  Will continue dexamethasone and remdesivir till 2/25
I evaluated this 68-year-old lady with a history of diabetes and asthma.  She was admitted this morning with hypoxemic respiratory failure.  She needed 4 L of oxygen via nasal cannula to saturate to 93 to 95%.  At night she required high flow with 50 L/min of flow at 40% FiO2.  She Was diagnosed with COVID-19 infection in the nursing home.  The patient has received Decadron (end- treatment 2/25) and remdesivir (end treatment 2/25.  During examination, the patient was on  nasal cannula at 6L/min.  SPO2 at rest on room air was 92%.  She was alert and oriented.  She had coarse tremors specially in the upper extremities but also in the lower extremities.  This is most likely due to Abilify reviewed her chest x-ray from 2/22.  She has bilateral reticular opacities in the subpleural area, right greater than left.  CXR is unchanged from prior CXRs.  There is no evidence of pulmonary vascular congestion.  Agree with the treatment and management.  She does not require antibiotics.    Given the density of opacities on the chest x-ray, it is possible that the patient may require supplemental oxygen for some more time,   Also I had a detailed discussion with the patient regarding the use of CPAP at night.  She has consented.  We will use CPAP of 8 cm of water tonight
Agree with exam.  Worsening hypoxic respiratory failure, transferred to [facility] for HFO2.  Continue remdesivir, steroids, BD. Titrating HFO2.
I evaluated this 68-year-old lady with a history of diabetes and asthma.  She was admitted this morning with hypoxemic respiratory failure.  She needed 4 L of oxygen via nasal cannula to saturate to 93 to 95%.  At night she required high flow with 50 L/min of flow at 40% FiO2.  She was diagnosed with COVID-19 infection in the nursing home.  The patient has received Decadron (end- treatment 2/25) and remdesivir (end treatment 2/25.  During examination, the patient was on  nasal cannula at 6L/min.  SPO2 at rest on room air was 94%.  On RA, her SpO2 was 88%. She was alert and oriented.  She had coarse tremors specially in the upper extremities but also in the lower extremities.  This is most likely due to Abilify. I reviewed her chest x-ray from 2/22.  She has bilateral reticular opacities in the subpleural area, right greater than left.  CXR is unchanged from prior CXRs.  There is no evidence of pulmonary vascular congestion.  Agree with the treatment and management.  She does not require antibiotics.    Given the density of opacities on the chest x-ray, it is possible that the patient may require supplemental oxygen for some more time,   Also I had a detailed discussion with the patient regarding the use of CPAP at night.  She has consented.  We will use CPAP of 8 cm of water every night. I suspect she has SHAHANA.
Pt. seen and examined by me earlier today; I have read Dr. Cox's note, I agree w/ her findings and plan of care as documented; Pt. w/ increasing O2 requirements; Pulm-CCM consulted, Pt. will be transferred to telemetry unit for high-flow O2
I evaluated this 68-year-old lady with a history of diabetes and asthma.  She was admitted this morning with hypoxemic respiratory failure.  She needed 4 L of oxygen via nasal cannula to saturate to 93 to 95%.  At night she required high flow with 50 L/min of flow at 40% FiO2.  She Was diagnosed with COVID-19 infection in the nursing home.  The patient has received Decadron (end- treatment 2/25) and remdesivir (end treatment 2/25.  During examination, the patient was on high flow nasal cannula at 40 L/min requiring 50% FiO2.  SPO2 at rest on room air was 95%.  She was alert and oriented.  She had coarse tremors specially in the upper extremities but also in the lower extremities.  This is most likely due to Abilify reviewed her chest x-ray from 2/22.  She has bilateral reticular opacities in the subpleural area, right greater than left. CXR is unchanged from prior CXR.  There is no evidence of pulmonary vascular congestion.  Agree with the treatment and management.  She does not require antibiotics.  Will continue dexamethasone and remdesivir till 2/25. MORALES index is favorable.  Given the density of opacities on the chest x-ray, it is possible that the patient may require supplemental oxygen for some more time, via high flow nasal cannula.  You are also monitoring her inflammatory markers
Patient discussed with resident team and plan of care reviewed. I have personally reviewed all pertinent labs and imaging and performed an independent history and physical. Resident note personally reviewed, and I agree with above resident note with the following additions:    70YOF with history of asthma, HTN, HLD, DM2 (a1c 7.8% Jan 2021), obesity (BMI 36.5) recent admission here for severe sepsis 2/2 E. coli urinary tract infection and rhabdomyolysis complicated by FACUNDO/ATN briefly requiring HD (did not require HD on discharge), discharged to [facility] admitted from [facility] for acute hypoxemic respiratory failure 2/2 COVID19 pneumonia. Hospital course notable for increase in level of care due to worsening hypoxemia (required HFNC). Finished 5 days of remdesivir and 10 days of dexamethasone with improvement (had started dexamethasone at [facility]). Stable for transfer to [facility] today with improving O2 requirements.    Doing well, symptoms improving in good spirits. CRP increased today though unclear significance, will monitor closely. Recent DVT studies negative. Will continue to work on weaning O2. PT rec back to [facility] when medically ready.
I evaluated this 68-year-old lady with a history of diabetes and asthma.  She was admitted this morning with hypoxemic respiratory failure.  She needed 4 L of oxygen via nasal cannula to saturate to 93 to 95%.  At night she required high flow with 50 L/min of flow at 40% FiO2.  She Was diagnosed with COVID-19 infection in the nursing home.  The patient has received Decadron (end- treatment 2/25) and remdesivir (end treatment 2/25.  During examination, the patient was on high flow nasal cannula at 40 L/min requiring 40% FiO2.  SPO2 at rest on room air was 95%.  She was alert and oriented.  She had coarse tremors specially in the upper extremities but also in the lower extremities.  This is most likely due to Abilify reviewed her chest x-ray from 2/22.  She has bilateral reticular opacities in the subpleural area right greater than left. CXR is unchanged from prior CXR.  There is no evidence of pulmonary vascular congestion.  Agree with the treatment and management.  She does not require antibiotics.  Will continue dexamethasone and remdesivir till 2/25. MORALES index is 9.5

## 2021-03-04 NOTE — PROGRESS NOTE ADULT - PROBLEM SELECTOR PLAN 6
Patient was on Onglyza and Metformin in the past. A1c of 7.8 on 1/25/21. Patient was discharged to [facility] rehab on mISS  - continue mISS    #Reflux/Abdominal Gas  -Continue from rehab, prilosec 20 mg QD; continue probiotic; continue simethicone 80 mg qD PRN for gas

## 2021-03-04 NOTE — PROGRESS NOTE ADULT - PROBLEM SELECTOR PLAN 2
#COVID-19 Pneumonia  Patient first symptomatic one week before admission, first confirmed positive on admission 2/21  - was treated with 6 days of Levaquin and 8mg decadron at [facility] (2/16-2/21)  - rest of 10 day decadron course completed inpatient with 6mg daily until 2/25  - was treated with 5 days of Remdesivir (2/21-2/25)  - Currently on 3L NC  - incentive spirometer

## 2021-03-08 ENCOUNTER — NON-APPOINTMENT (OUTPATIENT)
Age: 71
End: 2021-03-08

## 2021-03-11 ENCOUNTER — APPOINTMENT (OUTPATIENT)
Dept: INTERNAL MEDICINE | Facility: CLINIC | Age: 71
End: 2021-03-11

## 2021-03-11 DIAGNOSIS — M16.12 UNILATERAL PRIMARY OSTEOARTHRITIS, LEFT HIP: ICD-10-CM

## 2021-03-11 DIAGNOSIS — F31.9 BIPOLAR DISORDER, UNSPECIFIED: ICD-10-CM

## 2021-03-11 DIAGNOSIS — R60.0 LOCALIZED EDEMA: ICD-10-CM

## 2021-03-11 DIAGNOSIS — E87.3 ALKALOSIS: ICD-10-CM

## 2021-03-11 DIAGNOSIS — R71.8 OTHER ABNORMALITY OF RED BLOOD CELLS: ICD-10-CM

## 2021-03-11 DIAGNOSIS — J96.01 ACUTE RESPIRATORY FAILURE WITH HYPOXIA: ICD-10-CM

## 2021-03-11 DIAGNOSIS — K21.9 GASTRO-ESOPHAGEAL REFLUX DISEASE WITHOUT ESOPHAGITIS: ICD-10-CM

## 2021-03-11 DIAGNOSIS — R53.1 WEAKNESS: ICD-10-CM

## 2021-03-11 DIAGNOSIS — E78.5 HYPERLIPIDEMIA, UNSPECIFIED: ICD-10-CM

## 2021-03-11 DIAGNOSIS — A41.89 OTHER SPECIFIED SEPSIS: ICD-10-CM

## 2021-03-11 DIAGNOSIS — I10 ESSENTIAL (PRIMARY) HYPERTENSION: ICD-10-CM

## 2021-03-11 DIAGNOSIS — U07.1 COVID-19: ICD-10-CM

## 2021-03-11 DIAGNOSIS — E11.9 TYPE 2 DIABETES MELLITUS WITHOUT COMPLICATIONS: ICD-10-CM

## 2021-03-11 DIAGNOSIS — E66.9 OBESITY, UNSPECIFIED: ICD-10-CM

## 2021-03-11 DIAGNOSIS — J45.909 UNSPECIFIED ASTHMA, UNCOMPLICATED: ICD-10-CM

## 2021-03-11 DIAGNOSIS — J12.82 PNEUMONIA DUE TO CORONAVIRUS DISEASE 2019: ICD-10-CM

## 2021-03-15 ENCOUNTER — APPOINTMENT (OUTPATIENT)
Dept: PULMONOLOGY | Facility: CLINIC | Age: 71
End: 2021-03-15

## 2021-03-16 PROCEDURE — 94660 CPAP INITIATION&MGMT: CPT

## 2021-03-16 PROCEDURE — 71045 X-RAY EXAM CHEST 1 VIEW: CPT

## 2021-03-16 PROCEDURE — 93970 EXTREMITY STUDY: CPT

## 2021-03-16 PROCEDURE — 83615 LACTATE (LD) (LDH) ENZYME: CPT

## 2021-03-16 PROCEDURE — 83605 ASSAY OF LACTIC ACID: CPT

## 2021-03-16 PROCEDURE — 87040 BLOOD CULTURE FOR BACTERIA: CPT

## 2021-03-16 PROCEDURE — 97530 THERAPEUTIC ACTIVITIES: CPT

## 2021-03-16 PROCEDURE — 82728 ASSAY OF FERRITIN: CPT

## 2021-03-16 PROCEDURE — 80053 COMPREHEN METABOLIC PANEL: CPT

## 2021-03-16 PROCEDURE — 84145 PROCALCITONIN (PCT): CPT

## 2021-03-16 PROCEDURE — 85730 THROMBOPLASTIN TIME PARTIAL: CPT

## 2021-03-16 PROCEDURE — 87086 URINE CULTURE/COLONY COUNT: CPT

## 2021-03-16 PROCEDURE — 99285 EMERGENCY DEPT VISIT HI MDM: CPT | Mod: 25

## 2021-03-16 PROCEDURE — 36415 COLL VENOUS BLD VENIPUNCTURE: CPT

## 2021-03-16 PROCEDURE — 85610 PROTHROMBIN TIME: CPT

## 2021-03-16 PROCEDURE — 86140 C-REACTIVE PROTEIN: CPT

## 2021-03-16 PROCEDURE — 83550 IRON BINDING TEST: CPT

## 2021-03-16 PROCEDURE — 81001 URINALYSIS AUTO W/SCOPE: CPT

## 2021-03-16 PROCEDURE — 86850 RBC ANTIBODY SCREEN: CPT

## 2021-03-16 PROCEDURE — 82962 GLUCOSE BLOOD TEST: CPT

## 2021-03-16 PROCEDURE — 83540 ASSAY OF IRON: CPT

## 2021-03-16 PROCEDURE — 86900 BLOOD TYPING SEROLOGIC ABO: CPT

## 2021-03-16 PROCEDURE — 84100 ASSAY OF PHOSPHORUS: CPT

## 2021-03-16 PROCEDURE — 93005 ELECTROCARDIOGRAM TRACING: CPT

## 2021-03-16 PROCEDURE — 82803 BLOOD GASES ANY COMBINATION: CPT

## 2021-03-16 PROCEDURE — 97116 GAIT TRAINING THERAPY: CPT

## 2021-03-16 PROCEDURE — U0003: CPT

## 2021-03-16 PROCEDURE — 87186 SC STD MICRODIL/AGAR DIL: CPT

## 2021-03-16 PROCEDURE — 94640 AIRWAY INHALATION TREATMENT: CPT

## 2021-03-16 PROCEDURE — 83880 ASSAY OF NATRIURETIC PEPTIDE: CPT

## 2021-03-16 PROCEDURE — 83735 ASSAY OF MAGNESIUM: CPT

## 2021-03-16 PROCEDURE — 85379 FIBRIN DEGRADATION QUANT: CPT

## 2021-03-16 PROCEDURE — 85025 COMPLETE CBC W/AUTO DIFF WBC: CPT

## 2021-03-16 PROCEDURE — 84466 ASSAY OF TRANSFERRIN: CPT

## 2021-03-16 PROCEDURE — 85045 AUTOMATED RETICULOCYTE COUNT: CPT

## 2021-03-16 PROCEDURE — 97110 THERAPEUTIC EXERCISES: CPT

## 2021-03-16 PROCEDURE — 86901 BLOOD TYPING SEROLOGIC RH(D): CPT

## 2021-03-16 PROCEDURE — 97163 PT EVAL HIGH COMPLEX 45 MIN: CPT

## 2021-03-16 PROCEDURE — U0005: CPT

## 2021-04-07 ENCOUNTER — NON-APPOINTMENT (OUTPATIENT)
Age: 71
End: 2021-04-07

## 2021-04-12 ENCOUNTER — APPOINTMENT (OUTPATIENT)
Dept: INTERNAL MEDICINE | Facility: CLINIC | Age: 71
End: 2021-04-12
Payer: MEDICARE

## 2021-04-12 ENCOUNTER — LABORATORY RESULT (OUTPATIENT)
Age: 71
End: 2021-04-12

## 2021-04-12 VITALS
OXYGEN SATURATION: 98 % | SYSTOLIC BLOOD PRESSURE: 150 MMHG | HEART RATE: 74 BPM | DIASTOLIC BLOOD PRESSURE: 82 MMHG | TEMPERATURE: 98.3 F

## 2021-04-12 DIAGNOSIS — N39.0 SEPSIS, UNSPECIFIED ORGANISM: ICD-10-CM

## 2021-04-12 DIAGNOSIS — Z79.899 OTHER LONG TERM (CURRENT) DRUG THERAPY: ICD-10-CM

## 2021-04-12 DIAGNOSIS — Z86.16 PERSONAL HISTORY OF COVID-19: ICD-10-CM

## 2021-04-12 DIAGNOSIS — A41.9 SEPSIS, UNSPECIFIED ORGANISM: ICD-10-CM

## 2021-04-12 PROCEDURE — 99072 ADDL SUPL MATRL&STAF TM PHE: CPT

## 2021-04-12 PROCEDURE — 99214 OFFICE O/P EST MOD 30 MIN: CPT | Mod: 25

## 2021-04-12 PROCEDURE — 36415 COLL VENOUS BLD VENIPUNCTURE: CPT

## 2021-04-12 RX ORDER — ATORVASTATIN CALCIUM 10 MG/1
10 TABLET, FILM COATED ORAL
Qty: 30 | Refills: 0 | Status: DISCONTINUED | COMMUNITY
Start: 2021-04-01

## 2021-04-13 LAB
25(OH)D3 SERPL-MCNC: 26.5 NG/ML
ALBUMIN SERPL ELPH-MCNC: 4.5 G/DL
ALP BLD-CCNC: 79 U/L
ALT SERPL-CCNC: 13 U/L
ANION GAP SERPL CALC-SCNC: 14 MMOL/L
APPEARANCE: ABNORMAL
AST SERPL-CCNC: 20 U/L
BASOPHILS # BLD AUTO: 0.09 K/UL
BASOPHILS NFR BLD AUTO: 0.7 %
BILIRUB SERPL-MCNC: 0.3 MG/DL
BILIRUBIN URINE: NEGATIVE
BLOOD URINE: NEGATIVE
BUN SERPL-MCNC: 18 MG/DL
CALCIUM SERPL-MCNC: 11.1 MG/DL
CHLORIDE SERPL-SCNC: 103 MMOL/L
CO2 SERPL-SCNC: 18 MMOL/L
COLOR: NORMAL
CREAT SERPL-MCNC: 0.73 MG/DL
EOSINOPHIL # BLD AUTO: 0.27 K/UL
EOSINOPHIL NFR BLD AUTO: 2.1 %
ESTIMATED AVERAGE GLUCOSE: 134 MG/DL
FERRITIN SERPL-MCNC: 63 NG/ML
GLUCOSE QUALITATIVE U: NEGATIVE
GLUCOSE SERPL-MCNC: 144 MG/DL
HBA1C MFR BLD HPLC: 6.3 %
HCT VFR BLD CALC: 35.2 %
HGB BLD-MCNC: 11.5 G/DL
IMM GRANULOCYTES NFR BLD AUTO: 0.6 %
IRON SERPL-MCNC: 41 UG/DL
KETONES URINE: NEGATIVE
LEUKOCYTE ESTERASE URINE: ABNORMAL
LYMPHOCYTES # BLD AUTO: 2.06 K/UL
LYMPHOCYTES NFR BLD AUTO: 16.3 %
MAN DIFF?: NORMAL
MCHC RBC-ENTMCNC: 30.3 PG
MCHC RBC-ENTMCNC: 32.7 GM/DL
MCV RBC AUTO: 92.9 FL
MONOCYTES # BLD AUTO: 0.92 K/UL
MONOCYTES NFR BLD AUTO: 7.3 %
NEUTROPHILS # BLD AUTO: 9.22 K/UL
NEUTROPHILS NFR BLD AUTO: 73 %
NITRITE URINE: NEGATIVE
PH URINE: 5
PLATELET # BLD AUTO: 261 K/UL
POTASSIUM SERPL-SCNC: 5.1 MMOL/L
PROT SERPL-MCNC: 7.1 G/DL
PROTEIN URINE: ABNORMAL
RBC # BLD: 3.79 M/UL
RBC # FLD: 16.1 %
SODIUM SERPL-SCNC: 136 MMOL/L
SPECIFIC GRAVITY URINE: 1.01
UROBILINOGEN URINE: NORMAL
WBC # FLD AUTO: 12.64 K/UL

## 2021-04-15 LAB — BACTERIA UR CULT: ABNORMAL

## 2021-04-20 ENCOUNTER — APPOINTMENT (OUTPATIENT)
Dept: INTERNAL MEDICINE | Facility: CLINIC | Age: 71
End: 2021-04-20

## 2021-05-05 ENCOUNTER — APPOINTMENT (OUTPATIENT)
Dept: INTERNAL MEDICINE | Facility: CLINIC | Age: 71
End: 2021-05-05
Payer: MEDICARE

## 2021-05-05 PROCEDURE — 99442: CPT

## 2021-06-15 ENCOUNTER — LABORATORY RESULT (OUTPATIENT)
Age: 71
End: 2021-06-15

## 2021-06-15 ENCOUNTER — APPOINTMENT (OUTPATIENT)
Dept: INTERNAL MEDICINE | Facility: CLINIC | Age: 71
End: 2021-06-15
Payer: MEDICARE

## 2021-06-15 VITALS
TEMPERATURE: 97.9 F | DIASTOLIC BLOOD PRESSURE: 80 MMHG | SYSTOLIC BLOOD PRESSURE: 140 MMHG | WEIGHT: 195 LBS | HEART RATE: 75 BPM | OXYGEN SATURATION: 97 % | BODY MASS INDEX: 34.54 KG/M2

## 2021-06-15 DIAGNOSIS — Z12.11 ENCOUNTER FOR SCREENING FOR MALIGNANT NEOPLASM OF COLON: ICD-10-CM

## 2021-06-15 DIAGNOSIS — R60.0 LOCALIZED EDEMA: ICD-10-CM

## 2021-06-15 PROCEDURE — G0447 BEHAVIOR COUNSEL OBESITY 15M: CPT

## 2021-06-15 PROCEDURE — 99397 PER PM REEVAL EST PAT 65+ YR: CPT | Mod: 25

## 2021-06-15 PROCEDURE — 99213 OFFICE O/P EST LOW 20 MIN: CPT | Mod: 25

## 2021-06-15 PROCEDURE — 36415 COLL VENOUS BLD VENIPUNCTURE: CPT

## 2021-06-16 LAB
ALBUMIN SERPL ELPH-MCNC: 4.4 G/DL
ANION GAP SERPL CALC-SCNC: 13 MMOL/L
APPEARANCE: CLEAR
BILIRUBIN URINE: NEGATIVE
BLOOD URINE: NEGATIVE
BUN SERPL-MCNC: 15 MG/DL
CALCIUM SERPL-MCNC: 10.7 MG/DL
CALCIUM SERPL-MCNC: 10.7 MG/DL
CHLORIDE SERPL-SCNC: 104 MMOL/L
CO2 SERPL-SCNC: 22 MMOL/L
COLOR: NORMAL
CREAT SERPL-MCNC: 0.69 MG/DL
GLUCOSE QUALITATIVE U: NEGATIVE
GLUCOSE SERPL-MCNC: 160 MG/DL
KETONES URINE: NEGATIVE
LEUKOCYTE ESTERASE URINE: NEGATIVE
NITRITE URINE: NEGATIVE
PARATHYROID HORMONE INTACT: 47 PG/ML
PH URINE: 5
PHOSPHATE SERPL-MCNC: 3.6 MG/DL
POTASSIUM SERPL-SCNC: 4.7 MMOL/L
PROTEIN URINE: ABNORMAL
SODIUM SERPL-SCNC: 139 MMOL/L
SPECIFIC GRAVITY URINE: 1.01
UROBILINOGEN URINE: NORMAL

## 2021-06-23 LAB — BACTERIA UR CULT: ABNORMAL

## 2021-08-19 ENCOUNTER — APPOINTMENT (OUTPATIENT)
Dept: INTERNAL MEDICINE | Facility: CLINIC | Age: 71
End: 2021-08-19
Payer: MEDICARE

## 2021-08-19 DIAGNOSIS — R11.0 NAUSEA: ICD-10-CM

## 2021-08-19 PROCEDURE — 99442: CPT

## 2021-08-31 NOTE — ED PROVIDER NOTE - CONSULTING PHYSICIAN
Today we discussed   - reducing computer time to no more than 1 hour outside of school work   - as a family stopping soda and juice and other sweet beverages. Sleep Apnea in Children: Care Instructions  Overview     Sleep apnea means that your child has trouble breathing during sleep. It can be mild to severe, based on the number of times an hour that it happens. It's called obstructive sleep apnea (SAMANTA) when blocked or narrowed airways in the nose, mouth, or throat keep a child from getting normal airflow. Being overweight or having swollen tonsils or adenoids are common causes of sleep apnea. Your child's airway also can be blocked when the throat muscles and tongue relax during sleep. Your child may have symptoms such as:  · Snoring (sometimes with pauses in breathing). · Tossing and turning during sleep. · Feeling sleepy during the day. · Wetting the bed. · Having headaches. · Having trouble paying attention. · Having behavioral problems. The doctor will give your child a physical exam. Your doctor may suggest sleep tests to find out if your child has sleep apnea. Surgery to remove the tonsils and adenoids is a common treatment for sleep apnea. In some cases, lifestyle changes can help treat the problem. For children who are overweight, weight loss can help. Sleep apnea may also be treated at home using a machine that helps keep tissues in the throat from blocking the airway. If sleeping on their back makes your child's sleep apnea worse, or if other treatments don't work, your doctor may suggest devices that help change your child's sleeping position. Follow-up care is a key part of your child's treatment and safety. Be sure to make and go to all appointments, and call your doctor if your child is having problems. It's also a good idea to know your child's test results and keep a list of the medicines your child takes. How can you care for your child at home?   Do not smoke around your child. Nehemiah Magana can make sleep apnea worse. Treat breathing problems, such as a stuffy nose, that are caused by a cold or allergies. Help your child stay at a healthy weight. Choose healthy foods for meals, and encourage daily exercise. When should you call for help? Watch closely for changes in your child's health, and be sure to contact your doctor if:    · Your child does not get better as expected.     · Your child has new or worse symptoms of sleep apnea. These may include snoring, feeling sleepy during the day, headaches, or trouble paying attention.     · Your child is still sleepy during the day, and it affects their daily life. Where can you learn more? Go to http://www.gray.com/  Enter S296 in the search box to learn more about \"Sleep Apnea in Children: Care Instructions. \"  Current as of: October 26, 2020               Content Version: 12.8  © 4929-2832 [employer]wise, Incorporated. Care instructions adapted under license by [employer] (which disclaims liability or warranty for this information). If you have questions about a medical condition or this instruction, always ask your healthcare professional. [place] [address] any warranty or liability for your use of this information. Dr. Ashish Marlow: [phone]

## 2021-09-28 ENCOUNTER — APPOINTMENT (OUTPATIENT)
Dept: INTERNAL MEDICINE | Facility: CLINIC | Age: 71
End: 2021-09-28

## 2021-10-27 ENCOUNTER — APPOINTMENT (OUTPATIENT)
Dept: INTERNAL MEDICINE | Facility: CLINIC | Age: 71
End: 2021-10-27
Payer: MEDICARE

## 2021-10-27 VITALS
DIASTOLIC BLOOD PRESSURE: 70 MMHG | TEMPERATURE: 98.1 F | HEART RATE: 76 BPM | BODY MASS INDEX: 35.26 KG/M2 | SYSTOLIC BLOOD PRESSURE: 140 MMHG | WEIGHT: 199 LBS | HEIGHT: 63 IN | OXYGEN SATURATION: 96 %

## 2021-10-27 PROCEDURE — 99214 OFFICE O/P EST MOD 30 MIN: CPT

## 2021-10-27 RX ORDER — NITROFURANTOIN (MONOHYDRATE/MACROCRYSTALS) 25; 75 MG/1; MG/1
100 CAPSULE ORAL
Qty: 14 | Refills: 1 | Status: DISCONTINUED | COMMUNITY
Start: 2019-11-07 | End: 2021-10-27

## 2021-10-27 RX ORDER — AMPICILLIN 500 MG/1
500 CAPSULE ORAL
Qty: 21 | Refills: 0 | Status: DISCONTINUED | COMMUNITY
Start: 2021-06-23 | End: 2021-10-27

## 2021-10-27 RX ORDER — ACETAMINOPHEN 325 MG/1
TABLET, FILM COATED ORAL
Refills: 0 | Status: DISCONTINUED | COMMUNITY
End: 2021-10-27

## 2021-11-05 ENCOUNTER — APPOINTMENT (OUTPATIENT)
Dept: GASTROENTEROLOGY | Facility: CLINIC | Age: 71
End: 2021-11-05

## 2021-11-23 NOTE — H&P ADULT - ATTENDING COMMENTS
Detail Level: Generalized Hide Additional Notes?: No reviewed pertinent data , h&p  patient seen and examined overnight   pt obese, in NAD, MMM, s1s2, lungs cta b/l; abd soft/nt/nd    1. 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        rest of  plan as above

## 2021-12-13 ENCOUNTER — RX RENEWAL (OUTPATIENT)
Age: 71
End: 2021-12-13

## 2021-12-24 NOTE — PROCEDURE NOTE - NSINFORMCONSENT_GEN_A_CORE
Problem: Adult Inpatient Plan of Care  Goal: Absence of Hospital-Acquired Illness or Injury  Intervention: Prevent Skin Injury     Problem: Risk for Delirium  Goal: Optimal Coping  Outcome: Improving     Problem: Pain Acute  Goal: Acceptable Pain Control and Functional Ability  Outcome: Improving  Intervention: Develop Pain Management Plan  Recent Flowsheet Documentation    Problem: Urinary Retention  Goal: Effective Urinary Elimination  Outcome: Improving     Tele reading- Sinus Rhythm with BBB. Right leg covered with ace wrap, PRN Oxycodone was given for pain which was helpful. Bladder scan 340. Incontinent of extra large urine once. External Cath in place. VSS. Repositioned to promote skin integrity. Hemoglobin this shift at 2200- 8.3 g/dl.    Benefits, risks, and possible complications of procedure explained to patient/caregiver who verbalized understanding and gave written consent.

## 2022-01-21 ENCOUNTER — APPOINTMENT (OUTPATIENT)
Dept: INTERNAL MEDICINE | Facility: CLINIC | Age: 72
End: 2022-01-21
Payer: MEDICARE

## 2022-01-21 DIAGNOSIS — Z11.59 ENCOUNTER FOR SCREENING FOR OTHER VIRAL DISEASES: ICD-10-CM

## 2022-01-21 PROCEDURE — 99442: CPT

## 2022-01-25 LAB — SARS-COV-2 N GENE NPH QL NAA+PROBE: NOT DETECTED

## 2022-01-27 ENCOUNTER — APPOINTMENT (OUTPATIENT)
Dept: INTERNAL MEDICINE | Facility: CLINIC | Age: 72
End: 2022-01-27
Payer: MEDICARE

## 2022-01-27 ENCOUNTER — LABORATORY RESULT (OUTPATIENT)
Age: 72
End: 2022-01-27

## 2022-01-27 VITALS
TEMPERATURE: 97.2 F | SYSTOLIC BLOOD PRESSURE: 160 MMHG | HEART RATE: 89 BPM | HEIGHT: 63 IN | BODY MASS INDEX: 35.97 KG/M2 | DIASTOLIC BLOOD PRESSURE: 77 MMHG | WEIGHT: 203 LBS | OXYGEN SATURATION: 98 %

## 2022-01-27 DIAGNOSIS — R82.81 PYURIA: ICD-10-CM

## 2022-01-27 DIAGNOSIS — R09.81 NASAL CONGESTION: ICD-10-CM

## 2022-01-27 PROCEDURE — 36415 COLL VENOUS BLD VENIPUNCTURE: CPT

## 2022-01-27 PROCEDURE — 90471 IMMUNIZATION ADMIN: CPT

## 2022-01-27 PROCEDURE — 90750 HZV VACC RECOMBINANT IM: CPT

## 2022-01-27 PROCEDURE — 99214 OFFICE O/P EST MOD 30 MIN: CPT | Mod: 25

## 2022-01-28 LAB
APPEARANCE: CLEAR
BASOPHILS # BLD AUTO: 0.08 K/UL
BASOPHILS NFR BLD AUTO: 0.8 %
BILIRUBIN URINE: NEGATIVE
BLOOD URINE: NEGATIVE
COLOR: YELLOW
EOSINOPHIL # BLD AUTO: 0.36 K/UL
EOSINOPHIL NFR BLD AUTO: 3.4 %
ESTIMATED AVERAGE GLUCOSE: 183 MG/DL
GLUCOSE QUALITATIVE U: NEGATIVE
HBA1C MFR BLD HPLC: 8 %
HCT VFR BLD CALC: 39.6 %
HGB BLD-MCNC: 12.3 G/DL
IMM GRANULOCYTES NFR BLD AUTO: 0.4 %
KETONES URINE: NEGATIVE
LEUKOCYTE ESTERASE URINE: NEGATIVE
LYMPHOCYTES # BLD AUTO: 2.01 K/UL
LYMPHOCYTES NFR BLD AUTO: 18.9 %
MAN DIFF?: NORMAL
MCHC RBC-ENTMCNC: 29.9 PG
MCHC RBC-ENTMCNC: 31.1 GM/DL
MCV RBC AUTO: 96.1 FL
MONOCYTES # BLD AUTO: 0.77 K/UL
MONOCYTES NFR BLD AUTO: 7.2 %
NEUTROPHILS # BLD AUTO: 7.37 K/UL
NEUTROPHILS NFR BLD AUTO: 69.3 %
NITRITE URINE: NEGATIVE
PH URINE: 5.5
PLATELET # BLD AUTO: 189 K/UL
PROTEIN URINE: ABNORMAL
RBC # BLD: 4.12 M/UL
RBC # FLD: 14.4 %
SPECIFIC GRAVITY URINE: 1.02
UROBILINOGEN URINE: NORMAL
WBC # FLD AUTO: 10.63 K/UL

## 2022-01-31 LAB — BACTERIA UR CULT: NORMAL

## 2022-03-21 ENCOUNTER — RX RENEWAL (OUTPATIENT)
Age: 72
End: 2022-03-21

## 2022-04-11 PROBLEM — Z11.59 SCREENING FOR VIRAL DISEASE: Status: ACTIVE | Noted: 2020-06-29

## 2022-05-24 ENCOUNTER — LABORATORY RESULT (OUTPATIENT)
Age: 72
End: 2022-05-24

## 2022-05-24 ENCOUNTER — APPOINTMENT (OUTPATIENT)
Dept: INTERNAL MEDICINE | Facility: CLINIC | Age: 72
End: 2022-05-24
Payer: MEDICARE

## 2022-05-24 VITALS
OXYGEN SATURATION: 99 % | DIASTOLIC BLOOD PRESSURE: 68 MMHG | WEIGHT: 210 LBS | TEMPERATURE: 97.6 F | HEIGHT: 63 IN | BODY MASS INDEX: 37.21 KG/M2 | SYSTOLIC BLOOD PRESSURE: 149 MMHG | HEART RATE: 81 BPM

## 2022-05-24 PROCEDURE — 90750 HZV VACC RECOMBINANT IM: CPT

## 2022-05-24 PROCEDURE — 90471 IMMUNIZATION ADMIN: CPT

## 2022-05-24 PROCEDURE — 99214 OFFICE O/P EST MOD 30 MIN: CPT | Mod: 25

## 2022-05-24 PROCEDURE — 36415 COLL VENOUS BLD VENIPUNCTURE: CPT

## 2022-05-24 RX ORDER — AMOXICILLIN 500 MG/1
500 TABLET, FILM COATED ORAL 3 TIMES DAILY
Qty: 21 | Refills: 1 | Status: DISCONTINUED | COMMUNITY
Start: 2022-01-21 | End: 2022-05-24

## 2022-05-24 RX ORDER — IPRATROPIUM BROMIDE 17 UG/1
17 AEROSOL, METERED RESPIRATORY (INHALATION)
Qty: 12 | Refills: 0 | Status: DISCONTINUED | COMMUNITY
Start: 2021-04-01 | End: 2022-05-24

## 2022-05-26 RX ORDER — EMPAGLIFLOZIN 10 MG/1
10 TABLET, FILM COATED ORAL DAILY
Qty: 90 | Refills: 3 | Status: DISCONTINUED | COMMUNITY
Start: 2022-05-24 | End: 2022-05-26

## 2022-06-29 ENCOUNTER — RX RENEWAL (OUTPATIENT)
Age: 72
End: 2022-06-29

## 2022-07-05 ENCOUNTER — RX RENEWAL (OUTPATIENT)
Age: 72
End: 2022-07-05

## 2022-07-05 RX ORDER — FLASH GLUCOSE SENSOR
KIT MISCELLANEOUS
Qty: 1 | Refills: 3 | Status: ACTIVE | COMMUNITY
Start: 2022-06-30 | End: 1900-01-01

## 2022-07-15 RX ORDER — FLASH GLUCOSE SENSOR
KIT MISCELLANEOUS
Qty: 1 | Refills: 0 | Status: ACTIVE | COMMUNITY
Start: 2022-07-15 | End: 1900-01-01

## 2022-07-29 NOTE — ED PROVIDER NOTE - PATIENT PORTAL LINK FT
9
You can access the FollowMyHealth Patient Portal offered by [facility] by registering at the following website: [email or web address]. By joining [employer]’s FollowMyHealth portal, you will also be able to view your health information using other applications (apps) compatible with our system.

## 2022-08-02 ENCOUNTER — APPOINTMENT (OUTPATIENT)
Dept: INTERNAL MEDICINE | Facility: CLINIC | Age: 72
End: 2022-08-02

## 2022-08-02 VITALS
HEIGHT: 63 IN | DIASTOLIC BLOOD PRESSURE: 82 MMHG | WEIGHT: 194 LBS | SYSTOLIC BLOOD PRESSURE: 149 MMHG | BODY MASS INDEX: 34.38 KG/M2 | TEMPERATURE: 98.4 F | HEART RATE: 71 BPM | OXYGEN SATURATION: 96 %

## 2022-08-02 PROCEDURE — 99213 OFFICE O/P EST LOW 20 MIN: CPT | Mod: 25

## 2022-08-02 PROCEDURE — 36415 COLL VENOUS BLD VENIPUNCTURE: CPT

## 2022-08-02 RX ORDER — SAXAGLIPTIN 5 MG/1
5 TABLET, FILM COATED ORAL
Qty: 90 | Refills: 3 | Status: DISCONTINUED | COMMUNITY
Start: 2019-02-13 | End: 2022-08-02

## 2022-08-03 LAB
ANION GAP SERPL CALC-SCNC: 15 MMOL/L
BASOPHILS # BLD AUTO: 0.09 K/UL
BASOPHILS NFR BLD AUTO: 0.7 %
BUN SERPL-MCNC: 24 MG/DL
CALCIUM SERPL-MCNC: 10.8 MG/DL
CHLORIDE SERPL-SCNC: 104 MMOL/L
CO2 SERPL-SCNC: 21 MMOL/L
CREAT SERPL-MCNC: 0.83 MG/DL
EGFR: 74 ML/MIN/1.73M2
EOSINOPHIL # BLD AUTO: 0.52 K/UL
EOSINOPHIL NFR BLD AUTO: 4.1 %
ESTIMATED AVERAGE GLUCOSE: 160 MG/DL
GLUCOSE SERPL-MCNC: 106 MG/DL
HBA1C MFR BLD HPLC: 7.2 %
HCT VFR BLD CALC: 41.1 %
HGB BLD-MCNC: 12.5 G/DL
IMM GRANULOCYTES NFR BLD AUTO: 0.5 %
LYMPHOCYTES # BLD AUTO: 2.84 K/UL
LYMPHOCYTES NFR BLD AUTO: 22.6 %
MAN DIFF?: NORMAL
MCHC RBC-ENTMCNC: 28.3 PG
MCHC RBC-ENTMCNC: 30.4 GM/DL
MCV RBC AUTO: 93 FL
MONOCYTES # BLD AUTO: 0.99 K/UL
MONOCYTES NFR BLD AUTO: 7.9 %
NEUTROPHILS # BLD AUTO: 8.07 K/UL
NEUTROPHILS NFR BLD AUTO: 64.2 %
PLATELET # BLD AUTO: 210 K/UL
POTASSIUM SERPL-SCNC: 4.3 MMOL/L
RBC # BLD: 4.42 M/UL
RBC # FLD: 14.5 %
SODIUM SERPL-SCNC: 141 MMOL/L
WBC # FLD AUTO: 12.57 K/UL

## 2022-08-22 PROBLEM — Z01.818 PREOPERATIVE CLEARANCE: Status: ACTIVE | Noted: 2022-08-22

## 2022-08-23 ENCOUNTER — APPOINTMENT (OUTPATIENT)
Dept: INTERNAL MEDICINE | Facility: CLINIC | Age: 72
End: 2022-08-23

## 2022-08-23 ENCOUNTER — NON-APPOINTMENT (OUTPATIENT)
Age: 72
End: 2022-08-23

## 2022-08-23 VITALS
HEIGHT: 63 IN | HEART RATE: 64 BPM | TEMPERATURE: 98.1 F | OXYGEN SATURATION: 95 % | DIASTOLIC BLOOD PRESSURE: 69 MMHG | BODY MASS INDEX: 34.91 KG/M2 | SYSTOLIC BLOOD PRESSURE: 158 MMHG | WEIGHT: 197 LBS

## 2022-08-23 DIAGNOSIS — H26.9 UNSPECIFIED CATARACT: ICD-10-CM

## 2022-08-23 DIAGNOSIS — Z01.818 ENCOUNTER FOR OTHER PREPROCEDURAL EXAMINATION: ICD-10-CM

## 2022-08-23 PROCEDURE — 36415 COLL VENOUS BLD VENIPUNCTURE: CPT

## 2022-08-23 PROCEDURE — 99214 OFFICE O/P EST MOD 30 MIN: CPT | Mod: 25

## 2022-08-23 PROCEDURE — 93000 ELECTROCARDIOGRAM COMPLETE: CPT

## 2022-08-24 LAB
ANION GAP SERPL CALC-SCNC: 13 MMOL/L
BASOPHILS # BLD AUTO: 0.07 K/UL
BASOPHILS NFR BLD AUTO: 0.8 %
BUN SERPL-MCNC: 19 MG/DL
CALCIUM SERPL-MCNC: 11 MG/DL
CHLORIDE SERPL-SCNC: 106 MMOL/L
CO2 SERPL-SCNC: 20 MMOL/L
CREAT SERPL-MCNC: 0.75 MG/DL
EGFR: 83 ML/MIN/1.73M2
EOSINOPHIL # BLD AUTO: 0.33 K/UL
EOSINOPHIL NFR BLD AUTO: 3.6 %
GLUCOSE SERPL-MCNC: 174 MG/DL
HCT VFR BLD CALC: 37.6 %
HGB BLD-MCNC: 11.6 G/DL
IMM GRANULOCYTES NFR BLD AUTO: 0.3 %
LYMPHOCYTES # BLD AUTO: 1.76 K/UL
LYMPHOCYTES NFR BLD AUTO: 19 %
MAN DIFF?: NORMAL
MCHC RBC-ENTMCNC: 29.1 PG
MCHC RBC-ENTMCNC: 30.9 GM/DL
MCV RBC AUTO: 94.2 FL
MONOCYTES # BLD AUTO: 0.53 K/UL
MONOCYTES NFR BLD AUTO: 5.7 %
NEUTROPHILS # BLD AUTO: 6.56 K/UL
NEUTROPHILS NFR BLD AUTO: 70.6 %
PLATELET # BLD AUTO: 192 K/UL
POTASSIUM SERPL-SCNC: 4.7 MMOL/L
RBC # BLD: 3.99 M/UL
RBC # FLD: 14.7 %
SODIUM SERPL-SCNC: 139 MMOL/L
WBC # FLD AUTO: 9.28 K/UL

## 2022-09-06 ENCOUNTER — RX RENEWAL (OUTPATIENT)
Age: 72
End: 2022-09-06

## 2022-09-27 ENCOUNTER — APPOINTMENT (OUTPATIENT)
Dept: INTERNAL MEDICINE | Facility: CLINIC | Age: 72
End: 2022-09-27

## 2022-09-27 VITALS
OXYGEN SATURATION: 96 % | TEMPERATURE: 98.1 F | SYSTOLIC BLOOD PRESSURE: 186 MMHG | HEART RATE: 61 BPM | DIASTOLIC BLOOD PRESSURE: 65 MMHG

## 2022-09-27 PROCEDURE — G0008: CPT

## 2022-09-27 PROCEDURE — 90662 IIV NO PRSV INCREASED AG IM: CPT

## 2022-09-27 PROCEDURE — 99214 OFFICE O/P EST MOD 30 MIN: CPT | Mod: 25

## 2022-09-27 RX ORDER — FAMOTIDINE 20 MG/1
20 TABLET, FILM COATED ORAL
Qty: 30 | Refills: 0 | Status: DISCONTINUED | COMMUNITY
Start: 2021-04-01 | End: 2022-09-27

## 2022-10-06 ENCOUNTER — LABORATORY RESULT (OUTPATIENT)
Age: 72
End: 2022-10-06

## 2022-10-06 ENCOUNTER — APPOINTMENT (OUTPATIENT)
Dept: INTERNAL MEDICINE | Facility: CLINIC | Age: 72
End: 2022-10-06

## 2022-10-06 VITALS
TEMPERATURE: 98.2 F | WEIGHT: 195 LBS | HEIGHT: 63 IN | BODY MASS INDEX: 34.55 KG/M2 | DIASTOLIC BLOOD PRESSURE: 60 MMHG | HEART RATE: 62 BPM | SYSTOLIC BLOOD PRESSURE: 153 MMHG | OXYGEN SATURATION: 95 %

## 2022-10-06 DIAGNOSIS — R50.9 FEVER, UNSPECIFIED: ICD-10-CM

## 2022-10-06 PROCEDURE — 36415 COLL VENOUS BLD VENIPUNCTURE: CPT

## 2022-10-06 PROCEDURE — 87811 SARS-COV-2 COVID19 W/OPTIC: CPT

## 2022-10-06 PROCEDURE — 99213 OFFICE O/P EST LOW 20 MIN: CPT | Mod: 25

## 2022-10-07 LAB
ALBUMIN SERPL ELPH-MCNC: 4.4 G/DL
ALP BLD-CCNC: 72 U/L
ALT SERPL-CCNC: 16 U/L
ANION GAP SERPL CALC-SCNC: 15 MMOL/L
APPEARANCE: CLEAR
AST SERPL-CCNC: 20 U/L
BASOPHILS # BLD AUTO: 0.05 K/UL
BASOPHILS NFR BLD AUTO: 0.6 %
BILIRUB SERPL-MCNC: 0.3 MG/DL
BILIRUBIN URINE: NEGATIVE
BLOOD URINE: NEGATIVE
BUN SERPL-MCNC: 16 MG/DL
CALCIUM SERPL-MCNC: 9.9 MG/DL
CHLORIDE SERPL-SCNC: 105 MMOL/L
CO2 SERPL-SCNC: 19 MMOL/L
COLOR: YELLOW
CREAT SERPL-MCNC: 0.79 MG/DL
EGFR: 78 ML/MIN/1.73M2
EOSINOPHIL # BLD AUTO: 0.37 K/UL
EOSINOPHIL NFR BLD AUTO: 4.1 %
GLUCOSE QUALITATIVE U: NEGATIVE
GLUCOSE SERPL-MCNC: 143 MG/DL
HCT VFR BLD CALC: 36.7 %
HGB BLD-MCNC: 11.1 G/DL
IMM GRANULOCYTES NFR BLD AUTO: 0.3 %
KETONES URINE: NEGATIVE
LEUKOCYTE ESTERASE URINE: NEGATIVE
LYMPHOCYTES # BLD AUTO: 1.86 K/UL
LYMPHOCYTES NFR BLD AUTO: 20.5 %
MAN DIFF?: NORMAL
MCHC RBC-ENTMCNC: 28.5 PG
MCHC RBC-ENTMCNC: 30.2 GM/DL
MCV RBC AUTO: 94.1 FL
MONOCYTES # BLD AUTO: 0.64 K/UL
MONOCYTES NFR BLD AUTO: 7 %
NEUTROPHILS # BLD AUTO: 6.13 K/UL
NEUTROPHILS NFR BLD AUTO: 67.5 %
NITRITE URINE: NEGATIVE
PH URINE: 6
PLATELET # BLD AUTO: 181 K/UL
POTASSIUM SERPL-SCNC: 4.3 MMOL/L
PROT SERPL-MCNC: 6.4 G/DL
PROTEIN URINE: ABNORMAL
RBC # BLD: 3.9 M/UL
RBC # FLD: 14.5 %
SODIUM SERPL-SCNC: 139 MMOL/L
SPECIFIC GRAVITY URINE: 1.02
UROBILINOGEN URINE: NORMAL
WBC # FLD AUTO: 9.08 K/UL

## 2022-10-10 LAB — BACTERIA UR CULT: NORMAL

## 2022-11-28 ENCOUNTER — APPOINTMENT (OUTPATIENT)
Dept: INTERNAL MEDICINE | Facility: CLINIC | Age: 72
End: 2022-11-28

## 2022-12-21 NOTE — ED PROVIDER NOTE - DR. NAME
Evaluation for Aortic stenosis    PAST MEDICAL & SURGICAL HISTORY:  Diabetes mellitus type II, controlled  Atrial fibrillation  Congestive heart failure  Dyspnea  Morbid obesity with BMI of 40.0-44.9, adult  Neuropathy  Peripheral venous insufficiency  Thyroid nodule  HTN (hypertension)  Cellulitis  History of esophagogastroduodenoscopy (EGD)  H/O colonoscopy  Other complications of gastric band procedure  S/P left knee arthroscopy  Status post medial meniscus repair    FAMILY HISTORY:  Family history of breast cancer in mother  Family history of diabetes mellitus in mother    Brief Hospital Course: 71 year old female former smoker with PMH of morbid obesity, Aortic Stenosis, HTN, HLD, type 2 DM (HA1c 6.3 on Metformin and Farxiga), LE cellulitis, bacteremia, recently admitted to [facility] 11/30-12/8 of ESBL E.Coli and Proteus UTI, lap band (since removed), chronic LE venous stasis with vascular ulcers (followed by Dr. Yoo), presented from home 12/14/22 with complaints of worsening JETT with light exertion and lower extremity edema with associated palpitations. Patient follows with Cardiologist Dr. Mayer and was advised to seek medical attention for recent outpatient TTE demonstrating severe aortic stenosis. Patient admitted to CT Surgery service under Dr. Hopper for TAVR evaluation. Repeat TTE on admission showing Moderate Aortic stenosis with an ADRIAN of 1.1.      Significant recent/past 24 hr events: No overnight events reported.    Subjective: Patient lying in bed in NAD. +Tolerating diet. +Passing BMs. +JETT. No pain elicited at this time. Denies fevers, chills, lightheadedness, dizziness, HA, CP, palpitations, SOB, cough, abdominal pain, N/V, diarrhea, numbness/tingling in extremities, or any other acute complaints. ROS negative x 10 systems except as noted above.    MEDICATIONS  (STANDING):  aMIOdarone    Tablet 400 milliGRAM(s) Oral every 8 hours  aMIOdarone    Tablet 200 milliGRAM(s) Oral daily  atorvastatin 80 milliGRAM(s) Oral at bedtime  ezetimibe 10 milliGRAM(s) Oral daily  furosemide    Tablet 40 milliGRAM(s) Oral daily  gabapentin 800 milliGRAM(s) Oral three times a day  heparin  Infusion.  Unit(s)/Hr (20 mL/Hr) IV Continuous   influenza  Vaccine (HIGH DOSE) 0.7 milliLiter(s) IntraMuscular once  insulin lispro (ADMELOG) corrective regimen sliding scale   SubCutaneous Before meals and at bedtime  insulin lispro Injectable (ADMELOG) 2 Unit(s) SubCutaneous three times a day before meals  meropenem Injectable 1000 milliGRAM(s) IV Push every 8 hours  metoprolol succinate  milliGRAM(s) Oral daily  montelukast 10 milliGRAM(s) Oral at bedtime  saccharomyces boulardii 250 milliGRAM(s) Oral two times a day  sodium chloride 0.9% lock flush 3 milliLiter(s) IV Push every 8 hours  spironolactone 25 milliGRAM(s) Oral daily  verapamil 120 milliGRAM(s) Oral two times a day    MEDICATIONS  (PRN):  acetaminophen     Tablet .. 975 milliGRAM(s) Oral every 8 hours PRN Mild Pain (1 - 3)  zolpidem 5 milliGRAM(s) Oral at bedtime PRN Insomnia  zolpidem 5 milliGRAM(s) Oral at bedtime PRN Insomnia    Allergies:  IV DYE, IODINE CONTRAST (Unknown)  latex (Unknown)  penicillin (Hives; Urticaria)  pregabalin (Unknown)    Vitals   T(C): 36.7 (21 Dec 2022 01:30), Max: 37 (20 Dec 2022 12:30)  T(F): 98 (21 Dec 2022 01:30), Max: 98.6 (20 Dec 2022 12:30)  HR: 59 (21 Dec 2022 01:30) (54 - 83)  BP: 112/68 (21 Dec 2022 01:30) (98/52 - 144/92)  RR: 18 (21 Dec 2022 01:30) (10 - 21)  SpO2: 97% (21 Dec 2022 01:30) (92% - 98%)  O2 Parameters below as of 21 Dec 2022 01:30  Patient On (Oxygen Delivery Method): nasal cannula  O2 Flow (L/min): 2    I&O's Detail    19 Dec 2022 07:01  -  20 Dec 2022 07:00  --------------------------------------------------------  IN:    Heparin Infusion: 380 mL    IV PiggyBack: 50 mL    IV PiggyBack: 20 mL    Oral Fluid: 150 mL  Total IN: 600 mL    OUT:    Voided (mL): 400 mL  Total OUT: 400 mL    Total NET: 200 mL      20 Dec 2022 07:01  -  21 Dec 2022 04:44  --------------------------------------------------------  IN:    Heparin Infusion: 300 mL    Oral Fluid: 470 mL  Total IN: 770 mL    OUT:    Voided (mL): 260 mL  Total OUT: 260 mL    Total NET: 510 mL    Physical Exam  Neuro: A+O x 3, non-focal, speech clear and intact  HEENT:  NCAT, No conjuctival edema or icterus, no thrush.    Neck:  Supple, trachea midline  Pulm: CTA bilaterally, no accessory muscle use noted  CV: regular rate, irregularly irregular rhythm, +S1S2, +murmur  Abd: soft, NT, ND, + BS  Ext: CAPPS x 4, trace LE edema b/l with chronic LE skin changes/ hyperpigmentation from chronic venous insufficiency, no cyanosis, overall distal motor/neuro/circ intact  right radial cath site C/D/I, no hematoma noted  Skin: warm, dry, perfused    LABS                        10.8   7.08  )-----------( 334      ( 20 Dec 2022 04:40 )             34.7     12-20    137  |  103  |  14.9  ----------------------------<  111<H>  4.3   |  27.0  |  0.58    Ca    8.6      20 Dec 2022 04:40  Mg     1.8     12-20    PT/INR - ( 20 Dec 2022 04:40 )   PT: 16.6 sec;   INR: 1.43 ratio      PTT - ( 20 Dec 2022 04:40 )  PTT:99.0 sec    POCT Blood Glucose.: 200 mg/dL (12-20-22 @ 21:20)  POCT Blood Glucose.: 244 mg/dL (12-20-22 @ 17:09)  POCT Blood Glucose.: 142 mg/dL (12-20-22 @ 12:31)  POCT Blood Glucose.: 108 mg/dL (12-20-22 @ 08:18)      Last CXR:  < from: Xray Chest 1 View- PORTABLE-Routine (Xray Chest 1 View- PORTABLE-Routine in AM.) (12.18.22 @ 07:05) >  Frontal expiratory view of the chest shows the heart to be similar in size. The lungs show mild pulmonary congestion and there is no evidence of pneumothorax nor pleural effusion.  Chest one view 12/17/2022 4:48 AM  Compared to the prior study, mild pulmonary congestion is similar.  Chest one view 7/18/2022 4:55 AM  Compared to the prior study, mild pulmonary congestion is similar.  IMPRESSION:  Mild congestive changes.  < end of copied text >   Augustina

## 2023-03-06 RX ORDER — ALBUTEROL SULFATE 90 UG/1
108 (90 BASE) INHALANT RESPIRATORY (INHALATION)
Qty: 1 | Refills: 3 | Status: ACTIVE | COMMUNITY
Start: 2021-04-01 | End: 1900-01-01

## 2023-03-21 ENCOUNTER — RX RENEWAL (OUTPATIENT)
Age: 73
End: 2023-03-21

## 2023-03-22 ENCOUNTER — RX RENEWAL (OUTPATIENT)
Age: 73
End: 2023-03-22

## 2023-03-29 ENCOUNTER — LABORATORY RESULT (OUTPATIENT)
Age: 73
End: 2023-03-29

## 2023-03-29 ENCOUNTER — APPOINTMENT (OUTPATIENT)
Dept: INTERNAL MEDICINE | Facility: CLINIC | Age: 73
End: 2023-03-29
Payer: MEDICARE

## 2023-03-29 VITALS
BODY MASS INDEX: 35.79 KG/M2 | TEMPERATURE: 98.3 F | OXYGEN SATURATION: 95 % | WEIGHT: 202 LBS | HEART RATE: 66 BPM | HEIGHT: 63 IN | DIASTOLIC BLOOD PRESSURE: 78 MMHG | SYSTOLIC BLOOD PRESSURE: 161 MMHG

## 2023-03-29 DIAGNOSIS — F31.9 BIPOLAR DISORDER, UNSPECIFIED: ICD-10-CM

## 2023-03-29 DIAGNOSIS — Z00.00 ENCOUNTER FOR GENERAL ADULT MEDICAL EXAMINATION W/OUT ABNORMAL FINDINGS: ICD-10-CM

## 2023-03-29 DIAGNOSIS — E66.9 OBESITY, UNSPECIFIED: ICD-10-CM

## 2023-03-29 DIAGNOSIS — E55.9 VITAMIN D DEFICIENCY, UNSPECIFIED: ICD-10-CM

## 2023-03-29 DIAGNOSIS — Z13.820 ENCOUNTER FOR SCREENING FOR OSTEOPOROSIS: ICD-10-CM

## 2023-03-29 DIAGNOSIS — D64.9 ANEMIA, UNSPECIFIED: ICD-10-CM

## 2023-03-29 DIAGNOSIS — Z12.39 ENCOUNTER FOR OTHER SCREENING FOR MALIGNANT NEOPLASM OF BREAST: ICD-10-CM

## 2023-03-29 DIAGNOSIS — R25.1 TREMOR, UNSPECIFIED: ICD-10-CM

## 2023-03-29 PROCEDURE — 99213 OFFICE O/P EST LOW 20 MIN: CPT | Mod: 25

## 2023-03-29 PROCEDURE — G0439: CPT

## 2023-03-29 PROCEDURE — G0447 BEHAVIOR COUNSEL OBESITY 15M: CPT | Mod: 59

## 2023-03-29 PROCEDURE — 36415 COLL VENOUS BLD VENIPUNCTURE: CPT

## 2023-03-30 LAB
ALBUMIN SERPL ELPH-MCNC: 4.4 G/DL
ALP BLD-CCNC: 78 U/L
ALT SERPL-CCNC: 13 U/L
ANION GAP SERPL CALC-SCNC: 15 MMOL/L
APPEARANCE: CLEAR
AST SERPL-CCNC: 16 U/L
BASOPHILS # BLD AUTO: 0.07 K/UL
BASOPHILS NFR BLD AUTO: 0.6 %
BILIRUB SERPL-MCNC: 0.4 MG/DL
BILIRUBIN URINE: NEGATIVE
BLOOD URINE: NEGATIVE
BUN SERPL-MCNC: 17 MG/DL
CALCIUM SERPL-MCNC: 11 MG/DL
CHLORIDE SERPL-SCNC: 104 MMOL/L
CO2 SERPL-SCNC: 19 MMOL/L
COLOR: NORMAL
CREAT SERPL-MCNC: 0.79 MG/DL
EGFR: 78 ML/MIN/1.73M2
EOSINOPHIL # BLD AUTO: 0.33 K/UL
EOSINOPHIL NFR BLD AUTO: 3 %
ESTIMATED AVERAGE GLUCOSE: 174 MG/DL
FERRITIN SERPL-MCNC: 25 NG/ML
GLUCOSE QUALITATIVE U: NEGATIVE
GLUCOSE SERPL-MCNC: 118 MG/DL
HBA1C MFR BLD HPLC: 7.7 %
HCT VFR BLD CALC: 37.3 %
HGB BLD-MCNC: 12.2 G/DL
IMM GRANULOCYTES NFR BLD AUTO: 0.4 %
IRON SATN MFR SERPL: 16 %
IRON SERPL-MCNC: 60 UG/DL
KETONES URINE: NEGATIVE
LEUKOCYTE ESTERASE URINE: NEGATIVE
LYMPHOCYTES # BLD AUTO: 2.39 K/UL
LYMPHOCYTES NFR BLD AUTO: 21.5 %
MAN DIFF?: NORMAL
MCHC RBC-ENTMCNC: 29 PG
MCHC RBC-ENTMCNC: 32.7 GM/DL
MCV RBC AUTO: 88.8 FL
MONOCYTES # BLD AUTO: 0.79 K/UL
MONOCYTES NFR BLD AUTO: 7.1 %
NEUTROPHILS # BLD AUTO: 7.49 K/UL
NEUTROPHILS NFR BLD AUTO: 67.4 %
NITRITE URINE: NEGATIVE
PH URINE: 6
PLATELET # BLD AUTO: 174 K/UL
POTASSIUM SERPL-SCNC: 4.1 MMOL/L
PROT SERPL-MCNC: 6.9 G/DL
PROTEIN URINE: ABNORMAL
RBC # BLD: 4.2 M/UL
RBC # FLD: 14.2 %
SODIUM SERPL-SCNC: 138 MMOL/L
SPECIFIC GRAVITY URINE: 1.01
TIBC SERPL-MCNC: 383 UG/DL
UIBC SERPL-MCNC: 324 UG/DL
UROBILINOGEN URINE: NORMAL
WBC # FLD AUTO: 11.11 K/UL

## 2023-04-21 ENCOUNTER — RX RENEWAL (OUTPATIENT)
Age: 73
End: 2023-04-21

## 2023-05-17 ENCOUNTER — RESULT REVIEW (OUTPATIENT)
Age: 73
End: 2023-05-17

## 2023-05-17 ENCOUNTER — APPOINTMENT (OUTPATIENT)
Dept: RADIOLOGY | Facility: CLINIC | Age: 73
End: 2023-05-17
Payer: MEDICARE

## 2023-05-17 ENCOUNTER — OUTPATIENT (OUTPATIENT)
Dept: OUTPATIENT SERVICES | Facility: HOSPITAL | Age: 73
LOS: 1 days | End: 2023-05-17

## 2023-05-17 ENCOUNTER — APPOINTMENT (OUTPATIENT)
Dept: MAMMOGRAPHY | Facility: CLINIC | Age: 73
End: 2023-05-17
Payer: MEDICARE

## 2023-05-17 PROCEDURE — 77063 BREAST TOMOSYNTHESIS BI: CPT | Mod: 26

## 2023-05-17 PROCEDURE — 77085 DXA BONE DENSITY AXL VRT FX: CPT | Mod: 26

## 2023-05-17 PROCEDURE — 77067 SCR MAMMO BI INCL CAD: CPT | Mod: 26

## 2023-05-19 PROCEDURE — 77085 DXA BONE DENSITY AXL VRT FX: CPT | Mod: 26

## 2023-05-25 RX ORDER — LORAZEPAM 0.5 MG/1
0.5 TABLET ORAL
Qty: 20 | Refills: 0 | Status: ACTIVE | COMMUNITY
Start: 2021-06-15 | End: 1900-01-01

## 2023-06-19 NOTE — PATIENT PROFILE ADULT - DO YOU FEEL LIKE HURTING YOURSELF OR OTHERS?
Pt calling to get a refill of her medication. States that she is almost out.     States that it needs to be a white pill because she is allergic to yellow dye that some pills have.      Routing to provider.      Sara Gomez RN  [facility]    
no

## 2023-06-29 ENCOUNTER — APPOINTMENT (OUTPATIENT)
Dept: INTERNAL MEDICINE | Facility: CLINIC | Age: 73
End: 2023-06-29
Payer: MEDICARE

## 2023-06-29 DIAGNOSIS — F41.0 PANIC DISORDER [EPISODIC PAROXYSMAL ANXIETY]: ICD-10-CM

## 2023-06-29 DIAGNOSIS — J45.909 UNSPECIFIED ASTHMA, UNCOMPLICATED: ICD-10-CM

## 2023-06-29 PROCEDURE — 99443: CPT | Mod: 95

## 2023-06-29 RX ORDER — UMECLIDINIUM 62.5 UG/1
62.5 AEROSOL, POWDER ORAL
Qty: 30 | Refills: 0 | Status: DISCONTINUED | COMMUNITY
Start: 2021-04-01 | End: 2023-06-29

## 2023-06-29 RX ORDER — FLASH GLUCOSE SENSOR
KIT MISCELLANEOUS
Qty: 6 | Refills: 0 | Status: DISCONTINUED | COMMUNITY
Start: 2022-07-15 | End: 2023-06-29

## 2023-06-29 NOTE — ASSESSMENT
[FreeTextEntry1] : 1.)  T2DM\par REVIEWED serial l hemoglobin A1c results, most recently 7.7 on 3/2023 up from 7.2 on 8/2022.\par Reminded patient regarding rationale (to reduce vascular and other complications) as well as the goal (hemoglobin A1c under 7.5, ideally under 7.0) for the management of T2DM.\par Again discussed lifestyle management including structured low calorie diet.  Specific recommendations provided.  Patient states she will probably not be able to follow 100%.\par Consider additional medication, i.e., Jardiance, added to metformin and low-dose glimepiride (2 mg daily).  Note that patient was previously prescribed Trulicity but was unable to tolerate.    Explained additional cardiovascular benefits and she states that she is willing to try this as long as it does not produce nausea and is not an injection.\par Prescription for Jardiance 10 mg sent to pharmacy with instructions for use.  Note that this was not covered 1 year ago but may be covered by her insurance now.\par Patient will return in 2 months for follow-up lipid panel\par \par #2) HTN\par REVIEWED prior recorded SBP which ranged between 150 and 160.\par Patient reminded that goal of treatment is to reduce SBP to under 136 if possible, ideally under 131.\par States that she has been compliant with amlodipine 5 mg which was added 3 months ago and "feels like it is working ".  Minimal ankle edema has not been problematic.\par She will return to office within the next few months for BP check to determine ongoing management\par \par #3) Asthma\par Agree with her decision to remain at home today due to poor air quality.\par Reviewed proper use of albuterol inhaler.\par Will assess pulmonary status when she returns to office in a month or 2 to determine if additional controller medication may be helpful.\par \par #4) Anxiety\par Psychological counseling and support were provided.  Patient denies suicidal ideation.\par Patient reports that her anxiety had been exacerbated over the past several months due to multiple problems involving her mother's health, paying the rent, etc., but has subsequently improved.  States that she has been in communication with her psychotherapist and remains on Abilify 20 mg.\par Discussed short-term use of lorazepam, especially if associated insomnia, but she does not feel this is necessary at this time

## 2023-06-29 NOTE — HISTORY OF PRESENT ILLNESS
[Home] : at home, [unfilled] , at the time of the visit. [Medical Office: ([facility])___] : at the medical office located in  [Verbal consent obtained from patient] : the patient, [unfilled] [FreeTextEntry1] : HTN\par T2DM\par Hypercalcemia [de-identified] : Patient understands that she was supposed to come into office today for blood pressure check but is concerned about poor air quality so has stated home.  In any case, she will come into office within the next month or 2 but she wishes to discuss overall management of HTN, T2DM, asthma, and anxiety symptoms.

## 2023-07-03 NOTE — PATIENT PROFILE ADULT - PATIENT REPRESENTATIVE: ( YOU CAN CHOOSE ANY PERSON THAT CAN ASSIST YOU WITH YOUR HEALTH CARE PREFERENCES, DOES NOT HAVE TO BE A SPOUSE, IMMEDIATE FAMILY OR SIGNIFICANT OTHER/PARTNER)
yes Render Post-Care In The Note: No Method: thermocautery Detail Level: Simple Anesthesia Volume In Cc: 0 Consent: The patient's consent was obtained including but not limited to risks of crusting, scabbing, blistering, scarring, darker or lighter pigmentary change, recurrence, incomplete removal and infection. Post-Care Instructions: I reviewed with the patient in detail post-care instructions. Patient is to wear sunprotection, and avoid picking at any of the treated lesions. Pt may apply Vaseline to crusted or scabbing areas.

## 2023-07-05 ENCOUNTER — RX RENEWAL (OUTPATIENT)
Age: 73
End: 2023-07-05

## 2023-07-13 ENCOUNTER — RX RENEWAL (OUTPATIENT)
Age: 73
End: 2023-07-13

## 2023-07-19 ENCOUNTER — RX RENEWAL (OUTPATIENT)
Age: 73
End: 2023-07-19

## 2023-08-09 NOTE — ED PROVIDER NOTE - OTHER FINDINGS
poor quality ekg-  pt with significant tremor limiting machine Duration Of Freeze Thaw-Cycle (Seconds): 3 Show Applicator Variable?: Yes Detail Level: Simple Consent: The patient's consent was obtained including but not limited to risks of crusting, scabbing, blistering, scarring, darker or lighter pigmentary change, recurrence, incomplete removal and infection. Post-Care Instructions: I reviewed with the patient in detail post-care instructions. Patient is to wear sunprotection, and avoid picking at any of the treated lesions. Pt may apply Vaseline to crusted or scabbing areas. Render Note In Bullet Format When Appropriate: No

## 2023-08-31 ENCOUNTER — APPOINTMENT (OUTPATIENT)
Dept: INTERNAL MEDICINE | Facility: CLINIC | Age: 73
End: 2023-08-31

## 2023-09-21 ENCOUNTER — RX RENEWAL (OUTPATIENT)
Age: 73
End: 2023-09-21

## 2023-09-28 ENCOUNTER — APPOINTMENT (OUTPATIENT)
Dept: INTERNAL MEDICINE | Facility: CLINIC | Age: 73
End: 2023-09-28
Payer: MEDICARE

## 2023-09-28 DIAGNOSIS — Z71.85 ENCOUNTER FOR IMMUNIZATION SAFETY COUNSELING: ICD-10-CM

## 2023-09-28 DIAGNOSIS — M25.562 PAIN IN LEFT KNEE: ICD-10-CM

## 2023-09-28 PROCEDURE — 99442: CPT | Mod: 95

## 2023-10-16 ENCOUNTER — LABORATORY RESULT (OUTPATIENT)
Age: 73
End: 2023-10-16

## 2023-10-17 ENCOUNTER — APPOINTMENT (OUTPATIENT)
Dept: INTERNAL MEDICINE | Facility: CLINIC | Age: 73
End: 2023-10-17
Payer: MEDICARE

## 2023-10-17 VITALS
HEART RATE: 89 BPM | DIASTOLIC BLOOD PRESSURE: 61 MMHG | OXYGEN SATURATION: 92 % | BODY MASS INDEX: 35.61 KG/M2 | WEIGHT: 201 LBS | TEMPERATURE: 97.3 F | HEIGHT: 63 IN | SYSTOLIC BLOOD PRESSURE: 138 MMHG

## 2023-10-17 DIAGNOSIS — Z23 ENCOUNTER FOR IMMUNIZATION: ICD-10-CM

## 2023-10-17 DIAGNOSIS — I10 ESSENTIAL (PRIMARY) HYPERTENSION: ICD-10-CM

## 2023-10-17 DIAGNOSIS — E83.52 HYPERCALCEMIA: ICD-10-CM

## 2023-10-17 DIAGNOSIS — E11.9 TYPE 2 DIABETES MELLITUS W/OUT COMPLICATIONS: ICD-10-CM

## 2023-10-17 PROCEDURE — 36415 COLL VENOUS BLD VENIPUNCTURE: CPT

## 2023-10-17 PROCEDURE — G0008: CPT

## 2023-10-17 PROCEDURE — 90662 IIV NO PRSV INCREASED AG IM: CPT

## 2023-10-17 PROCEDURE — 99214 OFFICE O/P EST MOD 30 MIN: CPT

## 2023-10-17 RX ORDER — DAPAGLIFLOZIN 5 MG/1
5 TABLET, FILM COATED ORAL DAILY
Qty: 90 | Refills: 3 | Status: ACTIVE | COMMUNITY
Start: 2023-10-17 | End: 1900-01-01

## 2023-10-17 RX ORDER — EMPAGLIFLOZIN 10 MG/1
10 TABLET, FILM COATED ORAL DAILY
Qty: 90 | Refills: 3 | Status: DISCONTINUED | COMMUNITY
Start: 2023-06-29 | End: 2023-10-17

## 2023-10-18 LAB
25(OH)D3 SERPL-MCNC: 17.2 NG/ML
ALBUMIN SERPL ELPH-MCNC: 4.5 G/DL
ALP BLD-CCNC: 88 U/L
ALT SERPL-CCNC: 17 U/L
ANION GAP SERPL CALC-SCNC: 15 MMOL/L
APPEARANCE: CLEAR
AST SERPL-CCNC: 22 U/L
BILIRUB SERPL-MCNC: 0.3 MG/DL
BILIRUBIN URINE: NEGATIVE
BLOOD URINE: NEGATIVE
BUN SERPL-MCNC: 18 MG/DL
CALCIUM SERPL-MCNC: 10.6 MG/DL
CHLORIDE SERPL-SCNC: 104 MMOL/L
CO2 SERPL-SCNC: 22 MMOL/L
COLOR: YELLOW
CREAT SERPL-MCNC: 0.89 MG/DL
DEPRECATED KAPPA LC FREE/LAMBDA SER: 1.85 RATIO
EGFR: 68 ML/MIN/1.73M2
ESTIMATED AVERAGE GLUCOSE: 174 MG/DL
GLUCOSE QUALITATIVE U: NEGATIVE MG/DL
GLUCOSE SERPL-MCNC: 186 MG/DL
HBA1C MFR BLD HPLC: 7.7 %
IGA SER QL IEP: 193 MG/DL
IGG SER QL IEP: 752 MG/DL
IGM SER QL IEP: 99 MG/DL
KAPPA LC CSF-MCNC: 1.96 MG/DL
KAPPA LC SERPL-MCNC: 3.62 MG/DL
KETONES URINE: NEGATIVE MG/DL
LEUKOCYTE ESTERASE URINE: NEGATIVE
NITRITE URINE: NEGATIVE
PH URINE: 5.5
POTASSIUM SERPL-SCNC: 4.1 MMOL/L
PROT SERPL-MCNC: 7.1 G/DL
PROTEIN URINE: 300 MG/DL
SODIUM SERPL-SCNC: 141 MMOL/L
SPECIFIC GRAVITY URINE: 1.02
UROBILINOGEN URINE: 0.2 MG/DL

## 2023-10-18 RX ORDER — ERGOCALCIFEROL 1.25 MG/1
1.25 MG CAPSULE, LIQUID FILLED ORAL
Qty: 12 | Refills: 1 | Status: COMPLETED | COMMUNITY
Start: 2023-10-18 | End: 2024-04-03

## 2023-11-13 ENCOUNTER — NON-APPOINTMENT (OUTPATIENT)
Age: 73
End: 2023-11-13

## 2023-12-11 ENCOUNTER — RX RENEWAL (OUTPATIENT)
Age: 73
End: 2023-12-11

## 2023-12-11 RX ORDER — FUROSEMIDE 20 MG/1
20 TABLET ORAL
Qty: 90 | Refills: 3 | Status: ACTIVE | COMMUNITY
Start: 2018-06-27 | End: 1900-01-01

## 2023-12-18 RX ORDER — NITROFURANTOIN MACROCRYSTALS 100 MG/1
100 CAPSULE ORAL
Qty: 10 | Refills: 1 | Status: ACTIVE | COMMUNITY
Start: 2021-04-15 | End: 1900-01-01

## 2023-12-19 ENCOUNTER — RX RENEWAL (OUTPATIENT)
Age: 73
End: 2023-12-19

## 2023-12-19 RX ORDER — AMLODIPINE BESYLATE 5 MG/1
5 TABLET ORAL DAILY
Qty: 90 | Refills: 2 | Status: ACTIVE | COMMUNITY
Start: 2023-03-30 | End: 1900-01-01

## 2023-12-27 ENCOUNTER — RX RENEWAL (OUTPATIENT)
Age: 73
End: 2023-12-27

## 2024-02-29 ENCOUNTER — APPOINTMENT (OUTPATIENT)
Dept: INTERNAL MEDICINE | Facility: CLINIC | Age: 74
End: 2024-02-29

## 2024-03-05 ENCOUNTER — APPOINTMENT (OUTPATIENT)
Dept: INTERNAL MEDICINE | Facility: CLINIC | Age: 74
End: 2024-03-05

## 2024-03-21 ENCOUNTER — RX RENEWAL (OUTPATIENT)
Age: 74
End: 2024-03-21

## 2024-05-13 ENCOUNTER — RX RENEWAL (OUTPATIENT)
Age: 74
End: 2024-05-13

## 2024-05-13 RX ORDER — GLIMEPIRIDE 2 MG/1
2 TABLET ORAL DAILY
Qty: 90 | Refills: 2 | Status: ACTIVE | COMMUNITY
Start: 2022-05-26 | End: 1900-01-01

## 2024-08-13 ENCOUNTER — APPOINTMENT (OUTPATIENT)
Dept: INTERNAL MEDICINE | Facility: CLINIC | Age: 74
End: 2024-08-13

## 2024-08-13 ENCOUNTER — LABORATORY RESULT (OUTPATIENT)
Age: 74
End: 2024-08-13

## 2024-08-13 VITALS
WEIGHT: 186 LBS | HEIGHT: 63 IN | DIASTOLIC BLOOD PRESSURE: 62 MMHG | TEMPERATURE: 98.3 F | SYSTOLIC BLOOD PRESSURE: 147 MMHG | OXYGEN SATURATION: 91 % | BODY MASS INDEX: 32.96 KG/M2 | HEART RATE: 95 BPM

## 2024-08-13 DIAGNOSIS — Z71.85 ENCOUNTER FOR IMMUNIZATION SAFETY COUNSELING: ICD-10-CM

## 2024-08-13 DIAGNOSIS — A41.9 SEPSIS, UNSPECIFIED ORGANISM: ICD-10-CM

## 2024-08-13 DIAGNOSIS — E11.42 TYPE 2 DIABETES MELLITUS WITH DIABETIC POLYNEUROPATHY: ICD-10-CM

## 2024-08-13 DIAGNOSIS — G62.9 POLYNEUROPATHY, UNSPECIFIED: ICD-10-CM

## 2024-08-13 DIAGNOSIS — R25.1 TREMOR, UNSPECIFIED: ICD-10-CM

## 2024-08-13 DIAGNOSIS — F31.9 BIPOLAR DISORDER, UNSPECIFIED: ICD-10-CM

## 2024-08-13 DIAGNOSIS — I10 ESSENTIAL (PRIMARY) HYPERTENSION: ICD-10-CM

## 2024-08-13 DIAGNOSIS — E11.9 TYPE 2 DIABETES MELLITUS W/OUT COMPLICATIONS: ICD-10-CM

## 2024-08-13 DIAGNOSIS — Z79.899 OTHER LONG TERM (CURRENT) DRUG THERAPY: ICD-10-CM

## 2024-08-13 DIAGNOSIS — E66.9 OBESITY, UNSPECIFIED: ICD-10-CM

## 2024-08-13 DIAGNOSIS — E55.9 VITAMIN D DEFICIENCY, UNSPECIFIED: ICD-10-CM

## 2024-08-13 DIAGNOSIS — N39.0 SEPSIS, UNSPECIFIED ORGANISM: ICD-10-CM

## 2024-08-13 DIAGNOSIS — Z00.00 ENCOUNTER FOR GENERAL ADULT MEDICAL EXAMINATION W/OUT ABNORMAL FINDINGS: ICD-10-CM

## 2024-08-13 DIAGNOSIS — E78.5 HYPERLIPIDEMIA, UNSPECIFIED: ICD-10-CM

## 2024-08-13 DIAGNOSIS — J45.909 UNSPECIFIED ASTHMA, UNCOMPLICATED: ICD-10-CM

## 2024-08-13 PROCEDURE — 90677 PCV20 VACCINE IM: CPT

## 2024-08-13 PROCEDURE — 36415 COLL VENOUS BLD VENIPUNCTURE: CPT

## 2024-08-13 PROCEDURE — G0009: CPT

## 2024-08-13 PROCEDURE — G0447 BEHAVIOR COUNSEL OBESITY 15M: CPT

## 2024-08-13 PROCEDURE — G0439: CPT

## 2024-08-13 NOTE — PHYSICAL EXAM
[No Acute Distress] : no acute distress [Well Nourished] : well nourished [Well Developed] : well developed [Well-Appearing] : well-appearing [Normal Sclera/Conjunctiva] : normal sclera/conjunctiva [PERRL] : pupils equal round and reactive to light [EOMI] : extraocular movements intact [Normal Outer Ear/Nose] : the outer ears and nose were normal in appearance [Normal Oropharynx] : the oropharynx was normal [No JVD] : no jugular venous distention [No Lymphadenopathy] : no lymphadenopathy [Supple] : supple [Thyroid Normal, No Nodules] : the thyroid was normal and there were no nodules present [No Respiratory Distress] : no respiratory distress  [No Accessory Muscle Use] : no accessory muscle use [Clear to Auscultation] : lungs were clear to auscultation bilaterally [Normal Rate] : normal rate  [Regular Rhythm] : with a regular rhythm [Normal S1, S2] : normal S1 and S2 [No Murmur] : no murmur heard [No Carotid Bruits] : no carotid bruits [No Edema] : there was no peripheral edema [Soft] : abdomen soft [Non Tender] : non-tender [Non-distended] : non-distended [No Masses] : no abdominal mass palpated [No HSM] : no HSM [Normal Bowel Sounds] : normal bowel sounds [Normal Posterior Cervical Nodes] : no posterior cervical lymphadenopathy [Normal Anterior Cervical Nodes] : no anterior cervical lymphadenopathy [No CVA Tenderness] : no CVA  tenderness [No Spinal Tenderness] : no spinal tenderness [No Joint Swelling] : no joint swelling [Grossly Normal Strength/Tone] : grossly normal strength/tone [No Rash] : no rash [Coordination Grossly Intact] : coordination grossly intact [No Focal Deficits] : no focal deficits [Normal Gait] : normal gait [Deep Tendon Reflexes (DTR)] : deep tendon reflexes were 2+ and symmetric [Normal] : normal gait, coordination grossly intact, no focal deficits and deep tendon reflexes were 2+ and symmetric [Normal Affect] : the affect was normal [Normal Insight/Judgement] : insight and judgment were intact [de-identified] : Moderately obese [de-identified] : Slightly hyper affect but fully appropriate

## 2024-08-13 NOTE — HISTORY OF PRESENT ILLNESS
[Family Member] : family member [FreeTextEntry1] : 74-year-old female with T2 DM, hypertension, hyperlipidemia, low back pain/sacroiliitis, and chronic bipolar illness returns for yearly CPE. She denies hospitalization or surgery (other than cataract removal) in the past year.  She also denies any new medical diagnosis.  [de-identified] : She wishes to discuss ongoing management of diabetes and hypertension. States she has been compliant with medications but has not been able to adopt lifestyle modifications as previously recommended. Continues to have right arm tremor, possibly worsening  Patient denies acute recurrent anxiety attacks but states that, in general, she had a "bad "winter which she had to spend at home, taking care of her mother.  She feels mildly depressed but within the range of her usual mood fluctuation and she also notes improvement in her mood within the past several months.  She has been getting out of her house in order to attend services and other functions at her [employer]. Her mother is currently in hospice at home with home care attendant several times per week which she says has taken "a great load off of her back ".

## 2024-08-13 NOTE — REVIEW OF SYSTEMS
[Back Pain] : back pain [Negative] : Heme/Lymph [Unsteady Walking] : ataxia [Suicidal] : not suicidal [Anxiety] : no anxiety [Depression] : no depression

## 2024-08-13 NOTE — HEALTH RISK ASSESSMENT
[Good] : ~his/her~  mood as  good [No] : In the past 12 months have you used drugs other than those required for medical reasons? No [No falls in past year] : Patient reported no falls in the past year [0] : 2) Feeling down, depressed, or hopeless: Not at all (0) [PHQ-2 Negative - No further assessment needed] : PHQ-2 Negative - No further assessment needed [Patient reported mammogram was normal] : Patient reported mammogram was normal [Patient declined PAP Smear] : Patient declined PAP Smear [Patient declined colonoscopy] : Patient declined colonoscopy [HIV test declined] : HIV test declined [Hepatitis C test declined] : Hepatitis C test declined [With Family] : lives with family [Retired] : retired [Single] : single [Fully functional (bathing, dressing, toileting, transferring, walking, feeding)] : Fully functional (bathing, dressing, toileting, transferring, walking, feeding) [Fully functional (using the telephone, shopping, preparing meals, housekeeping, doing laundry, using] : Fully functional and needs no help or supervision to perform IADLs (using the telephone, shopping, preparing meals, housekeeping, doing laundry, using transportation, managing medications and managing finances) [Reports normal functional visual acuity (ie: able to read med bottle)] : Reports normal functional visual acuity [Seat Belt] :  uses seat belt [Sunscreen] : uses sunscreen [Former] : Former [15-19] : 15-19 [> 15 Years] : > 15 Years [NO] : No [Audit-CScore] : 0 [LWR2Yxcps] : 0 [Change in mental status noted] : No change in mental status noted [Sexually Active] : not sexually active [Reports changes in hearing] : Reports no changes in hearing [Reports changes in vision] : Reports no changes in vision [Reports changes in dental health] : Reports no changes in dental health [TB Exposure] : is not being exposed to tuberculosis [MammogramDate] : 7/2018 [PapSmearDate] : 2013 [ColonoscopyDate] : 2008 [de-identified] : mother [AdvancecareDate] : 08/2024

## 2024-08-13 NOTE — COUNSELING
[Potential consequences of obesity discussed] : Potential consequences of obesity discussed [Benefits of weight loss discussed] : Benefits of weight loss discussed [Structured Weight Management Program suggested:] : Structured weight management program suggested [Encouraged to maintain food diary] : Encouraged to maintain food diary [Encouraged to increase physical activity] : Encouraged to increase physical activity [Encouraged to use exercise tracking device] : Encouraged to use exercise tracking device [Target Wt Loss Goal ___] : Weight Loss Goals: Target weight loss goal [unfilled] lbs [Weigh Self Weekly] : weigh self weekly [Decrease Portions] : decrease portions [Keep Food Diary] : keep food diary [Needs reinforcement, provided] : Patient needs reinforcement on understanding of disease, goals and obesity follow-up plan; reinforcement was provided [Good understanding] : Patient has a good understanding of lifestyle changes and steps needed to achieve self management goal [FreeTextEntry4] : 15

## 2024-08-13 NOTE — ASSESSMENT
[FreeTextEntry1] : Health Maintenance. Daily aerobic exercises strongly recommended. No STD risk or substance abuse per patient report. Occasional gender specific self-examination is suggested. Patient declines follow-up GYN visits at this time but is willing to have mammography (last done 2018) Mammography and bone density ordered.  Patient states will schedule within the next few weeks. Mild depression, stable on current regimen.  Denies suicidality or intention for self-harm.  Will reevaluate in a few months to confirm stability. Competent with ADLs. FOBT done 6/2022, was negative.  Follow-up testing not required at this time. Patient has completed COVID-vaccine series with monovalent and polyvalent boosters but did not get updated variant specific vaccine. She has also completed shingles vaccine series and pneumococcal vaccine series.  PCV-20 administered as booster vaccine today. Recommend yearly high-dose quadrivalent flu vaccine in October-November Also recommend RSV vaccination.  She will consider.  HTN. BP taken x2 in office today. 140/60.  138/62. BP remains significantly improved now on lisinopril 40 mg/amlodipine 5 mg/furosemide 20 mg. Note the patient has also had interval weight loss. No adverse effects of medications.  Will continue current regimen. Again reviewed lifestyle management including additional weight loss and aerobic exercise as tolerated.   Obesity. 10-15 minute conversation regarding management of obesity. Patient has had an interval 10-14 pound weight loss in the past year but her BMI is still elevated at 32.9 and at least another 25 pound weight loss is recommended. Obesity-related morbidities were reviewed including diabetes (as above,) hypertension (as above) heart disease, knee arthritis, fatty liver, and others. Dietary and lifestyle approaches to achieve weight loss reviewed including structured calorie and fat restricted diet as well as daily aerobic exercise as tolerated. Patient referred to nutritionist and states will make an appointment soon.  Right arm tremor. Differential diagnosis includes Limited Parkinson's disease and tardive dyskinesia. Patient declining neurology evaluation (as recommended) because condition has been stable for so long.  Will continue as needed treatment with propranolol.  T2DM REVIEWED serial hemoglobin A1c results most recently 7.7 in October 2023. Explained the rationale (to reduce vascular and other complications) as well as the goal (hemoglobin A1c under 7.5, ideally under 7.0) for management of this condition. Patient states she has generally been compliant with all medications including metformin 1000 mg twice daily and glimepiride 2 mg once daily.  States that her insurance did not cover Farxiga (previously prescribed.  Declines GLP-1 based on concerns about self-injection. Follow-up hemoglobin A1c sent today.  Further discussion pending results.  Patient understands that I will insist on additional medication if her hemoglobin A1c has increased since last year.

## 2024-08-13 NOTE — PHYSICAL EXAM
[No Acute Distress] : no acute distress [Well Nourished] : well nourished [Well Developed] : well developed [Well-Appearing] : well-appearing [Normal Sclera/Conjunctiva] : normal sclera/conjunctiva [PERRL] : pupils equal round and reactive to light [EOMI] : extraocular movements intact [Normal Outer Ear/Nose] : the outer ears and nose were normal in appearance [Normal Oropharynx] : the oropharynx was normal [No JVD] : no jugular venous distention [No Lymphadenopathy] : no lymphadenopathy [Supple] : supple [Thyroid Normal, No Nodules] : the thyroid was normal and there were no nodules present [No Respiratory Distress] : no respiratory distress  [No Accessory Muscle Use] : no accessory muscle use [Clear to Auscultation] : lungs were clear to auscultation bilaterally [Normal Rate] : normal rate  [Regular Rhythm] : with a regular rhythm [Normal S1, S2] : normal S1 and S2 [No Murmur] : no murmur heard [No Carotid Bruits] : no carotid bruits [No Edema] : there was no peripheral edema [Soft] : abdomen soft [Non Tender] : non-tender [Non-distended] : non-distended [No Masses] : no abdominal mass palpated [No HSM] : no HSM [Normal Bowel Sounds] : normal bowel sounds [Normal Posterior Cervical Nodes] : no posterior cervical lymphadenopathy [Normal Anterior Cervical Nodes] : no anterior cervical lymphadenopathy [No CVA Tenderness] : no CVA  tenderness [No Spinal Tenderness] : no spinal tenderness [No Joint Swelling] : no joint swelling [Grossly Normal Strength/Tone] : grossly normal strength/tone [No Rash] : no rash [Coordination Grossly Intact] : coordination grossly intact [No Focal Deficits] : no focal deficits [Normal Gait] : normal gait [Deep Tendon Reflexes (DTR)] : deep tendon reflexes were 2+ and symmetric [Normal] : normal gait, coordination grossly intact, no focal deficits and deep tendon reflexes were 2+ and symmetric [Normal Affect] : the affect was normal [Normal Insight/Judgement] : insight and judgment were intact [de-identified] : Moderately obese [de-identified] : Slightly hyper affect but fully appropriate

## 2024-08-13 NOTE — HEALTH RISK ASSESSMENT
[Good] : ~his/her~  mood as  good [No] : In the past 12 months have you used drugs other than those required for medical reasons? No [No falls in past year] : Patient reported no falls in the past year [0] : 2) Feeling down, depressed, or hopeless: Not at all (0) [PHQ-2 Negative - No further assessment needed] : PHQ-2 Negative - No further assessment needed [Patient reported mammogram was normal] : Patient reported mammogram was normal [Patient declined PAP Smear] : Patient declined PAP Smear [Patient declined colonoscopy] : Patient declined colonoscopy [HIV test declined] : HIV test declined [Hepatitis C test declined] : Hepatitis C test declined [With Family] : lives with family [Retired] : retired [Single] : single [Fully functional (bathing, dressing, toileting, transferring, walking, feeding)] : Fully functional (bathing, dressing, toileting, transferring, walking, feeding) [Fully functional (using the telephone, shopping, preparing meals, housekeeping, doing laundry, using] : Fully functional and needs no help or supervision to perform IADLs (using the telephone, shopping, preparing meals, housekeeping, doing laundry, using transportation, managing medications and managing finances) [Reports normal functional visual acuity (ie: able to read med bottle)] : Reports normal functional visual acuity [Seat Belt] :  uses seat belt [Sunscreen] : uses sunscreen [Former] : Former [15-19] : 15-19 [> 15 Years] : > 15 Years [NO] : No [Audit-CScore] : 0 [TZO6Kubay] : 0 [Change in mental status noted] : No change in mental status noted [Sexually Active] : not sexually active [Reports changes in hearing] : Reports no changes in hearing [Reports changes in vision] : Reports no changes in vision [Reports changes in dental health] : Reports no changes in dental health [TB Exposure] : is not being exposed to tuberculosis [MammogramDate] : 7/2018 [PapSmearDate] : 2013 [ColonoscopyDate] : 2008 [de-identified] : mother [AdvancecareDate] : 08/2024

## 2024-08-13 NOTE — HISTORY OF PRESENT ILLNESS
[Family Member] : family member [FreeTextEntry1] : 74-year-old female with T2 DM, hypertension, hyperlipidemia, low back pain/sacroiliitis, and chronic bipolar illness returns for yearly CPE. She denies hospitalization or surgery (other than cataract removal) in the past year.  She also denies any new medical diagnosis.  [de-identified] : She wishes to discuss ongoing management of diabetes and hypertension. States she has been compliant with medications but has not been able to adopt lifestyle modifications as previously recommended. Continues to have right arm tremor, possibly worsening  Patient denies acute recurrent anxiety attacks but states that, in general, she had a "bad "winter which she had to spend at home, taking care of her mother.  She feels mildly depressed but within the range of her usual mood fluctuation and she also notes improvement in her mood within the past several months.  She has been getting out of her house in order to attend services and other functions at her [employer]. Her mother is currently in hospice at home with home care attendant several times per week which she says has taken "a great load off of her back ".

## 2024-08-14 LAB
ALBUMIN SERPL ELPH-MCNC: 4.5 G/DL
ALP BLD-CCNC: 77 U/L
ALT SERPL-CCNC: 19 U/L
ANION GAP SERPL CALC-SCNC: 16 MMOL/L
APPEARANCE: CLEAR
AST SERPL-CCNC: 17 U/L
BASOPHILS # BLD AUTO: 0.09 K/UL
BASOPHILS NFR BLD AUTO: 0.8 %
BILIRUB SERPL-MCNC: 0.4 MG/DL
BILIRUBIN URINE: NEGATIVE
BLOOD URINE: NEGATIVE
BUN SERPL-MCNC: 28 MG/DL
CALCIUM SERPL-MCNC: 11 MG/DL
CHLORIDE SERPL-SCNC: 109 MMOL/L
CHOLEST SERPL-MCNC: 148 MG/DL
CO2 SERPL-SCNC: 13 MMOL/L
COLOR: YELLOW
CREAT SERPL-MCNC: 1.09 MG/DL
CREAT SPEC-SCNC: 96 MG/DL
CREAT/PROT UR: 0.6 RATIO
EGFR: 53 ML/MIN/1.73M2
EOSINOPHIL # BLD AUTO: 0.26 K/UL
EOSINOPHIL NFR BLD AUTO: 2.2 %
ESTIMATED AVERAGE GLUCOSE: 143 MG/DL
GLUCOSE QUALITATIVE U: NEGATIVE MG/DL
GLUCOSE SERPL-MCNC: 84 MG/DL
HBA1C MFR BLD HPLC: 6.6 %
HCT VFR BLD CALC: 38 %
HCV AB SER QL: NONREACTIVE
HCV S/CO RATIO: 0.18 S/CO
HDLC SERPL-MCNC: 48 MG/DL
HGB BLD-MCNC: 12 G/DL
IMM GRANULOCYTES NFR BLD AUTO: 0.4 %
KETONES URINE: NEGATIVE MG/DL
LDLC SERPL CALC-MCNC: 72 MG/DL
LEUKOCYTE ESTERASE URINE: NEGATIVE
LYMPHOCYTES # BLD AUTO: 2.03 K/UL
LYMPHOCYTES NFR BLD AUTO: 17.2 %
MAN DIFF?: NORMAL
MCHC RBC-ENTMCNC: 29.1 PG
MCHC RBC-ENTMCNC: 31.6 GM/DL
MCV RBC AUTO: 92.2 FL
MONOCYTES # BLD AUTO: 0.77 K/UL
MONOCYTES NFR BLD AUTO: 6.5 %
NEUTROPHILS # BLD AUTO: 8.59 K/UL
NEUTROPHILS NFR BLD AUTO: 72.9 %
NITRITE URINE: NEGATIVE
NONHDLC SERPL-MCNC: 100 MG/DL
PH URINE: 5.5
PLATELET # BLD AUTO: 225 K/UL
POTASSIUM SERPL-SCNC: 4.7 MMOL/L
PROT SERPL-MCNC: 7 G/DL
PROT UR-MCNC: 55 MG/DL
PROTEIN URINE: 100 MG/DL
RBC # BLD: 4.12 M/UL
RBC # FLD: 14.6 %
SODIUM SERPL-SCNC: 138 MMOL/L
SPECIFIC GRAVITY URINE: 1.01
TRIGL SERPL-MCNC: 159 MG/DL
TSH SERPL-ACNC: 2.47 UIU/ML
UROBILINOGEN URINE: 0.2 MG/DL
WBC # FLD AUTO: 11.79 K/UL

## 2024-09-26 ENCOUNTER — RX RENEWAL (OUTPATIENT)
Age: 74
End: 2024-09-26

## 2024-10-23 ENCOUNTER — NON-APPOINTMENT (OUTPATIENT)
Age: 74
End: 2024-10-23

## 2024-10-23 NOTE — PROGRESS NOTE ADULT - PROBLEM/PLAN-4
Airway       Patient location during procedure: OR       Procedure Start/Stop Times: 10/23/2024 11:35 AM  Staff -        CRNA: Leyla Kidd APRN CRNA       Performed By: CRNAIndications and Patient Condition       Indications for airway management: marian-procedural       Induction type:intravenous       Mask difficulty assessment: 2 - vent by mask + OA or adjuvant +/- NMBA    Final Airway Details       Final airway type: endotracheal airway       Successful airway: ETT - single  Endotracheal Airway Details        ETT size (mm): 7.0       Cuffed: yes       Successful intubation technique: direct laryngoscopy       DL Blade Type: Gerard 2       Grade View of Cords: 1       Adjucts: stylet       Position: Right       Measured from: gums/teeth       Secured at (cm): 22       Bite block used: None    Post intubation assessment        Placement verified by: capnometry, equal breath sounds and chest rise        Number of attempts at approach: 1       Number of other approaches attempted: 0       Secured with: tape       Ease of procedure: easy       Dentition: Intact and Unchanged    Medication(s) Administered   Medication Administration Time: 10/23/2024 11:35 AM        
DISPLAY PLAN FREE TEXT

## 2024-11-05 ENCOUNTER — RX RENEWAL (OUTPATIENT)
Age: 74
End: 2024-11-05

## 2024-11-05 ENCOUNTER — APPOINTMENT (OUTPATIENT)
Dept: INTERNAL MEDICINE | Facility: CLINIC | Age: 74
End: 2024-11-05

## 2025-01-09 ENCOUNTER — APPOINTMENT (OUTPATIENT)
Dept: INTERNAL MEDICINE | Facility: CLINIC | Age: 75
End: 2025-01-09

## 2025-02-06 ENCOUNTER — APPOINTMENT (OUTPATIENT)
Dept: INTERNAL MEDICINE | Facility: CLINIC | Age: 75
End: 2025-02-06

## 2025-03-18 ENCOUNTER — RX RENEWAL (OUTPATIENT)
Age: 75
End: 2025-03-18

## 2025-03-24 ENCOUNTER — EMERGENCY (EMERGENCY)
Facility: HOSPITAL | Age: 75
LOS: 1 days | Discharge: HOME | End: 2025-03-24
Attending: EMERGENCY MEDICINE | Admitting: EMERGENCY MEDICINE
Payer: MEDICARE

## 2025-03-24 VITALS
OXYGEN SATURATION: 95 % | RESPIRATION RATE: 18 BRPM | SYSTOLIC BLOOD PRESSURE: 102 MMHG | WEIGHT: 179.9 LBS | DIASTOLIC BLOOD PRESSURE: 52 MMHG | TEMPERATURE: 98 F | HEART RATE: 64 BPM

## 2025-03-24 VITALS
TEMPERATURE: 98 F | DIASTOLIC BLOOD PRESSURE: 64 MMHG | RESPIRATION RATE: 20 BRPM | OXYGEN SATURATION: 96 % | SYSTOLIC BLOOD PRESSURE: 143 MMHG | HEART RATE: 61 BPM

## 2025-03-24 LAB
ALBUMIN SERPL ELPH-MCNC: 3.3 G/DL — LOW (ref 3.4–5)
ALP SERPL-CCNC: 92 U/L — SIGNIFICANT CHANGE UP (ref 40–120)
ALT FLD-CCNC: 18 U/L — SIGNIFICANT CHANGE UP (ref 12–42)
ANION GAP SERPL CALC-SCNC: 10 MMOL/L — SIGNIFICANT CHANGE UP (ref 9–16)
APPEARANCE UR: CLEAR — SIGNIFICANT CHANGE UP
AST SERPL-CCNC: 10 U/L — LOW (ref 15–37)
BASOPHILS # BLD AUTO: 0.09 K/UL — SIGNIFICANT CHANGE UP (ref 0–0.2)
BASOPHILS NFR BLD AUTO: 0.6 % — SIGNIFICANT CHANGE UP (ref 0–2)
BILIRUB SERPL-MCNC: 0.7 MG/DL — SIGNIFICANT CHANGE UP (ref 0.2–1.2)
BILIRUB UR-MCNC: NEGATIVE — SIGNIFICANT CHANGE UP
BUN SERPL-MCNC: 23 MG/DL — SIGNIFICANT CHANGE UP (ref 7–23)
CALCIUM SERPL-MCNC: 10.7 MG/DL — HIGH (ref 8.5–10.5)
CHLORIDE SERPL-SCNC: 102 MMOL/L — SIGNIFICANT CHANGE UP (ref 96–108)
CO2 SERPL-SCNC: 23 MMOL/L — SIGNIFICANT CHANGE UP (ref 22–31)
COLOR SPEC: YELLOW — SIGNIFICANT CHANGE UP
CREAT SERPL-MCNC: 1.12 MG/DL — SIGNIFICANT CHANGE UP (ref 0.5–1.3)
DIFF PNL FLD: NEGATIVE — SIGNIFICANT CHANGE UP
EGFR: 51 ML/MIN/1.73M2 — LOW
EGFR: 51 ML/MIN/1.73M2 — LOW
EOSINOPHIL # BLD AUTO: 0.19 K/UL — SIGNIFICANT CHANGE UP (ref 0–0.5)
EOSINOPHIL NFR BLD AUTO: 1.3 % — SIGNIFICANT CHANGE UP (ref 0–6)
GLUCOSE SERPL-MCNC: 81 MG/DL — SIGNIFICANT CHANGE UP (ref 70–99)
GLUCOSE UR QL: NEGATIVE MG/DL — SIGNIFICANT CHANGE UP
HCT VFR BLD CALC: 33.8 % — LOW (ref 34.5–45)
HGB BLD-MCNC: 11.1 G/DL — LOW (ref 11.5–15.5)
IMM GRANULOCYTES # BLD AUTO: 0.07 K/UL — SIGNIFICANT CHANGE UP (ref 0–0.07)
IMM GRANULOCYTES NFR BLD AUTO: 0.5 % — SIGNIFICANT CHANGE UP (ref 0–0.9)
KETONES UR-MCNC: NEGATIVE MG/DL — SIGNIFICANT CHANGE UP
LEUKOCYTE ESTERASE UR-ACNC: ABNORMAL
LYMPHOCYTES # BLD AUTO: 1.87 K/UL — SIGNIFICANT CHANGE UP (ref 1–3.3)
LYMPHOCYTES NFR BLD AUTO: 13.1 % — SIGNIFICANT CHANGE UP (ref 13–44)
MCHC RBC-ENTMCNC: 28.7 PG — SIGNIFICANT CHANGE UP (ref 27–34)
MCHC RBC-ENTMCNC: 32.8 G/DL — SIGNIFICANT CHANGE UP (ref 32–36)
MCV RBC AUTO: 87.3 FL — SIGNIFICANT CHANGE UP (ref 80–100)
MONOCYTES # BLD AUTO: 1.32 K/UL — HIGH (ref 0–0.9)
MONOCYTES NFR BLD AUTO: 9.2 % — SIGNIFICANT CHANGE UP (ref 2–14)
NEUTROPHILS # BLD AUTO: 10.76 K/UL — HIGH (ref 1.8–7.4)
NEUTROPHILS NFR BLD AUTO: 75.3 % — SIGNIFICANT CHANGE UP (ref 43–77)
NITRITE UR-MCNC: NEGATIVE — SIGNIFICANT CHANGE UP
NRBC # BLD AUTO: 0 K/UL — SIGNIFICANT CHANGE UP (ref 0–0)
NRBC # FLD: 0 K/UL — SIGNIFICANT CHANGE UP (ref 0–0)
NRBC BLD AUTO-RTO: 0 /100 WBCS — SIGNIFICANT CHANGE UP (ref 0–0)
PH UR: 5 — SIGNIFICANT CHANGE UP (ref 5–8)
PLATELET # BLD AUTO: 227 K/UL — SIGNIFICANT CHANGE UP (ref 150–400)
PMV BLD: 10.2 FL — SIGNIFICANT CHANGE UP (ref 7–13)
POTASSIUM SERPL-MCNC: 4.4 MMOL/L — SIGNIFICANT CHANGE UP (ref 3.5–5.3)
POTASSIUM SERPL-SCNC: 4.4 MMOL/L — SIGNIFICANT CHANGE UP (ref 3.5–5.3)
PROT SERPL-MCNC: 7.5 G/DL — SIGNIFICANT CHANGE UP (ref 6.4–8.2)
PROT UR-MCNC: 100 MG/DL
RBC # BLD: 3.87 M/UL — SIGNIFICANT CHANGE UP (ref 3.8–5.2)
RBC # FLD: 13.2 % — SIGNIFICANT CHANGE UP (ref 10.3–14.5)
SODIUM SERPL-SCNC: 135 MMOL/L — SIGNIFICANT CHANGE UP (ref 132–145)
SP GR SPEC: 1.01 — SIGNIFICANT CHANGE UP (ref 1–1.03)
UROBILINOGEN FLD QL: 0.2 MG/DL — SIGNIFICANT CHANGE UP (ref 0.2–1)
WBC # BLD: 14.3 K/UL — HIGH (ref 3.8–10.5)
WBC # FLD AUTO: 14.3 K/UL — HIGH (ref 3.8–10.5)

## 2025-03-24 PROCEDURE — 74177 CT ABD & PELVIS W/CONTRAST: CPT | Mod: 26

## 2025-03-24 PROCEDURE — 99285 EMERGENCY DEPT VISIT HI MDM: CPT

## 2025-03-24 RX ORDER — CEFPODOXIME PROXETIL 200 MG/1
1 TABLET, FILM COATED ORAL
Qty: 14 | Refills: 0
Start: 2025-03-24 | End: 2025-03-30

## 2025-03-24 RX ORDER — CEFPODOXIME PROXETIL 200 MG/1
200 TABLET, FILM COATED ORAL ONCE
Refills: 0 | Status: COMPLETED | OUTPATIENT
Start: 2025-03-24 | End: 2025-03-24

## 2025-03-24 RX ORDER — METRONIDAZOLE 250 MG
500 TABLET ORAL ONCE
Refills: 0 | Status: COMPLETED | OUTPATIENT
Start: 2025-03-24 | End: 2025-03-24

## 2025-03-24 RX ORDER — METRONIDAZOLE 250 MG
1 TABLET ORAL
Qty: 21 | Refills: 0
Start: 2025-03-24 | End: 2025-03-30

## 2025-03-24 RX ADMIN — CEFPODOXIME PROXETIL 200 MILLIGRAM(S): 200 TABLET, FILM COATED ORAL at 15:52

## 2025-03-24 RX ADMIN — Medication 500 MILLIGRAM(S): at 15:52

## 2025-03-24 RX ADMIN — Medication 1000 MILLILITER(S): at 13:04

## 2025-03-24 NOTE — ED PROVIDER NOTE - OBJECTIVE STATEMENT
Patient reports 4 days of left lower quadrant pain with constipation.  Having small volume, soft stool.  No fever, chest pain, shortness of breath, nausea, vomiting, diarrhea, dysuria, urgency, frequency.  Patient has not been taking her usual fiber supplement for the past 2 weeks

## 2025-03-24 NOTE — ED ADULT TRIAGE NOTE - AVIAN FLU SYMPTOMS
VACCINE ADMINISTRATION RECORD  PARENT / GUARDIAN APPROVAL  Date: 2025  Vaccine administered to: Vanessa Gonzalez     : 2026    MRN: [id number]    A copy of the appropriate Centers for Disease Control and Prevention Vaccine Information statement has been provided. I have read or have had explained the information about the diseases and the vaccines listed below. There was an opportunity to ask questions and any questions were answered satisfactorily. I believe that I understand the benefits and risks of the vaccine cited and ask that the vaccine(s) listed below be given to me or to the person named above (for whom I am authorized to make this request).    VACCINES ADMINISTERED:  HIB   and Varivax      I have read and hereby agree to be bound by the terms of this agreement as stated above. My signature is valid until revoked by me in writing.  This document is signed by parent, relationship: Parents on 2025.:                                                                                                     2025                                    Parent / Guardian Signature                                                Date    Faviola Lopez RN served as a witness to authentication that the identity of the person signing electronically is in fact the person represented as signing.    This document was generated by Faviola Lopez RN on 2025.  
No

## 2025-03-24 NOTE — ED PROVIDER NOTE - PROGRESS NOTE DETAILS
Patient resting comfortably, feels moderately improved. Will Rx antibiotics. Return to the ED immediately if getting worse, not improving, or if having any new or troubling symptoms.

## 2025-03-24 NOTE — ED ADULT TRIAGE NOTE - CHIEF COMPLAINT QUOTE
Pt walked in c/o intermittent L sided abd pain x 1 week. Denies n/v/d or pain at this time. Hx HTN and DM.

## 2025-03-24 NOTE — ED PROVIDER NOTE - NSICDXPASTMEDICALHX_GEN_ALL_CORE_FT
Type: In-clinic ICD check   Presenting: AF-/VS, 56bpm  Lead/Battery Status: Stable lead and battery measurements.  Atrial Arrhythmias: Persistent AF since 8/7/2019. VR>/=120~10%  Vent Arrhythmias: None  Anticoagulant: Warfarin  Comments: Normal device function. Alert for AT/AF programmed off. YY      Patient seems headed for chronic atrial fibrillation with rate control strategy  Currently on sotalol  Schedule  patient to see me within a month to discuss atrial fib management   PAST MEDICAL HISTORY:  Arthritis of left hip     Asthma     Bipolar disorder     Diabetes mellitus     HLD (hyperlipidemia)     HTN (hypertension)

## 2025-03-24 NOTE — ED ADULT NURSE NOTE - OBJECTIVE STATEMENT
Pt walked in c/o intermittent L sided abd pain x 1 week. Denies n/v/d or pain at this time. Hx HTN and DM.  Hx HTN, DM, HLD, TD  20grarm initiated and labs sent   Fluids initiated on patient as ordered. Plan of care ongoing

## 2025-03-24 NOTE — ED PROVIDER NOTE - PHYSICAL EXAMINATION
Gen: NAD. HEENT: NCAT, mmm   Chest: RRR, nl S1 and S2, no m/r/g. Resp: CTAB, no w/r/r  Abd: nl BS, soft, Nondistended, moderate left lower quadrant tenderness, no G/R. Ext: Warm, dry  Neuro: CN II-XII intact, normal and equal strength, sensation, and reflexes bilaterally, normal gait, speech clear  Psych: AAOx3 no chest pain,, and no shortness of breath.

## 2025-03-24 NOTE — ED PROVIDER NOTE - PATIENT PORTAL LINK FT
You can access the FollowMyHealth Patient Portal offered by [facility] by registering at the following website: [email or web address]. By joining [employer]’s FollowMyHealth portal, you will also be able to view your health information using other applications (apps) compatible with our system.

## 2025-03-27 DIAGNOSIS — K57.32 DIVERTICULITIS OF LARGE INTESTINE WITHOUT PERFORATION OR ABSCESS WITHOUT BLEEDING: ICD-10-CM

## 2025-03-27 DIAGNOSIS — Z88.5 ALLERGY STATUS TO NARCOTIC AGENT: ICD-10-CM

## 2025-03-27 DIAGNOSIS — R10.32 LEFT LOWER QUADRANT PAIN: ICD-10-CM

## 2025-04-15 ENCOUNTER — LABORATORY RESULT (OUTPATIENT)
Age: 75
End: 2025-04-15

## 2025-04-15 ENCOUNTER — APPOINTMENT (OUTPATIENT)
Dept: INTERNAL MEDICINE | Facility: CLINIC | Age: 75
End: 2025-04-15

## 2025-04-15 VITALS
WEIGHT: 182 LBS | BODY MASS INDEX: 32.25 KG/M2 | HEIGHT: 63 IN | TEMPERATURE: 98 F | HEART RATE: 62 BPM | SYSTOLIC BLOOD PRESSURE: 145 MMHG | OXYGEN SATURATION: 96 % | DIASTOLIC BLOOD PRESSURE: 67 MMHG

## 2025-04-15 DIAGNOSIS — E83.52 HYPERCALCEMIA: ICD-10-CM

## 2025-04-15 DIAGNOSIS — Z86.16 PERSONAL HISTORY OF COVID-19: ICD-10-CM

## 2025-04-15 DIAGNOSIS — I10 ESSENTIAL (PRIMARY) HYPERTENSION: ICD-10-CM

## 2025-04-15 DIAGNOSIS — J45.909 UNSPECIFIED ASTHMA, UNCOMPLICATED: ICD-10-CM

## 2025-04-15 DIAGNOSIS — F31.9 BIPOLAR DISORDER, UNSPECIFIED: ICD-10-CM

## 2025-04-15 DIAGNOSIS — E78.5 HYPERLIPIDEMIA, UNSPECIFIED: ICD-10-CM

## 2025-04-15 DIAGNOSIS — E66.9 OBESITY, UNSPECIFIED: ICD-10-CM

## 2025-04-15 DIAGNOSIS — E55.9 VITAMIN D DEFICIENCY, UNSPECIFIED: ICD-10-CM

## 2025-04-15 DIAGNOSIS — E11.9 TYPE 2 DIABETES MELLITUS W/OUT COMPLICATIONS: ICD-10-CM

## 2025-04-15 PROCEDURE — G2211 COMPLEX E/M VISIT ADD ON: CPT

## 2025-04-15 PROCEDURE — 36415 COLL VENOUS BLD VENIPUNCTURE: CPT

## 2025-04-15 PROCEDURE — 99215 OFFICE O/P EST HI 40 MIN: CPT

## 2025-04-16 LAB
25(OH)D3 SERPL-MCNC: 50.9 NG/ML
ALBUMIN SERPL ELPH-MCNC: 4.3 G/DL
ALP BLD-CCNC: 88 U/L
ALT SERPL-CCNC: 12 U/L
ANION GAP SERPL CALC-SCNC: 16 MMOL/L
APPEARANCE: CLEAR
AST SERPL-CCNC: 13 U/L
BILIRUB SERPL-MCNC: 0.4 MG/DL
BILIRUBIN URINE: NEGATIVE
BLOOD URINE: NEGATIVE
BUN SERPL-MCNC: 31 MG/DL
CALCIUM SERPL-MCNC: 10.3 MG/DL
CALCIUM SERPL-MCNC: 10.5 MG/DL
CHLORIDE SERPL-SCNC: 104 MMOL/L
CO2 SERPL-SCNC: 16 MMOL/L
COLOR: NORMAL
CREAT SERPL-MCNC: 1 MG/DL
EGFRCR SERPLBLD CKD-EPI 2021: 58 ML/MIN/1.73M2
ESTIMATED AVERAGE GLUCOSE: 146 MG/DL
GLUCOSE QUALITATIVE U: NEGATIVE MG/DL
GLUCOSE SERPL-MCNC: 104 MG/DL
HBA1C MFR BLD HPLC: 6.7 %
KETONES URINE: NEGATIVE MG/DL
LEUKOCYTE ESTERASE URINE: ABNORMAL
NITRITE URINE: NEGATIVE
PARATHYROID HORMONE INTACT: 79 PG/ML
PH URINE: 5.5
POTASSIUM SERPL-SCNC: 4.8 MMOL/L
PROT SERPL-MCNC: 6.6 G/DL
PROTEIN URINE: 30 MG/DL
SODIUM SERPL-SCNC: 137 MMOL/L
SPECIFIC GRAVITY URINE: 1.01
TSH SERPL-ACNC: 2.65 UIU/ML
UROBILINOGEN URINE: 0.2 MG/DL

## 2025-04-17 LAB
BASOPHILS # BLD AUTO: 0.07 K/UL
BASOPHILS NFR BLD AUTO: 0.6 %
EOSINOPHIL # BLD AUTO: 0.35 K/UL
EOSINOPHIL NFR BLD AUTO: 2.9 %
HCT VFR BLD CALC: 37.1 %
HGB BLD-MCNC: 11.1 G/DL
IMM GRANULOCYTES NFR BLD AUTO: 0.3 %
LYMPHOCYTES # BLD AUTO: 1.99 K/UL
LYMPHOCYTES NFR BLD AUTO: 16.5 %
MAN DIFF?: NORMAL
MCHC RBC-ENTMCNC: 28.5 PG
MCHC RBC-ENTMCNC: 29.9 G/DL
MCV RBC AUTO: 95.4 FL
MONOCYTES # BLD AUTO: 0.78 K/UL
MONOCYTES NFR BLD AUTO: 6.5 %
NEUTROPHILS # BLD AUTO: 8.86 K/UL
NEUTROPHILS NFR BLD AUTO: 73.2 %
PLATELET # BLD AUTO: 238 K/UL
RBC # BLD: 3.89 M/UL
RBC # FLD: 14.6 %
WBC # FLD AUTO: 12.09 K/UL

## 2025-04-18 LAB — BACTERIA UR CULT: ABNORMAL

## 2025-04-25 DIAGNOSIS — N28.89 OTHER SPECIFIED DISORDERS OF KIDNEY AND URETER: ICD-10-CM

## 2025-05-09 ENCOUNTER — OUTPATIENT (OUTPATIENT)
Dept: OUTPATIENT SERVICES | Facility: HOSPITAL | Age: 75
LOS: 1 days | End: 2025-05-09

## 2025-05-09 ENCOUNTER — APPOINTMENT (OUTPATIENT)
Dept: MRI IMAGING | Facility: CLINIC | Age: 75
End: 2025-05-09

## 2025-05-09 PROCEDURE — 74183 MRI ABD W/O CNTR FLWD CNTR: CPT | Mod: 26

## 2025-05-09 PROCEDURE — 72197 MRI PELVIS W/O & W/DYE: CPT | Mod: 26

## 2025-05-15 ENCOUNTER — EMERGENCY (EMERGENCY)
Facility: HOSPITAL | Age: 75
LOS: 1 days | End: 2025-05-15
Attending: EMERGENCY MEDICINE | Admitting: EMERGENCY MEDICINE
Payer: MEDICARE

## 2025-05-15 VITALS
WEIGHT: 190.04 LBS | SYSTOLIC BLOOD PRESSURE: 146 MMHG | TEMPERATURE: 99 F | RESPIRATION RATE: 20 BRPM | HEART RATE: 66 BPM | DIASTOLIC BLOOD PRESSURE: 62 MMHG | OXYGEN SATURATION: 94 %

## 2025-05-15 VITALS — HEART RATE: 58 BPM | OXYGEN SATURATION: 98 % | TEMPERATURE: 99 F | RESPIRATION RATE: 20 BRPM

## 2025-05-15 DIAGNOSIS — G24.01 DRUG INDUCED SUBACUTE DYSKINESIA: ICD-10-CM

## 2025-05-15 DIAGNOSIS — R60.0 LOCALIZED EDEMA: ICD-10-CM

## 2025-05-15 DIAGNOSIS — Z87.891 PERSONAL HISTORY OF NICOTINE DEPENDENCE: ICD-10-CM

## 2025-05-15 DIAGNOSIS — I10 ESSENTIAL (PRIMARY) HYPERTENSION: ICD-10-CM

## 2025-05-15 DIAGNOSIS — J45.909 UNSPECIFIED ASTHMA, UNCOMPLICATED: ICD-10-CM

## 2025-05-15 DIAGNOSIS — R09.81 NASAL CONGESTION: ICD-10-CM

## 2025-05-15 DIAGNOSIS — R06.02 SHORTNESS OF BREATH: ICD-10-CM

## 2025-05-15 DIAGNOSIS — E78.5 HYPERLIPIDEMIA, UNSPECIFIED: ICD-10-CM

## 2025-05-15 DIAGNOSIS — Z88.5 ALLERGY STATUS TO NARCOTIC AGENT: ICD-10-CM

## 2025-05-15 LAB
ALBUMIN SERPL ELPH-MCNC: 3.3 G/DL — LOW (ref 3.4–5)
ALP SERPL-CCNC: 81 U/L — SIGNIFICANT CHANGE UP (ref 40–120)
ALT FLD-CCNC: 16 U/L — SIGNIFICANT CHANGE UP (ref 12–42)
ANION GAP SERPL CALC-SCNC: 9 MMOL/L — SIGNIFICANT CHANGE UP (ref 9–16)
AST SERPL-CCNC: 18 U/L — SIGNIFICANT CHANGE UP (ref 15–37)
BASOPHILS # BLD AUTO: 0.06 K/UL — SIGNIFICANT CHANGE UP (ref 0–0.2)
BASOPHILS NFR BLD AUTO: 0.5 % — SIGNIFICANT CHANGE UP (ref 0–2)
BILIRUB SERPL-MCNC: 0.5 MG/DL — SIGNIFICANT CHANGE UP (ref 0.2–1.2)
BUN SERPL-MCNC: 16 MG/DL — SIGNIFICANT CHANGE UP (ref 7–23)
CALCIUM SERPL-MCNC: 10.3 MG/DL — SIGNIFICANT CHANGE UP (ref 8.5–10.5)
CHLORIDE SERPL-SCNC: 105 MMOL/L — SIGNIFICANT CHANGE UP (ref 96–108)
CK MB BLD-MCNC: 0.88 % — SIGNIFICANT CHANGE UP
CK MB CFR SERPL CALC: 0.6 NG/ML — SIGNIFICANT CHANGE UP (ref 0.5–3.6)
CK SERPL-CCNC: 68 U/L — SIGNIFICANT CHANGE UP (ref 26–192)
CO2 SERPL-SCNC: 22 MMOL/L — SIGNIFICANT CHANGE UP (ref 22–31)
CREAT SERPL-MCNC: 0.84 MG/DL — SIGNIFICANT CHANGE UP (ref 0.5–1.3)
EGFR: 72 ML/MIN/1.73M2 — SIGNIFICANT CHANGE UP
EGFR: 72 ML/MIN/1.73M2 — SIGNIFICANT CHANGE UP
EOSINOPHIL # BLD AUTO: 0.32 K/UL — SIGNIFICANT CHANGE UP (ref 0–0.5)
EOSINOPHIL NFR BLD AUTO: 2.9 % — SIGNIFICANT CHANGE UP (ref 0–6)
FLUAV AG NPH QL: SIGNIFICANT CHANGE UP
FLUBV AG NPH QL: SIGNIFICANT CHANGE UP
GLUCOSE SERPL-MCNC: 129 MG/DL — HIGH (ref 70–99)
HCT VFR BLD CALC: 31.5 % — LOW (ref 34.5–45)
HGB BLD-MCNC: 10.1 G/DL — LOW (ref 11.5–15.5)
IMM GRANULOCYTES # BLD AUTO: 0.04 K/UL — SIGNIFICANT CHANGE UP (ref 0–0.07)
IMM GRANULOCYTES NFR BLD AUTO: 0.4 % — SIGNIFICANT CHANGE UP (ref 0–0.9)
LIDOCAIN IGE QN: 56 U/L — SIGNIFICANT CHANGE UP (ref 16–77)
LYMPHOCYTES # BLD AUTO: 1.57 K/UL — SIGNIFICANT CHANGE UP (ref 1–3.3)
LYMPHOCYTES NFR BLD AUTO: 14.3 % — SIGNIFICANT CHANGE UP (ref 13–44)
MCHC RBC-ENTMCNC: 28.7 PG — SIGNIFICANT CHANGE UP (ref 27–34)
MCHC RBC-ENTMCNC: 32.1 G/DL — SIGNIFICANT CHANGE UP (ref 32–36)
MCV RBC AUTO: 89.5 FL — SIGNIFICANT CHANGE UP (ref 80–100)
MONOCYTES # BLD AUTO: 0.78 K/UL — SIGNIFICANT CHANGE UP (ref 0–0.9)
MONOCYTES NFR BLD AUTO: 7.1 % — SIGNIFICANT CHANGE UP (ref 2–14)
NEUTROPHILS # BLD AUTO: 8.21 K/UL — HIGH (ref 1.8–7.4)
NEUTROPHILS NFR BLD AUTO: 74.8 % — SIGNIFICANT CHANGE UP (ref 43–77)
NRBC # BLD AUTO: 0 K/UL — SIGNIFICANT CHANGE UP (ref 0–0)
NRBC # FLD: 0 K/UL — SIGNIFICANT CHANGE UP (ref 0–0)
NRBC BLD AUTO-RTO: 0 /100 WBCS — SIGNIFICANT CHANGE UP (ref 0–0)
NT-PROBNP SERPL-SCNC: 605 PG/ML — HIGH
PLATELET # BLD AUTO: 194 K/UL — SIGNIFICANT CHANGE UP (ref 150–400)
PMV BLD: 11 FL — SIGNIFICANT CHANGE UP (ref 7–13)
POTASSIUM SERPL-MCNC: 4.6 MMOL/L — SIGNIFICANT CHANGE UP (ref 3.5–5.3)
POTASSIUM SERPL-SCNC: 4.6 MMOL/L — SIGNIFICANT CHANGE UP (ref 3.5–5.3)
PROT SERPL-MCNC: 6.8 G/DL — SIGNIFICANT CHANGE UP (ref 6.4–8.2)
RBC # BLD: 3.52 M/UL — LOW (ref 3.8–5.2)
RBC # FLD: 14.7 % — HIGH (ref 10.3–14.5)
RSV RNA NPH QL NAA+NON-PROBE: SIGNIFICANT CHANGE UP
SARS-COV-2 RNA SPEC QL NAA+PROBE: SIGNIFICANT CHANGE UP
SODIUM SERPL-SCNC: 136 MMOL/L — SIGNIFICANT CHANGE UP (ref 132–145)
SOURCE RESPIRATORY: SIGNIFICANT CHANGE UP
TROPONIN I, HIGH SENSITIVITY RESULT: 9.4 NG/L — SIGNIFICANT CHANGE UP
WBC # BLD: 10.98 K/UL — HIGH (ref 3.8–10.5)
WBC # FLD AUTO: 10.98 K/UL — HIGH (ref 3.8–10.5)

## 2025-05-15 PROCEDURE — 99285 EMERGENCY DEPT VISIT HI MDM: CPT

## 2025-05-15 PROCEDURE — 71046 X-RAY EXAM CHEST 2 VIEWS: CPT | Mod: 26

## 2025-05-15 RX ORDER — ALBUTEROL SULFATE 2.5 MG/3ML
2.5 VIAL, NEBULIZER (ML) INHALATION ONCE
Refills: 0 | Status: COMPLETED | OUTPATIENT
Start: 2025-05-15 | End: 2025-05-15

## 2025-05-15 RX ADMIN — Medication 2.5 MILLIGRAM(S): at 15:05

## 2025-05-15 NOTE — ED ADULT TRIAGE NOTE - CHIEF COMPLAINT QUOTE
Pt with increasing SOB, pt has hx of asthma. Pt utilized inhaler this morning upon waking up around 0500 and again around 1130. Pt denies CP.

## 2025-05-15 NOTE — ED PROVIDER NOTE - CLINICAL SUMMARY MEDICAL DECISION MAKING FREE TEXT BOX
74-year-old female with a history of bipolar disorder currently on Abilify, tardive dyskinesia from previous psychiatric medications, asthma previous smoker quit 36 years ago, hyperlipidemia, hypertension that presents with 2 weeks of shortness of breath worsening in the last 5 days.  At this time concern for patient having a URI versus pneumonia versus CHF exacerbation given that she is 5 days ago she had some chest pain and now with leg swelling and 2+ pitting edema leading to shortness of breath.  URI 2 weeks ago that was not getting better and now may be superimposed pneumonia although she says that she has whitish phlegm but denies any fevers or other signs or symptoms of infection.  States that she still has a nasal congestion.  EKG shows normal sinus rhythm but low voltage.  No GREGG/STD.  Will workup for cardiac issues including CHF exacerbation versus ACS versus URI versus pneumonia.  Patient is saturating 94% on room air so we will provide supplemental oxygen for patient comfort.  Will closely monitor possibly admission for abnormal findings

## 2025-05-15 NOTE — ED PROVIDER NOTE - OBJECTIVE STATEMENT
74-year-old female with a history of bipolar disorder currently on Abilify, tardive dyskinesia from previous psychiatric medications, asthma previous smoker quit 36 years ago, hyperlipidemia, hypertension that presents with 2 weeks of shortness of breath worsening in the last 5 days.  Patient states that 2 weeks ago she had signs and symptoms of a URI and the symptoms have slightly improved but continues.  She has had difficulty breathing secondary to her symptoms with a cough at times brings up some phlegm which is white in color no blood.  She called her PMD yesterday and told them that she had difficulty breathing so this morning they called her to come to the ED for evaluation.  Patient denies any fevers, chills, nausea, vomiting, diarrhea, urinary symptoms but states that she has noticed that her legs are more swollen than usual although she does have chronic edema currently only on lisinopril.  Denies any foreign travel or sick contacts.  Patient does report that approximately 5 days ago did have some chest discomfort but unable to elaborate or describe.  Patient has not had any trauma or falls.  No drinking no drugs.  Has been compliant with her medications

## 2025-05-15 NOTE — ED PROVIDER NOTE - PROGRESS NOTE DETAILS
PARTH: Patient was reassessed and she states that her symptoms have improved she is breathing back to her baseline her labs are unremarkable troponin is normal CK is normal proBNP is unremarkable I do not think she is having a CHF exacerbation flu COVID-negative chest x-ray read by me shows no focal consolidations pneumothorax or effusions.  I tried to call her doctor Dr. Marlow but no answer.  I explained the patient that she should use a spacer which was provided to her to help with her inhaler use to see if this improves her breathing as needed.  Return precautions explained understood at this time patient is amenable to discharge home to follow with Dr. Marlow.

## 2025-05-15 NOTE — ED ADULT NURSE NOTE - OBJECTIVE STATEMENT
Pt presents to ED c/o SOB. States she's been feeling SOB for months but got a cold two weeks and has steadily increased her use of inhaler to 4 times a day. Complained to her PMD office and was advised to come to ED. Pt placed on 02 via NC @2L/min and in no distress at this time.

## 2025-05-15 NOTE — ED PROVIDER NOTE - PATIENT PORTAL LINK FT
You can access the FollowMyHealth Patient Portal offered by [facility] by registering at the following website: [email or web address]. By joining [employer]’s FollowMyHealth portal, you will also be able to view your health information using other applications (apps) compatible with our system.

## 2025-05-15 NOTE — ED ADULT NURSE NOTE - NSHOSCREENINGQ1_ED_ALL_ED
EXAMINATION TYPE: XR chest 1V portable

 

DATE OF EXAM: 11/30/2021

 

COMPARISON: 11/27/2021

 

INDICATION: Dyspnea

 

TECHNIQUE: Single frontal view of the chest is obtained.

 

FINDINGS:  

The heart size is borderline in size.  

The pulmonary vasculature is prominent.  

Diffuse increased lung markings are present. A right lower lobe streaky opacities present. Correlate 
for some atelectasis.  

 

 

IMPRESSION:  

1. Medical consideration for congestive heart failure and pulmonary edema is recommended. Other etiol
ogies including atypical pneumonia should be considered. Follow-up is recommended No

## 2025-05-15 NOTE — ED PROVIDER NOTE - PHYSICAL EXAMINATION
Patient is awake and alert speaking full sentences.  Bilateral upper extremities right greater than left with tremors.  Lungs with crackles at bases but no wheezing rales or rhonchi.  Heart is regular rate and rhythm no murmurs rubs or gallops.  Abdomen is soft and nontender.  There is 2+ pitting edema bilateral lower extremities.  Distal pulses are palpable and strong.  Skin is warm diffusely not hot to touch not diaphoretic

## 2025-05-15 NOTE — ED PROVIDER NOTE - NSFOLLOWUPINSTRUCTIONS_ED_ALL_ED_FT
You were seen in the emergency department today for difficulty breathing    We obtained blood test, x-ray and an EKG and did not think that your difficulty breathing was secondary to your heart    We obtained a flu/COVID/RSV swab to rule out any viral infections and did not find any evidence of this    Your x-ray looks normal    We provided you with a spacer for your albuterol inhaler we recommend that you use this every time you use the inhaler which will make it easier to inhale the medication to help your breathing    Upon discharge from the emergency room we recommend you follow-up with Dr. Marlow your pulmonologist for further recommendations on what next if anything needs to be done but also to make sure you are getting better not worse    At any time if your symptoms worsen you cannot breathe or you have difficulty breathing with ambulation or you have chest pain please come back to the emergency room as soon as possible for further evaluation and treatment.

## 2025-05-20 ENCOUNTER — NON-APPOINTMENT (OUTPATIENT)
Age: 75
End: 2025-05-20

## 2025-05-30 ENCOUNTER — APPOINTMENT (OUTPATIENT)
Dept: INTERNAL MEDICINE | Facility: CLINIC | Age: 75
End: 2025-05-30
Payer: MEDICARE

## 2025-05-30 VITALS
HEIGHT: 63 IN | TEMPERATURE: 98.4 F | BODY MASS INDEX: 33.66 KG/M2 | OXYGEN SATURATION: 93 % | WEIGHT: 190 LBS | DIASTOLIC BLOOD PRESSURE: 68 MMHG | SYSTOLIC BLOOD PRESSURE: 123 MMHG | HEART RATE: 71 BPM

## 2025-05-30 DIAGNOSIS — I50.9 HEART FAILURE, UNSPECIFIED: ICD-10-CM

## 2025-05-30 DIAGNOSIS — J45.909 UNSPECIFIED ASTHMA, UNCOMPLICATED: ICD-10-CM

## 2025-05-30 DIAGNOSIS — E11.9 TYPE 2 DIABETES MELLITUS W/OUT COMPLICATIONS: ICD-10-CM

## 2025-05-30 DIAGNOSIS — R60.0 LOCALIZED EDEMA: ICD-10-CM

## 2025-05-30 DIAGNOSIS — I10 ESSENTIAL (PRIMARY) HYPERTENSION: ICD-10-CM

## 2025-05-30 DIAGNOSIS — R06.09 OTHER FORMS OF DYSPNEA: ICD-10-CM

## 2025-05-30 PROCEDURE — G2211 COMPLEX E/M VISIT ADD ON: CPT

## 2025-05-30 PROCEDURE — 99213 OFFICE O/P EST LOW 20 MIN: CPT

## 2025-06-16 RX ORDER — FLUTICASONE PROPIONATE 50 UG/1
50 SPRAY NASAL TWICE DAILY
Qty: 1 | Refills: 5 | Status: ACTIVE | COMMUNITY
Start: 2025-06-16 | End: 1900-01-01

## 2025-06-17 RX ORDER — AMOXICILLIN AND CLAVULANATE POTASSIUM 500; 125 MG/1; MG/1
500-125 TABLET, FILM COATED ORAL 3 TIMES DAILY
Qty: 15 | Refills: 1 | Status: ACTIVE | COMMUNITY
Start: 2025-06-17 | End: 1900-01-01

## 2025-06-19 ENCOUNTER — EMERGENCY (EMERGENCY)
Age: 75
LOS: 1 days | End: 2025-06-19
Attending: EMERGENCY MEDICINE | Admitting: EMERGENCY MEDICINE
Payer: MEDICARE

## 2025-06-19 VITALS
DIASTOLIC BLOOD PRESSURE: 59 MMHG | OXYGEN SATURATION: 94 % | RESPIRATION RATE: 18 BRPM | HEART RATE: 57 BPM | WEIGHT: 175.05 LBS | HEIGHT: 67 IN | TEMPERATURE: 98 F | SYSTOLIC BLOOD PRESSURE: 134 MMHG

## 2025-06-19 DIAGNOSIS — F31.9 BIPOLAR DISORDER, UNSPECIFIED: ICD-10-CM

## 2025-06-19 DIAGNOSIS — M19.90 UNSPECIFIED OSTEOARTHRITIS, UNSPECIFIED SITE: ICD-10-CM

## 2025-06-19 DIAGNOSIS — M17.12 UNILATERAL PRIMARY OSTEOARTHRITIS, LEFT KNEE: ICD-10-CM

## 2025-06-19 DIAGNOSIS — J45.909 UNSPECIFIED ASTHMA, UNCOMPLICATED: ICD-10-CM

## 2025-06-19 DIAGNOSIS — Z88.5 ALLERGY STATUS TO NARCOTIC AGENT: ICD-10-CM

## 2025-06-19 DIAGNOSIS — M25.562 PAIN IN LEFT KNEE: ICD-10-CM
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
## 2025-06-19 DIAGNOSIS — I10 ESSENTIAL (PRIMARY) HYPERTENSION: ICD-10-CM
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
## 2025-06-19 DIAGNOSIS — E78.5 HYPERLIPIDEMIA, UNSPECIFIED: ICD-10-CM

## 2025-06-19 PROCEDURE — 99284 EMERGENCY DEPT VISIT MOD MDM: CPT | Mod: FS

## 2025-06-19 PROCEDURE — 73564 X-RAY EXAM KNEE 4 OR MORE: CPT | Mod: 26,LT

## 2025-06-19 RX ORDER — ACETAMINOPHEN 500 MG/5ML
975 LIQUID (ML) ORAL ONCE
Refills: 0 | Status: COMPLETED | OUTPATIENT
Start: 2025-06-19 | End: 2025-06-19

## 2025-06-27 ENCOUNTER — APPOINTMENT (OUTPATIENT)
Dept: ORTHOPEDIC SURGERY | Facility: CLINIC | Age: 75
End: 2025-06-27

## 2025-06-27 VITALS — WEIGHT: 180 LBS | HEIGHT: 63 IN | BODY MASS INDEX: 31.89 KG/M2

## 2025-06-27 PROBLEM — Z86.79 HISTORY OF HYPERTENSION: Status: RESOLVED | Noted: 2025-06-27 | Resolved: 2025-06-27

## 2025-06-27 PROCEDURE — 99204 OFFICE O/P NEW MOD 45 MIN: CPT | Mod: 25

## 2025-06-27 PROCEDURE — 20610 DRAIN/INJ JOINT/BURSA W/O US: CPT | Mod: LT

## 2025-07-01 NOTE — ED PROVIDER NOTE - DR. NAME
[facility]  Inpatient Progress Note    Serenity Ramon Patient Status:  Inpatient    1967 MRN [id number]   Location [facility] UN[address] 30 Attending Julio Cesar Murillo MD   Hosp Day # 1 PCP German Valverde MD     Telemetry: Personally reviewed, NSR    Assessment & Plan:  57-year-old with female with PMH significant for HFpEF, COPD, obesity, bipolar disorder admitted with shortness of breath admitted with chest pain.    Atypical Chest Pain:  - History of abnormal stress test with nonobstructive disease  - Symptoms atypical and unchanged from previous.  Troponins negative.  - Repeat stress with similar defect.  - Patient uninterested in cath.  Continue medical management.  Urged her to follow-up with her outpatient cardiologist    Discussed with RN at bedside    Richard Tyson M.D., F.A.C.C.  Interventional Cardiology  [facility]    --------------------------------------------------------------------------------------------------------------------------------  10 point ROS performed. All negative, except as documented above    History:  Past Medical History:   Diagnosis Date    AF (atrial fibrillation)  (CMD)     Anemia     Anxiety     Arthritis     Bipolar 1 disorder  (CMD)     Chronic pain     Generalized    COPD (chronic obstructive pulmonary disease)  (CMD)     Diabetes  (CMD)     Essential hypertension     Gastroesophageal reflux disease     Heart failure (CMD)     High cholesterol     Obesity     Restless leg syndrome     Schizophrenia (CMD)     Sleep apnea     Not on the machine    Vertigo 2020    per pt     Vitamin D deficiency      Past Surgical History:   Procedure Laterality Date    Abdomen surgery      Eye surgery Bilateral     Cataracts    Hysterectomy      Other surgical history      cath 10/09 normal cor art with lv dysfxn      Family History   Problem Relation Age of Onset    Cancer Mother     Diabetes Mother     Heart disease Mother      Aneurysm Neg Hx         Negative for AAA      reports that she has been smoking cigarettes. She started smoking about 44 years ago. She has a 40.4 pack-year smoking history. She has been exposed to tobacco smoke. She has never used smokeless tobacco. She reports that she does not currently use alcohol. She reports that she does not use drugs.    Intake/Output:     Intake/Output Summary (Last 24 hours) at 7/1/2025 1544  Last data filed at 7/1/2025 1220  Gross per 24 hour   Intake 1640 ml   Output --   Net 1640 ml       Medications:  Current Facility-Administered Medications   Medication    dextrose 50 % injection 25 g    dextrose 50 % injection 12.5 g    glucagon (GLUCAGEN) injection 1 mg    dextrose (GLUTOSE) 40 % gel 15 g    dextrose (GLUTOSE) 40 % gel 30 g    insulin lispro (ADMELOG,HumaLOG) - Correction Dose    meclizine (ANTIVERT) tablet 25 mg    lurasidone (LATUDA) tablet 20 mg    ipratropium-albuterol (DUONEB) 0.5-2.5 (3) MG/3ML nebulizer solution 3 mL    lamoTRIgine (LaMICtal) tablet 300 mg    lamoTRIgine (LaMICtal) tablet 200 mg    ALPRAZolam (XANAX) tablet 1 mg    sodium chloride 0.9 % injection 10 mL    sodium chloride 0.9 % injection 2 mL    ipratropium-albuterol (DUONEB) 0.5-2.5 (3) MG/3ML nebulizer solution 3 mL    aspirin chewable 81 mg    empagliflozin (JARDIANCE) tablet 10 mg    sacubitril-valsartan (ENTRESTO) 49-51 MG per tablet 1 tablet    insulin glargine (LANTUS) injection 30 Units    linaclotide (LINZESS) capsule 145 mcg    mirtazapine (REMERON) tablet 15 mg    potassium CHLORIDE (KLOR-CON M) silvia ER tablet 10 mEq    rivaroxaban (XARELTO) tablet 20 mg    rOPINIRole (REQUIP) tablet 0.5 mg    sertraline (ZOLOFT) tablet 200 mg    spironolactone (ALDACTONE) tablet 25 mg    torsemide (DEMADEX) tablet 40 mg    HYDROcodone-acetaminophen (NORCO)  MG per tablet 1 tablet    famotidine (PEPCID) tablet 20 mg     Current Outpatient Medications   Medication Sig    mirtazapine (REMERON) 15 MG tablet Take  1 Tablet(s) Oral every night at bedtime    NovoLIN N FlexPen 100 UNIT/ML pen-injector INJECT 20 UNITS UNDER THE SKIN THREE TIMES DAILY WITH MEALS    fluticasone (FLONASE) 50 MCG/ACT nasal spray SPRAY 1 SPRAY INTO EACH NOSTRIL TWO TIMES DAILY    famotidine (PEPCID) 40 MG tablet Take 40 mg by mouth daily.    lurasidone (LATUDA) 20 MG tablet Take 1 tablet by mouth daily (with breakfast).    lamoTRIgine (LaMICtal) 100 MG tablet Take 3 tablets in the morning and 2 tablets in the evening    sertraline (ZOLOFT) 100 MG tablet Take 2 tablets by mouth daily.    simethicone 125 MG capsule Take 1 capsule by mouth daily.    spironolactone (ALDACTONE) 25 MG tablet Take 25 mg by mouth daily.    rOPINIRole (REQUIP) 0.5 MG tablet Take 1 tablet by mouth at bedtime as needed (restless legs).    Entresto 49-51 MG per tablet Take 1 tablet by mouth in the morning and 1 tablet in the evening.    potassium chloride (KLOR-CON M) 10 MEQ silvia ER tablet Take 10 mEq by mouth at bedtime.    naLOXone (NARCAN) 4 MG/0.1ML nasal liquid For suspected opioid overdose, administer a single spray intranasally into 1 nostril, then seek emergency medical care. May repeat every 2-3 minutes if minimal or no response    meclizine (ANTIVERT) 25 MG tablet Take 1 tablet by mouth 3 times daily as needed for Dizziness.    Lancets (OneTouch Delica Plus [id number]) Misc TEST BLOOD SUGAR THREE TIMES DAILY BEFORE MEALS    BD Pen Needle Micro U/F 32G X 6 MM Misc USE AS DIRECTED FOUR TIMES A DAY    OneTouch Ultra test strip TEST BLOOD GLUCOSE 3 TIMES DAILY BEFORE MEALS    Breo Ellipta 100-25 MCG/INH inhaler Inhale 1 puff into the lungs daily.    torsemide (DEMADEX) 20 MG tablet Take 40 mg by mouth 2 times daily.    linaclotide (LINZESS) 145 MCG capsule Take 145 mcg by mouth daily.    oxygen (O2) gas Inhale 6-7 L/min into the lungs continuous.    rivaroxaban (XARELTO) 20 MG Tab Take 1 tablet by mouth daily (with dinner).    aspirin (Aspirin Childrens) 81 MG chewable  tablet Chew 1 tablet by mouth daily.    dapagliflozin (Farxiga) 10 MG tablet Take 10 mg by mouth daily.     insulin glargine (Basaglar KwikPen) 100 UNIT/ML pen-injector Inject 42 Units into the skin nightly. Prime 2 units before each dose.    ALPRAZolam (XANAX) 2 MG tablet Take 1 tablet by mouth nightly as needed for Sleep or Anxiety.    glimepiride (AMARYL) 2 MG tablet Take 2 mg by mouth 2 times daily (before meals).    HYDROcodone-acetaminophen (NORCO)  MG per tablet Take 1 tablet by mouth every 6 hours as needed for Pain.    ipratropium-albuterol (DUONEB) 0.5-2.5 (3) MG/3ML nebulizer solution Take 3 mLs by nebulization every 6 hours as needed for Wheezing or Shortness of Breath.         Physical Exam:  Visit Vitals  /56 (BP Location: LUE - Left upper extremity, Patient Position: Sitting)   Pulse 65   Temp 97.7 °F (36.5 °C) (Oral)   Resp 18   Ht 5' 2\" (1.575 m)   Wt 118 kg (260 lb 2.3 oz)   SpO2 100%   BMI 47.58 kg/m²       General: Alert and Oriented  HEENT: Normocephalic, anicteric sclera, neck supple.    Neck: No JVD, carotids 2+, no bruits.  Cardiac: Regular rate and rhythm. S1, S2 normal. No murmur, pericardial rub, S3.  Lungs: Clear without wheezes, rales, rhonchi or dullness.  Normal excursions and effort.  Abdomen: Soft, non-tender. BS-present.  Extremities: Without clubbing, cyanosis or edema.  Peripheral pulses are 2+.  Neurologic: Non-focal  Skin: Warm and dry.      Sudha Forrester)

## 2025-07-21 ENCOUNTER — APPOINTMENT (OUTPATIENT)
Dept: HEART AND VASCULAR | Facility: CLINIC | Age: 75
End: 2025-07-21
Payer: MEDICARE

## 2025-07-21 VITALS
HEIGHT: 63 IN | WEIGHT: 190 LBS | OXYGEN SATURATION: 93 % | BODY MASS INDEX: 33.66 KG/M2 | TEMPERATURE: 98.3 F | HEART RATE: 60 BPM | DIASTOLIC BLOOD PRESSURE: 64 MMHG | SYSTOLIC BLOOD PRESSURE: 133 MMHG

## 2025-07-21 DIAGNOSIS — N39.0 SEPSIS, UNSPECIFIED ORGANISM: ICD-10-CM

## 2025-07-21 DIAGNOSIS — R60.0 LOCALIZED EDEMA: ICD-10-CM

## 2025-07-21 DIAGNOSIS — I10 ESSENTIAL (PRIMARY) HYPERTENSION: ICD-10-CM

## 2025-07-21 DIAGNOSIS — Z12.39 ENCOUNTER FOR OTHER SCREENING FOR MALIGNANT NEOPLASM OF BREAST: ICD-10-CM

## 2025-07-21 DIAGNOSIS — Z87.898 PERSONAL HISTORY OF OTHER SPECIFIED CONDITIONS: ICD-10-CM

## 2025-07-21 DIAGNOSIS — M25.551 PAIN IN RIGHT HIP: ICD-10-CM

## 2025-07-21 DIAGNOSIS — M25.562 PAIN IN LEFT KNEE: ICD-10-CM

## 2025-07-21 DIAGNOSIS — Z23 ENCOUNTER FOR IMMUNIZATION: ICD-10-CM

## 2025-07-21 DIAGNOSIS — M25.552 PAIN IN RIGHT HIP: ICD-10-CM

## 2025-07-21 DIAGNOSIS — Z71.85 ENCOUNTER FOR IMMUNIZATION SAFETY COUNSELING: ICD-10-CM

## 2025-07-21 DIAGNOSIS — Z13.820 ENCOUNTER FOR SCREENING FOR OSTEOPOROSIS: ICD-10-CM

## 2025-07-21 DIAGNOSIS — Z87.39 PERSONAL HISTORY OF OTHER DISEASES OF THE MUSCULOSKELETAL SYSTEM AND CONNECTIVE TISSUE: ICD-10-CM

## 2025-07-21 DIAGNOSIS — R50.9 FEVER, UNSPECIFIED: ICD-10-CM

## 2025-07-21 DIAGNOSIS — E11.9 TYPE 2 DIABETES MELLITUS W/OUT COMPLICATIONS: ICD-10-CM

## 2025-07-21 DIAGNOSIS — Z87.09 PERSONAL HISTORY OF OTHER DISEASES OF THE RESPIRATORY SYSTEM: ICD-10-CM

## 2025-07-21 DIAGNOSIS — R06.09 OTHER FORMS OF DYSPNEA: ICD-10-CM

## 2025-07-21 DIAGNOSIS — I50.9 HEART FAILURE, UNSPECIFIED: ICD-10-CM

## 2025-07-21 DIAGNOSIS — Z88.9 ALLERGY STATUS TO UNSPECIFIED DRUGS, MEDICAMENTS AND BIOLOGICAL SUBSTANCES: ICD-10-CM

## 2025-07-21 DIAGNOSIS — Z12.11 ENCOUNTER FOR SCREENING FOR MALIGNANT NEOPLASM OF COLON: ICD-10-CM

## 2025-07-21 DIAGNOSIS — Z11.59 ENCOUNTER FOR SCREENING FOR OTHER VIRAL DISEASES: ICD-10-CM

## 2025-07-21 DIAGNOSIS — Z13.31 ENCOUNTER FOR SCREENING FOR DEPRESSION: ICD-10-CM

## 2025-07-21 DIAGNOSIS — R82.81 PYURIA: ICD-10-CM

## 2025-07-21 DIAGNOSIS — E78.5 HYPERLIPIDEMIA, UNSPECIFIED: ICD-10-CM

## 2025-07-21 DIAGNOSIS — A41.9 SEPSIS, UNSPECIFIED ORGANISM: ICD-10-CM

## 2025-07-21 DIAGNOSIS — E66.9 OBESITY, UNSPECIFIED: ICD-10-CM

## 2025-07-21 DIAGNOSIS — E55.9 VITAMIN D DEFICIENCY, UNSPECIFIED: ICD-10-CM

## 2025-07-21 DIAGNOSIS — R07.89 OTHER CHEST PAIN: ICD-10-CM

## 2025-07-21 DIAGNOSIS — Z01.818 ENCOUNTER FOR OTHER PREPROCEDURAL EXAMINATION: ICD-10-CM

## 2025-07-21 DIAGNOSIS — Z79.899 OTHER LONG TERM (CURRENT) DRUG THERAPY: ICD-10-CM

## 2025-07-21 PROCEDURE — 93000 ELECTROCARDIOGRAM COMPLETE: CPT

## 2025-07-21 PROCEDURE — 99204 OFFICE O/P NEW MOD 45 MIN: CPT

## 2025-07-21 PROCEDURE — G2211 COMPLEX E/M VISIT ADD ON: CPT

## 2025-08-27 ENCOUNTER — APPOINTMENT (OUTPATIENT)
Dept: INTERNAL MEDICINE | Facility: CLINIC | Age: 75
End: 2025-08-27
Payer: MEDICARE

## 2025-08-27 ENCOUNTER — LABORATORY RESULT (OUTPATIENT)
Age: 75
End: 2025-08-27

## 2025-08-27 VITALS
HEIGHT: 63 IN | BODY MASS INDEX: 32.43 KG/M2 | HEART RATE: 64 BPM | OXYGEN SATURATION: 93 % | TEMPERATURE: 98.1 F | WEIGHT: 183 LBS | SYSTOLIC BLOOD PRESSURE: 147 MMHG | DIASTOLIC BLOOD PRESSURE: 74 MMHG

## 2025-08-27 DIAGNOSIS — F31.9 BIPOLAR DISORDER, UNSPECIFIED: ICD-10-CM

## 2025-08-27 DIAGNOSIS — J45.909 UNSPECIFIED ASTHMA, UNCOMPLICATED: ICD-10-CM

## 2025-08-27 DIAGNOSIS — E83.52 HYPERCALCEMIA: ICD-10-CM

## 2025-08-27 DIAGNOSIS — E66.9 OBESITY, UNSPECIFIED: ICD-10-CM

## 2025-08-27 DIAGNOSIS — R25.1 TREMOR, UNSPECIFIED: ICD-10-CM

## 2025-08-27 DIAGNOSIS — Z00.00 ENCOUNTER FOR GENERAL ADULT MEDICAL EXAMINATION W/OUT ABNORMAL FINDINGS: ICD-10-CM

## 2025-08-27 DIAGNOSIS — D64.9 ANEMIA, UNSPECIFIED: ICD-10-CM

## 2025-08-27 DIAGNOSIS — E11.9 TYPE 2 DIABETES MELLITUS W/OUT COMPLICATIONS: ICD-10-CM

## 2025-08-27 DIAGNOSIS — E78.5 HYPERLIPIDEMIA, UNSPECIFIED: ICD-10-CM

## 2025-08-27 DIAGNOSIS — I10 ESSENTIAL (PRIMARY) HYPERTENSION: ICD-10-CM

## 2025-08-27 PROCEDURE — 36415 COLL VENOUS BLD VENIPUNCTURE: CPT

## 2025-08-27 PROCEDURE — 99212 OFFICE O/P EST SF 10 MIN: CPT | Mod: 25

## 2025-08-27 PROCEDURE — G2211 COMPLEX E/M VISIT ADD ON: CPT

## 2025-08-27 PROCEDURE — G0447 BEHAVIOR COUNSEL OBESITY 15M: CPT | Mod: 59

## 2025-08-27 PROCEDURE — G0439: CPT

## 2025-08-28 LAB
25(OH)D3 SERPL-MCNC: 77.1 NG/ML
ALBUMIN SERPL ELPH-MCNC: 4.3 G/DL
ALP BLD-CCNC: 88 U/L
ALT SERPL-CCNC: 17 U/L
ANION GAP SERPL CALC-SCNC: 14 MMOL/L
APPEARANCE: CLEAR
AST SERPL-CCNC: 17 U/L
BASOPHILS # BLD AUTO: 0.07 K/UL
BASOPHILS NFR BLD AUTO: 0.6 %
BILIRUB SERPL-MCNC: 0.4 MG/DL
BILIRUBIN URINE: NEGATIVE
BLOOD URINE: NEGATIVE
BUN SERPL-MCNC: 23 MG/DL
CALCIUM SERPL-MCNC: 10.6 MG/DL
CHLORIDE SERPL-SCNC: 103 MMOL/L
CHOLEST SERPL-MCNC: 124 MG/DL
CO2 SERPL-SCNC: 22 MMOL/L
COLOR: YELLOW
CREAT SERPL-MCNC: 0.93 MG/DL
EGFRCR SERPLBLD CKD-EPI 2021: 63 ML/MIN/1.73M2
EOSINOPHIL # BLD AUTO: 0.33 K/UL
EOSINOPHIL NFR BLD AUTO: 2.9 %
ESTIMATED AVERAGE GLUCOSE: 146 MG/DL
GLUCOSE QUALITATIVE U: NEGATIVE MG/DL
GLUCOSE SERPL-MCNC: 124 MG/DL
HBA1C MFR BLD HPLC: 6.7 %
HCT VFR BLD CALC: 37.5 %
HDLC SERPL-MCNC: 55 MG/DL
HGB BLD-MCNC: 11.8 G/DL
IMM GRANULOCYTES NFR BLD AUTO: 0.3 %
IRON SERPL-MCNC: 45 UG/DL
KETONES URINE: NEGATIVE MG/DL
LDLC SERPL-MCNC: 48 MG/DL
LEUKOCYTE ESTERASE URINE: NEGATIVE
LYMPHOCYTES # BLD AUTO: 2.04 K/UL
LYMPHOCYTES NFR BLD AUTO: 17.8 %
MAN DIFF?: NORMAL
MCHC RBC-ENTMCNC: 28.3 PG
MCHC RBC-ENTMCNC: 31.5 G/DL
MCV RBC AUTO: 89.9 FL
MONOCYTES # BLD AUTO: 0.92 K/UL
MONOCYTES NFR BLD AUTO: 8 %
NEUTROPHILS # BLD AUTO: 8.09 K/UL
NEUTROPHILS NFR BLD AUTO: 70.4 %
NITRITE URINE: NEGATIVE
NONHDLC SERPL-MCNC: 69 MG/DL
PH URINE: 5.5
PLATELET # BLD AUTO: 210 K/UL
POTASSIUM SERPL-SCNC: 4.4 MMOL/L
PROT SERPL-MCNC: 6.9 G/DL
PROTEIN URINE: 100 MG/DL
RBC # BLD: 4.17 M/UL
RBC # FLD: 14.6 %
SODIUM SERPL-SCNC: 139 MMOL/L
SPECIFIC GRAVITY URINE: 1.02
TRIGL SERPL-MCNC: 117 MG/DL
TSH SERPL-ACNC: 2.89 UIU/ML
UROBILINOGEN URINE: 0.2 MG/DL
WBC # FLD AUTO: 11.48 K/UL

## 2025-09-09 ENCOUNTER — APPOINTMENT (OUTPATIENT)
Dept: ORTHOPEDIC SURGERY | Facility: CLINIC | Age: 75
End: 2025-09-09

## 2025-09-11 DIAGNOSIS — Z23 ENCOUNTER FOR IMMUNIZATION: ICD-10-CM

## 2025-09-15 ENCOUNTER — RX RENEWAL (OUTPATIENT)
Age: 75
End: 2025-09-15

## 2025-09-18 ENCOUNTER — APPOINTMENT (OUTPATIENT)
Dept: HEART AND VASCULAR | Facility: CLINIC | Age: 75
End: 2025-09-18